# Patient Record
Sex: FEMALE | Race: WHITE | Employment: OTHER | URBAN - METROPOLITAN AREA
[De-identification: names, ages, dates, MRNs, and addresses within clinical notes are randomized per-mention and may not be internally consistent; named-entity substitution may affect disease eponyms.]

---

## 2017-08-10 ENCOUNTER — HOSPITAL ENCOUNTER (OUTPATIENT)
Dept: GENERAL RADIOLOGY | Age: 62
Discharge: HOME | End: 2017-08-10
Attending: INTERNAL MEDICINE | Admitting: INTERNAL MEDICINE

## 2017-08-10 DIAGNOSIS — M81.0 AGE-RELATED OSTEOPOROSIS WITHOUT CURRENT PATHOLOGICAL FRACTURE: ICD-10-CM

## 2017-08-10 DIAGNOSIS — M81.0 OSTEOPOROSIS WITHOUT CURRENT PATHOLOGICAL FRACTURE, UNSPECIFIED OSTEOPOROSIS TYPE: ICD-10-CM

## 2017-08-10 DIAGNOSIS — M81.0 OSTEOPOROSIS, UNSPECIFIED OSTEOPOROSIS TYPE, UNSPECIFIED PATHOLOGICAL FRACTURE PRESENCE: ICD-10-CM

## 2017-09-20 LAB — TSH SERPL DL<=0.05 MIU/L-ACNC: 2.81 UIU/ML (ref 0.27–4.2)

## 2017-10-09 ENCOUNTER — OFFICE VISIT (OUTPATIENT)
Dept: ENT CLINIC | Age: 62
End: 2017-10-09

## 2017-10-09 VITALS
SYSTOLIC BLOOD PRESSURE: 148 MMHG | HEART RATE: 65 BPM | HEIGHT: 63 IN | WEIGHT: 136.8 LBS | BODY MASS INDEX: 24.24 KG/M2 | DIASTOLIC BLOOD PRESSURE: 87 MMHG

## 2017-10-09 DIAGNOSIS — R07.0 THROAT DISCOMFORT: ICD-10-CM

## 2017-10-09 DIAGNOSIS — H93.12 SUBJECTIVE TINNITUS OF LEFT EAR: Primary | ICD-10-CM

## 2017-10-09 PROCEDURE — 3017F COLORECTAL CA SCREEN DOC REV: CPT | Performed by: OTOLARYNGOLOGY

## 2017-10-09 PROCEDURE — 99203 OFFICE O/P NEW LOW 30 MIN: CPT | Performed by: OTOLARYNGOLOGY

## 2017-10-09 PROCEDURE — G8484 FLU IMMUNIZE NO ADMIN: HCPCS | Performed by: OTOLARYNGOLOGY

## 2017-10-09 PROCEDURE — 1036F TOBACCO NON-USER: CPT | Performed by: OTOLARYNGOLOGY

## 2017-10-09 PROCEDURE — G8427 DOCREV CUR MEDS BY ELIG CLIN: HCPCS | Performed by: OTOLARYNGOLOGY

## 2017-10-09 PROCEDURE — G8420 CALC BMI NORM PARAMETERS: HCPCS | Performed by: OTOLARYNGOLOGY

## 2017-10-09 PROCEDURE — 3014F SCREEN MAMMO DOC REV: CPT | Performed by: OTOLARYNGOLOGY

## 2017-10-09 RX ORDER — MULTIVITAMIN
1 TABLET ORAL
COMMUNITY

## 2017-10-09 RX ORDER — AMLODIPINE BESYLATE 5 MG/1
5 TABLET ORAL
Status: ON HOLD | COMMUNITY
End: 2020-11-19 | Stop reason: HOSPADM

## 2017-10-09 RX ORDER — LISINOPRIL AND HYDROCHLOROTHIAZIDE 12.5; 1 MG/1; MG/1
1 TABLET ORAL
Status: ON HOLD | COMMUNITY
End: 2020-11-19 | Stop reason: HOSPADM

## 2017-10-09 NOTE — PROGRESS NOTES
[facility] ENT       NEW PATIENT VISIT    PCP:  Jose Sim MD      CHIEF COMPLAINT:  Chief Complaint   Patient presents with    Other     ringing in the left ear, and a weird sensation like someone is tappiung on the back of my throat. Alexandria Norton HISTORY OF PRESENT ILLNESS:       (Location, Quality, Severity, Duration, Timing, Context, Modifying factors, Associated symptoms)  Kiley Everett is a 60 y.o. female here today for a problem located in the left ear, of ringing tinnitus. This had been going on for about one year. It is of \"mild\" severity. It is constant, sometimes less at night but  uses a nasal CPAP for sleep apnea. Throat for one month off and on, with a weird sensation like someone is tapping on the back of my throat. No h/o neck of throat injury. Sinus infection 1-2 episodes per year fall and spring. REVIEW OF SYSTEMS:    CONSTITUTIONAL:  Denied fever and chills. Denied unexplained weight loss. EARS, NOSE, THROAT:  (+) hearing loss, tested at [address] [place] [address] & [facility] and another ENT about 2006. She has trouble with background noise. No recommendation for HA.  (+) nasal dyspnea, for many years had septoplasty, and sinus surgery. Denied otorrhea, otalgia, hearing loss, epistaxis, rhinorrhea, sore throat and chronic hoarseness. PAST MEDICAL HISTORY:    History reviewed. No pertinent past medical history. History reviewed. No pertinent surgical history. EXAMINATION:    VITALS SIGNS reviewed. Vitals:    10/09/17 1604 10/09/17 1627   BP: (!) 151/89 (!) 148/87   Site: Left Arm    Position: Sitting    Cuff Size: Medium Adult    Pulse: 71 65   Weight: 136 lb 12.8 oz (62.1 kg)    Height: 5' 3\" (1.6 m)      GENERAL. APPEARANCE:  Well developed, well nourished, no apparent distress, no apparent deformities. ABILITY TO COMMUNICATE/QUALITY OF VOICE:  Communicated without difficulty. Normal voice.   INSPECTION, HEAD AND FACE:  Normal overall appearance, with no scars, lesions or masses. SINUSES:  The maxillary and frontal sinuses were nontender, bilaterally. SALIVARY GLANDS:  Parotid, submandibular and sublingual glands were normal.  FACIAL STRENGTH, MOTION:  Normal and equal for all five branches bilaterally. EYES:  Extraocular motion:  intact, normal primary gaze alignment. HEARING ASSESSMENT:  Finger rub:  Able to hear finger rub bilaterally. Tuning fork tests, [address] tuning fork:  Infante midline. Rinne air > bone bilaterally. (+) EARS:  OTOSCOPY: The right TM and EAC appeared to be normal.  There was cerumen accumulation in the left EAC.  (+) OTOMICROSCOPY:  Cerumen accumulation was removed from the left ear,using a wire loop. The left TM and EAC appeared to be normal.  The left TM mobility was normal to pneumatic massage. EXTERNAL EAR/NOSE:  Normal pinnae and mastoids. Normal external nose. (+) NOSE:  The nasal septum was moderately deviated to the right with a right inferior spur. The inferior turbinates were normal.  The nasal mucosa and secretions were normal.  No pus or polyps were seen. Nasal airflow appeared to be normal bilaterally. LIPS, TEETH AND GUMS:  Normal.  (+) OROPHARYNX/ORALCAVITY:  Post tonsillectomy. Oral mucosa, hard and soft palates, tongue, and pharynx were normal.  (+) INDIRECT LARYNGOSCOPY:  Visualization was suboptimal due to a strong gag reflex and guarding. The base of tongue and epiglottis appeared to be normal.  Unable to visualize the vocal cords and hypopharynx, and endolarynx. NECK:  No masses or tenderness. No abnormal appearance, asymmetry or tracheal deviation. Laryngeal cartilages and hyoid bone were normal to palpation. THYROID:  No nodules, enlargement, tenderness or masses. LYMPH NODES, CERVICAL, FACIAL AND SUPRACLAVICULAR:  No lymphadenopathy. Tiffani Auguste / Aaron Magaña / Adrian Quarto:       [facility] was seen today for other.     Diagnoses and all orders for this visit:    Subjective tinnitus of left ear    Throat discomfort  Comments:  tapping sensation         RECOMMENDATIONS/PLAN:    1. Patient advised to call if throat problem persists longer than 6 weeks or worsens. Flexible  fiberoptic nasopharyngolaryngoscopy if this persists. 2. Audiogram.    3. See Patient Instructions.     4. Return for recheck and follow-up after hearing test.

## 2017-10-10 ENCOUNTER — OFFICE VISIT (OUTPATIENT)
Dept: ENT CLINIC | Age: 62
End: 2017-10-10

## 2017-10-10 DIAGNOSIS — H93.12 LEFT-SIDED TINNITUS: Primary | ICD-10-CM

## 2017-10-10 PROCEDURE — 92557 COMPREHENSIVE HEARING TEST: CPT | Performed by: AUDIOLOGIST

## 2017-10-10 PROCEDURE — 92550 TYMPANOMETRY & REFLEX THRESH: CPT | Performed by: AUDIOLOGIST

## 2018-06-06 ENCOUNTER — OFFICE VISIT (OUTPATIENT)
Dept: ENT CLINIC | Age: 63
End: 2018-06-06

## 2018-06-06 VITALS
HEIGHT: 63 IN | SYSTOLIC BLOOD PRESSURE: 132 MMHG | WEIGHT: 135 LBS | BODY MASS INDEX: 23.92 KG/M2 | HEART RATE: 64 BPM | DIASTOLIC BLOOD PRESSURE: 82 MMHG

## 2018-06-06 DIAGNOSIS — H90.3 BILATERAL HIGH FREQUENCY SENSORINEURAL HEARING LOSS: ICD-10-CM

## 2018-06-06 DIAGNOSIS — H93.12 SUBJECTIVE TINNITUS OF LEFT EAR: Primary | ICD-10-CM

## 2018-06-06 PROCEDURE — 1036F TOBACCO NON-USER: CPT | Performed by: OTOLARYNGOLOGY

## 2018-06-06 PROCEDURE — 99214 OFFICE O/P EST MOD 30 MIN: CPT | Performed by: OTOLARYNGOLOGY

## 2018-06-06 PROCEDURE — 3017F COLORECTAL CA SCREEN DOC REV: CPT | Performed by: OTOLARYNGOLOGY

## 2018-06-06 PROCEDURE — G8427 DOCREV CUR MEDS BY ELIG CLIN: HCPCS | Performed by: OTOLARYNGOLOGY

## 2018-06-06 PROCEDURE — G8420 CALC BMI NORM PARAMETERS: HCPCS | Performed by: OTOLARYNGOLOGY

## 2018-06-11 ENCOUNTER — TELEPHONE (OUTPATIENT)
Dept: ENT CLINIC | Age: 63
End: 2018-06-11

## 2018-06-11 DIAGNOSIS — H93.12 TINNITUS OF LEFT EAR: Primary | ICD-10-CM

## 2018-06-15 ENCOUNTER — HOSPITAL ENCOUNTER (OUTPATIENT)
Dept: MRI IMAGING | Age: 63
Discharge: HOME | End: 2018-06-15
Attending: OTOLARYNGOLOGY | Admitting: OTOLARYNGOLOGY

## 2018-06-15 DIAGNOSIS — H93.12 TINNITUS OF LEFT EAR: ICD-10-CM

## 2018-06-15 DIAGNOSIS — H93.12 SUBJECTIVE TINNITUS OF LEFT EAR: ICD-10-CM

## 2018-06-15 LAB
BUN BLDV-MCNC: 14 MG/DL (ref 7–20)
CREAT SERPL-MCNC: 0.7 MG/DL (ref 0.6–1.2)
GFR AFRICAN AMERICAN: >60
GFR NON-AFRICAN AMERICAN: >60

## 2018-06-15 RX ORDER — 0.9 % SODIUM CHLORIDE 0.9 %
10 VIAL (ML) INJECTION
Status: COMPLETED | OUTPATIENT
Start: 2018-06-15 | End: 2018-06-15

## 2018-06-15 RX ADMIN — Medication 10 ML: at 07:28

## 2018-06-16 ENCOUNTER — TELEPHONE (OUTPATIENT)
Dept: ENT CLINIC | Age: 63
End: 2018-06-16

## 2019-08-12 ENCOUNTER — HOSPITAL ENCOUNTER (OUTPATIENT)
Dept: GENERAL RADIOLOGY | Age: 64
Discharge: HOME | End: 2019-08-12
Payer: MEDICARE

## 2019-08-12 DIAGNOSIS — Z78.0 ASYMPTOMATIC AGE-RELATED POSTMENOPAUSAL STATE: ICD-10-CM

## 2019-08-12 PROCEDURE — 77080 DXA BONE DENSITY AXIAL: CPT

## 2020-06-05 ENCOUNTER — OFFICE VISIT (OUTPATIENT)
Dept: PRIMARY CARE CLINIC | Age: 65
End: 2020-06-05

## 2020-06-07 LAB
SARS-COV-2: NOT DETECTED
SOURCE: NORMAL

## 2020-06-09 ENCOUNTER — HOSPITAL ENCOUNTER (OUTPATIENT)
Dept: CT IMAGING | Age: 65
Discharge: HOME | End: 2020-06-09
Payer: MEDICARE

## 2020-06-09 VITALS
OXYGEN SATURATION: 95 % | SYSTOLIC BLOOD PRESSURE: 105 MMHG | HEIGHT: 63 IN | BODY MASS INDEX: 24.98 KG/M2 | WEIGHT: 141 LBS | DIASTOLIC BLOOD PRESSURE: 70 MMHG | TEMPERATURE: 98 F | HEART RATE: 72 BPM | RESPIRATION RATE: 15 BRPM

## 2020-06-09 LAB
ANION GAP SERPL CALCULATED.3IONS-SCNC: 10 MMOL/L (ref 3–16)
APTT: 31.8 SEC (ref 24.2–36.2)
BASOPHILS ABSOLUTE: 0 K/UL (ref 0–0.2)
BASOPHILS RELATIVE PERCENT: 0.8 %
BUN BLDV-MCNC: 12 MG/DL (ref 7–20)
CALCIUM SERPL-MCNC: 9.7 MG/DL (ref 8.3–10.6)
CHLORIDE BLD-SCNC: 99 MMOL/L (ref 99–110)
CO2: 27 MMOL/L (ref 21–32)
CREAT SERPL-MCNC: 0.6 MG/DL (ref 0.6–1.2)
EOSINOPHILS ABSOLUTE: 0.1 K/UL (ref 0–0.6)
EOSINOPHILS RELATIVE PERCENT: 2.6 %
GFR AFRICAN AMERICAN: >60
GFR NON-AFRICAN AMERICAN: >60
GLUCOSE BLD-MCNC: 111 MG/DL (ref 70–99)
HCT VFR BLD CALC: 37.9 % (ref 36–48)
HEMOGLOBIN: 12.7 G/DL (ref 12–16)
INR BLD: 0.91 (ref 0.86–1.14)
LYMPHOCYTES ABSOLUTE: 1.6 K/UL (ref 1–5.1)
LYMPHOCYTES RELATIVE PERCENT: 29.3 %
MCH RBC QN AUTO: 31 PG (ref 26–34)
MCHC RBC AUTO-ENTMCNC: 33.5 G/DL (ref 31–36)
MCV RBC AUTO: 92.5 FL (ref 80–100)
MONOCYTES ABSOLUTE: 0.9 K/UL (ref 0–1.3)
MONOCYTES RELATIVE PERCENT: 16 %
NEUTROPHILS ABSOLUTE: 2.8 K/UL (ref 1.7–7.7)
NEUTROPHILS RELATIVE PERCENT: 51.3 %
PDW BLD-RTO: 13.5 % (ref 12.4–15.4)
PLATELET # BLD: 292 K/UL (ref 135–450)
PMV BLD AUTO: 7.4 FL (ref 5–10.5)
POTASSIUM SERPL-SCNC: 3.8 MMOL/L (ref 3.5–5.1)
PROTHROMBIN TIME: 10.6 SEC (ref 10–13.2)
RBC # BLD: 4.09 M/UL (ref 4–5.2)
SODIUM BLD-SCNC: 136 MMOL/L (ref 136–145)
WBC # BLD: 5.4 K/UL (ref 4–11)

## 2020-06-09 PROCEDURE — 88184 FLOWCYTOMETRY/ TC 1 MARKER: CPT

## 2020-06-09 PROCEDURE — 85025 COMPLETE CBC W/AUTO DIFF WBC: CPT

## 2020-06-09 PROCEDURE — 88374 M/PHMTRC ALYS ISHQUANT/SEMIQ: CPT

## 2020-06-09 PROCEDURE — 88313 SPECIAL STAINS GROUP 2: CPT

## 2020-06-09 PROCEDURE — 88311 DECALCIFY TISSUE: CPT

## 2020-06-09 PROCEDURE — 88342 IMHCHEM/IMCYTCHM 1ST ANTB: CPT

## 2020-06-09 PROCEDURE — 7100000011 HC PHASE II RECOVERY - ADDTL 15 MIN

## 2020-06-09 PROCEDURE — 7100000010 HC PHASE II RECOVERY - FIRST 15 MIN

## 2020-06-09 PROCEDURE — 88305 TISSUE EXAM BY PATHOLOGIST: CPT

## 2020-06-09 PROCEDURE — 88185 FLOWCYTOMETRY/TC ADD-ON: CPT

## 2020-06-09 PROCEDURE — 85610 PROTHROMBIN TIME: CPT

## 2020-06-09 PROCEDURE — 85730 THROMBOPLASTIN TIME PARTIAL: CPT

## 2020-06-09 PROCEDURE — 80048 BASIC METABOLIC PNL TOTAL CA: CPT

## 2020-06-09 PROCEDURE — 20225 BONE BIOPSY TROCAR/NDL DEEP: CPT

## 2020-06-09 PROCEDURE — 6360000002 HC RX W HCPCS: Performed by: RADIOLOGY

## 2020-06-09 PROCEDURE — 36415 COLL VENOUS BLD VENIPUNCTURE: CPT

## 2020-06-09 RX ORDER — ACETAMINOPHEN 325 MG/1
650 TABLET ORAL EVERY 4 HOURS PRN
Status: DISCONTINUED | OUTPATIENT
Start: 2020-06-09 | End: 2020-06-10 | Stop reason: HOSPADM

## 2020-06-09 RX ORDER — FENTANYL CITRATE 50 UG/ML
INJECTION, SOLUTION INTRAMUSCULAR; INTRAVENOUS
Status: COMPLETED | OUTPATIENT
Start: 2020-06-09 | End: 2020-06-09

## 2020-06-09 RX ORDER — MIDAZOLAM HYDROCHLORIDE 1 MG/ML
INJECTION INTRAMUSCULAR; INTRAVENOUS
Status: COMPLETED | OUTPATIENT
Start: 2020-06-09 | End: 2020-06-09

## 2020-06-09 RX ADMIN — MIDAZOLAM 1 MG: 1 INJECTION INTRAMUSCULAR; INTRAVENOUS at 11:29

## 2020-06-09 RX ADMIN — MIDAZOLAM 1 MG: 1 INJECTION INTRAMUSCULAR; INTRAVENOUS at 11:27

## 2020-06-09 RX ADMIN — FENTANYL CITRATE 50 MCG: 50 INJECTION INTRAMUSCULAR; INTRAVENOUS at 11:26

## 2020-06-09 RX ADMIN — FENTANYL CITRATE 50 MCG: 50 INJECTION INTRAMUSCULAR; INTRAVENOUS at 11:29

## 2020-06-09 ASSESSMENT — PAIN - FUNCTIONAL ASSESSMENT: PAIN_FUNCTIONAL_ASSESSMENT: 0-10

## 2020-06-09 NOTE — PRE SEDATION
Sedation Pre-Procedure Note    Patient Name: Myles Benitez   YOB: 1955  Room/Bed: Room/bed info not found  Medical Record Number: [id number]  Date: 6/9/2020   Time: 11:05 AM       Indication: Abnormal plasma protein test    Consent: I have discussed with the patient and/or the patient representative the indication, alternatives, and the possible risks and/or complications of the planned procedure and the anesthesia methods. The patient and/or patient representative appear to understand and agree to proceed. Vital Signs:   Vitals:    06/09/20 1035   BP: (!) 148/85   Pulse: 72   Resp: 18   Temp: 98.4 °F (36.9 °C)   SpO2: 99%       Past Medical History:   has a past medical history of Hyperlipidemia and Hypertension. Past Surgical History:   has a past surgical history that includes Appendectomy; Tonsillectomy; Tubal ligation; and fracture surgery. Medications:   Scheduled Meds:   Continuous Infusions:   PRN Meds:   Home Meds:   Prior to Admission medications    Medication Sig Start Date End Date Taking? Authorizing Provider   amLODIPine (NORVASC) 5 MG tablet Take 5 mg by mouth   Yes Historical Provider, MD   lisinopril-hydrochlorothiazide (PRINZIDE;ZESTORETIC) 10-12.5 MG per tablet Take 1 tablet by mouth   Yes Historical Provider, MD   Multiple Vitamin (MULTIVITAMIN) tablet Take 1 tablet by mouth   Yes Historical Provider, MD   Calcium Carbonate-Vitamin D (CALCIUM-VITAMIN D) 500-200 MG-UNIT per tablet Take 1 tablet by mouth   Yes Historical Provider, MD   Pseudoephedrine-Guaifenesin ([place] [address] D MAX STRENGTH PO) Take by mouth   Yes Historical Provider, MD     Coumadin Use Last 7 Days:  no  Antiplatelet drug therapy use last 7 days: no  Other anticoagulant use last 7 days: no  Additional Medication Information:        Pre-Sedation Documentation and Exam:   I have reviewed the patient's history and review of systems.     Mallampati Airway Assessment:  Mallampati Class II - (soft palate, fauces &

## 2020-06-10 ENCOUNTER — POST-OP TELEPHONE (OUTPATIENT)
Dept: INTERVENTIONAL RADIOLOGY/VASCULAR | Age: 65
End: 2020-06-10

## 2020-06-20 LAB
Lab: NORMAL
REPORT: NORMAL
THIS TEST SENT TO: NORMAL

## 2020-09-02 ENCOUNTER — HOSPITAL ENCOUNTER (OUTPATIENT)
Dept: ULTRASOUND IMAGING | Age: 65
Discharge: HOME | End: 2020-09-02
Payer: MEDICARE

## 2020-09-02 PROCEDURE — 76856 US EXAM PELVIC COMPLETE: CPT

## 2020-09-02 PROCEDURE — 76830 TRANSVAGINAL US NON-OB: CPT

## 2020-09-25 ENCOUNTER — HOSPITAL ENCOUNTER (OUTPATIENT)
Dept: ONCOLOGY | Age: 65
Setting detail: INFUSION SERIES
Discharge: HOME | End: 2020-09-25
Payer: MEDICARE

## 2020-09-25 VITALS
RESPIRATION RATE: 15 BRPM | DIASTOLIC BLOOD PRESSURE: 61 MMHG | TEMPERATURE: 98.3 F | SYSTOLIC BLOOD PRESSURE: 116 MMHG | HEART RATE: 62 BPM | OXYGEN SATURATION: 100 %

## 2020-09-25 LAB
BASOPHILS ABSOLUTE: 0.1 K/UL (ref 0–0.2)
BASOPHILS RELATIVE PERCENT: 2.6 %
EOSINOPHILS ABSOLUTE: 0.1 K/UL (ref 0–0.6)
EOSINOPHILS RELATIVE PERCENT: 1.5 %
HCT VFR BLD CALC: 38.2 % (ref 36–48)
HEMOGLOBIN: 12.6 G/DL (ref 12–16)
LYMPHOCYTES ABSOLUTE: 0.9 K/UL (ref 1–5.1)
LYMPHOCYTES RELATIVE PERCENT: 17.7 %
MCH RBC QN AUTO: 30.4 PG (ref 26–34)
MCHC RBC AUTO-ENTMCNC: 32.9 G/DL (ref 31–36)
MCV RBC AUTO: 92.6 FL (ref 80–100)
MONOCYTES ABSOLUTE: 0.9 K/UL (ref 0–1.3)
MONOCYTES RELATIVE PERCENT: 18.4 %
NEUTROPHILS ABSOLUTE: 2.9 K/UL (ref 1.7–7.7)
NEUTROPHILS RELATIVE PERCENT: 59.8 %
PDW BLD-RTO: 14.5 % (ref 12.4–15.4)
PLATELET # BLD: 247 K/UL (ref 135–450)
PMV BLD AUTO: 7.3 FL (ref 5–10.5)
RBC # BLD: 4.13 M/UL (ref 4–5.2)
WBC # BLD: 4.9 K/UL (ref 4–11)

## 2020-09-25 PROCEDURE — 88185 FLOWCYTOMETRY/TC ADD-ON: CPT

## 2020-09-25 PROCEDURE — 38220 DX BONE MARROW ASPIRATIONS: CPT

## 2020-09-25 PROCEDURE — 88311 DECALCIFY TISSUE: CPT

## 2020-09-25 PROCEDURE — 85025 COMPLETE CBC W/AUTO DIFF WBC: CPT

## 2020-09-25 PROCEDURE — 96376 TX/PRO/DX INJ SAME DRUG ADON: CPT

## 2020-09-25 PROCEDURE — 38221 DX BONE MARROW BIOPSIES: CPT

## 2020-09-25 PROCEDURE — 88237 TISSUE CULTURE BONE MARROW: CPT

## 2020-09-25 PROCEDURE — 88305 TISSUE EXAM BY PATHOLOGIST: CPT

## 2020-09-25 PROCEDURE — 2700000000 HC OXYGEN THERAPY PER DAY

## 2020-09-25 PROCEDURE — 6360000002 HC RX W HCPCS: Performed by: INTERNAL MEDICINE

## 2020-09-25 PROCEDURE — 88341 IMHCHEM/IMCYTCHM EA ADD ANTB: CPT

## 2020-09-25 PROCEDURE — 2580000003 HC RX 258: Performed by: INTERNAL MEDICINE

## 2020-09-25 PROCEDURE — 96375 TX/PRO/DX INJ NEW DRUG ADDON: CPT

## 2020-09-25 PROCEDURE — 94770 HC ETCO2 MONITOR DAILY: CPT

## 2020-09-25 PROCEDURE — 88342 IMHCHEM/IMCYTCHM 1ST ANTB: CPT

## 2020-09-25 PROCEDURE — 88275 CYTOGENETICS 100-300: CPT

## 2020-09-25 PROCEDURE — 96374 THER/PROPH/DIAG INJ IV PUSH: CPT

## 2020-09-25 PROCEDURE — 94761 N-INVAS EAR/PLS OXIMETRY MLT: CPT

## 2020-09-25 PROCEDURE — 88271 CYTOGENETICS DNA PROBE: CPT

## 2020-09-25 PROCEDURE — 88365 INSITU HYBRIDIZATION (FISH): CPT

## 2020-09-25 PROCEDURE — 88184 FLOWCYTOMETRY/ TC 1 MARKER: CPT

## 2020-09-25 PROCEDURE — 88313 SPECIAL STAINS GROUP 2: CPT

## 2020-09-25 PROCEDURE — 88264 CHROMOSOME ANALYSIS 20-25: CPT

## 2020-09-25 RX ORDER — NALOXONE HYDROCHLORIDE 0.4 MG/ML
0.4 INJECTION, SOLUTION INTRAMUSCULAR; INTRAVENOUS; SUBCUTANEOUS PRN
Status: DISCONTINUED | OUTPATIENT
Start: 2020-09-25 | End: 2020-09-26 | Stop reason: HOSPADM

## 2020-09-25 RX ORDER — FENTANYL CITRATE 50 UG/ML
50 INJECTION, SOLUTION INTRAMUSCULAR; INTRAVENOUS ONCE
Status: COMPLETED | OUTPATIENT
Start: 2020-09-25 | End: 2020-09-25

## 2020-09-25 RX ORDER — 0.9 % SODIUM CHLORIDE 0.9 %
500 INTRAVENOUS SOLUTION INTRAVENOUS ONCE
Status: COMPLETED | OUTPATIENT
Start: 2020-09-25 | End: 2020-09-25

## 2020-09-25 RX ORDER — FLUMAZENIL 0.1 MG/ML
0.5 INJECTION, SOLUTION INTRAVENOUS PRN
Status: DISCONTINUED | OUTPATIENT
Start: 2020-09-25 | End: 2020-09-26 | Stop reason: HOSPADM

## 2020-09-25 RX ORDER — MIDAZOLAM HYDROCHLORIDE 1 MG/ML
4 INJECTION INTRAMUSCULAR; INTRAVENOUS ONCE
Status: COMPLETED | OUTPATIENT
Start: 2020-09-25 | End: 2020-09-25

## 2020-09-25 RX ADMIN — FENTANYL CITRATE 50 MCG: 50 INJECTION, SOLUTION INTRAMUSCULAR; INTRAVENOUS at 09:06

## 2020-09-25 RX ADMIN — SODIUM CHLORIDE 500 ML: 9 INJECTION, SOLUTION INTRAVENOUS at 09:29

## 2020-09-25 RX ADMIN — MIDAZOLAM HYDROCHLORIDE 4 MG: 1 INJECTION, SOLUTION INTRAMUSCULAR; INTRAVENOUS at 09:12

## 2020-09-25 NOTE — PROCEDURES
Pt scheduled for bone marrow biopsy with conscious sedation for diagnosis of Multiple Myeloma. Pt's history, allergies, and medication list reviewed by nursing and physician prior to start of procedure. Pt assessed and deemed fit for procedure by physician.

## 2020-09-25 NOTE — PROCEDURES
Procedure:  Nursing present throughout procedure to assess, assist, and monitor. Vital Signs monitored throughout - see sedation documentation. Patient to be discharged from OPO once Juice Score of 8 or better is achieved or once pre-procedural Juice Score is obtained.

## 2020-09-25 NOTE — PROGRESS NOTES
ETCO2  Monitoring done for conscious sedation. Patient is on 2 liters/min via nasal cannula for procedure.     Baseline information:  HR: 68 BP: 119/64  RR: 23 LOC: alert  ETCO2: 37 SpO2: 99    5 minutes after sedation administered:  HR: 72 BP: 104/57  RR: 18 LOC: lethargic  ETCO2: 33 SpO2: 99    10 min after sedation administered:  HR: 67 BP: 102/56  RR: 16 LOC: lethargic  ETCO2: 32 SpO2: 100    Post-Procedure:  HR: 66 BP: 102/56  RR: 17 LOC: alert  ETCO2: 32 SpO2: 100  well    Patient/caregiver was educated on the proper method of use:  Yes      Level of patient/caregiver understanding able to:   [x] Verbalize understanding   [] Demonstrate understanding       [] Teach back        [] Needs reinforcement       []  No available caregiver               []  Other:     Response to education:  Excellent

## 2020-09-25 NOTE — PROGRESS NOTES
Bone Marrow Core & Aspirate with Moderate Sedation    Diagnosis: MultMyeloma   Indication: Restaging  Planned Sedation:  Fentanyl: 50 ug  Versed: 4 mg    Pre-Procedure ASA Score: Class II - mild to moderate systemic disturbance, well-controlled  Airway Assessment Score: II (soft palate, uvula, fauces visible)    Consent:   Consent was obtained from the patient by:   1. Explaining the risks associated with biopsy [bleeding and bruising] and moderate sedation [respiratory depression and sedation], as well as the benefits [an understanding of the current diagnosis to make treatment decisions], and alternatives to biopsy with sedation. 2. The patient voiced an understanding of the risks/benefits and the answers to their questions, then proceeded to sign and date the consent form. Assessment:  Patient was assessed by myself prior to initiation of sedation and procedure and is deemed medically fit to undergo the procedure. Recent H&P, current medications, and allergies are on the chart and reviewed. Time out performed. Procedure:  Patient placed in the prone position. The area was prepped with chloraprep. Sterile drapes were applied. A puncture was made with the provided scalpel, then using a Jamshidi needle a bone marrow aspirate was taken from the left posterior iliac crest.  Then using an 8 G 6\" needle a core biopsy was obtained. A sterile bandage was applied. The patient had no significant bleeding. The sample was sent for histology, flow cytometry and cytogenetics. FISH    Patient tolerated well, no complications. Patient may be discharged from outpt oncology following procedure once Juice Score is at least 8 or meets pre-procedure Juice Score. Nursing to notify physician if pt doesn't meet criteria for discharge.     [address] [facility]  [phone]

## 2020-09-25 NOTE — PROCEDURES
Pre-Procedural Assessment:   Signed, Dated, and timed Informed Consent on the chart  VS's obtained and documented  Pt has a patent IV site (see flowsheets)  Pt has been NPO since 1830 on 9/24/20  Current Medications, Allergies, and recent H&P reviewed and on chart  Emergency medications and equipment available (crash cart, narcan, flumazenil, O2, suction, etc)  Time out performed prior to procedure (see sedation documentation)  Pre-Procedure Juice Score: 10

## 2020-09-29 ENCOUNTER — OFFICE VISIT (OUTPATIENT)
Dept: PRIMARY CARE CLINIC | Age: 65
End: 2020-09-29
Payer: MEDICARE

## 2020-09-29 PROCEDURE — 99211 OFF/OP EST MAY X REQ PHY/QHP: CPT | Performed by: NURSE PRACTITIONER

## 2020-09-29 PROCEDURE — G8428 CUR MEDS NOT DOCUMENT: HCPCS | Performed by: NURSE PRACTITIONER

## 2020-09-29 PROCEDURE — G8420 CALC BMI NORM PARAMETERS: HCPCS | Performed by: NURSE PRACTITIONER

## 2020-09-29 NOTE — PATIENT INSTRUCTIONS

## 2020-09-29 NOTE — PROGRESS NOTES
Nilda Cranker received a viral test for COVID-19. They were educated on isolation and quarantine as appropriate. For any symptoms, they were directed to seek care from their PCP, given contact information to establish with a doctor, directed to an urgent care or the emergency room.

## 2020-09-30 ENCOUNTER — HOSPITAL ENCOUNTER (OUTPATIENT)
Dept: CT IMAGING | Age: 65
Discharge: HOME | End: 2020-09-30
Payer: MEDICARE

## 2020-09-30 PROCEDURE — 70450 CT HEAD/BRAIN W/O DYE: CPT

## 2020-09-30 NOTE — PROGRESS NOTES
Name_______________________________________Printed:____________________  Date and time of surgery________10/5/20 0800________________Arrival Time:_____0600 main___________   1. The instructions given regarding when and if a patient needs to stop oral intake prior to surgery varies. Follow the specific instructions you were given                  _x__Nothing to eat or to drink after Midnight the night before.                             ____Endoscopy patient follow your DRS instructions-generally you will be doing a part of the prep after Midnight                   ____Carbo loading or ERAS instructions will be given to select patients-if you have been given those instructions -please do the following                           The evening before your surgery after dinner before midnight drink 40 ounces of gatorade. If you are diabetic use sugar free. The morning of surgery drink 40 ounces of water. This needs to be finished 3 hours prior to your surgery start time. 2. Take the following pills with a small sip of water on the morning of surgery_______norvasc____________________________________________                  Do not take blood pressure medications ending in pril or sartan the ba prior to surgery or the morning of surgery_   3. Aspirin, Ibuprofen, Advil, Naproxen, Vitamin E and other Anti-inflammatory products and supplements should be stopped for 5 -7days before surgery or as directed by your physician. 4. Check with your Doctor regarding stopping Plavix, Coumadin,Eliquis, Lovenox,Effient,Pradaxa,Xarelto, Fragmin or other blood thinners and follow their instructions. 5. Do not smoke, and do not drink any alcoholic beverages 24 hours prior to surgery. This includes NA Beer. Refrain from the usage of any recreational drugs. 6. You may brush your teeth and gargle the morning of surgery. DO NOT SWALLOW WATER   7.  You MUST make arrangements for a responsible adult to stay on site while you are here and one in the room at any given time. 18.  Please bring picture ID and insurance card. 19.  Visit our web site for additional information:  [email or web address]              20.During flu season no children under the age of 15 are permitted in the hospital for the safety of all patients. 21. If you take a long acting insulin in the evening only  take half of your usual  dose the night  before your procedure              22. If you use a c-pap please bring DOS if staying overnight,             23.For your convenience [facility] has a pharmacy on site to fill your prescriptions. 24. If you use oxygen and have a portable tank please bring it  with you the DOS             25. Bring a complete list of all your medications with name and dose include any supplements. 26. Other__________________________________________   *Please call pre admission testing if you any further questions   [place] [address]. [facility]  [phone]   [address] [place] [address]       All above information reviewed with patient in person or by phone. Patient verbalizes understanding. All questions and concerns addressed. Patient/Rep_________pt___________    There is a one visitor policy at [address] [place] [address] for all surgeries and endoscopies. Whether the visitor can stay or will be asked to wait in the car will depend on the current policy and if social distancing can be maintained. The policy is subject to change at any time. Please make sure the visitor has a cell phone that is on,charged and able to accept calls, as this may be the way that the staff communicates with them. Pain management is NO VISITOR policyThe patients ride is expected to remain in the car with a cell phone for communication. If the ride is leaving the hospital grounds please make sure they are back in time for pickup. Have the patient inform the staff on arrival what their rides plans are while the patient is in the facility. At the MAIN there is one visitor allowed. Please note that the visitor policy is subject to change.                                                                                                                                     PRE OP INSTRUCTIONS

## 2020-10-01 ENCOUNTER — HOSPITAL ENCOUNTER (OUTPATIENT)
Age: 65
Discharge: HOME | End: 2020-10-01
Payer: MEDICARE

## 2020-10-01 ENCOUNTER — HOSPITAL ENCOUNTER (OUTPATIENT)
Dept: GENERAL RADIOLOGY | Age: 65
Discharge: HOME | End: 2020-10-01
Payer: MEDICARE

## 2020-10-01 LAB
EKG ATRIAL RATE: 67 BPM
EKG DIAGNOSIS: NORMAL
EKG P AXIS: 51 DEGREES
EKG P-R INTERVAL: 156 MS
EKG Q-T INTERVAL: 404 MS
EKG QRS DURATION: 86 MS
EKG QTC CALCULATION (BAZETT): 426 MS
EKG R AXIS: 1 DEGREES
EKG T AXIS: 32 DEGREES
EKG VENTRICULAR RATE: 67 BPM
SARS-COV-2, NAA: NOT DETECTED

## 2020-10-01 PROCEDURE — 93005 ELECTROCARDIOGRAM TRACING: CPT | Performed by: OBSTETRICS & GYNECOLOGY

## 2020-10-01 PROCEDURE — 93010 ELECTROCARDIOGRAM REPORT: CPT | Performed by: INTERNAL MEDICINE

## 2020-10-01 PROCEDURE — 71046 X-RAY EXAM CHEST 2 VIEWS: CPT

## 2020-10-04 ENCOUNTER — ANESTHESIA EVENT (OUTPATIENT)
Dept: OPERATING ROOM | Age: 65
End: 2020-10-04
Payer: MEDICARE

## 2020-10-05 ENCOUNTER — HOSPITAL ENCOUNTER (OUTPATIENT)
Age: 65
Setting detail: OUTPATIENT SURGERY
Discharge: HOME | End: 2020-10-05
Attending: OBSTETRICS & GYNECOLOGY | Admitting: OBSTETRICS & GYNECOLOGY
Payer: MEDICARE

## 2020-10-05 ENCOUNTER — ANESTHESIA (OUTPATIENT)
Dept: OPERATING ROOM | Age: 65
End: 2020-10-05
Payer: MEDICARE

## 2020-10-05 VITALS
WEIGHT: 147 LBS | BODY MASS INDEX: 26.05 KG/M2 | RESPIRATION RATE: 15 BRPM | OXYGEN SATURATION: 96 % | DIASTOLIC BLOOD PRESSURE: 80 MMHG | TEMPERATURE: 97.3 F | HEIGHT: 63 IN | HEART RATE: 71 BPM | SYSTOLIC BLOOD PRESSURE: 135 MMHG

## 2020-10-05 VITALS
TEMPERATURE: 96.3 F | SYSTOLIC BLOOD PRESSURE: 127 MMHG | RESPIRATION RATE: 15 BRPM | DIASTOLIC BLOOD PRESSURE: 62 MMHG | OXYGEN SATURATION: 100 %

## 2020-10-05 PROBLEM — N83.201 RIGHT OVARIAN CYST: Status: ACTIVE | Noted: 2020-10-05

## 2020-10-05 LAB
ABO/RH: NORMAL
ANTIBODY SCREEN: NORMAL

## 2020-10-05 PROCEDURE — 7100000010 HC PHASE II RECOVERY - FIRST 15 MIN: Performed by: OBSTETRICS & GYNECOLOGY

## 2020-10-05 PROCEDURE — 3600000019 HC SURGERY ROBOT ADDTL 15MIN: Performed by: OBSTETRICS & GYNECOLOGY

## 2020-10-05 PROCEDURE — 88112 CYTOPATH CELL ENHANCE TECH: CPT

## 2020-10-05 PROCEDURE — 2580000003 HC RX 258: Performed by: OBSTETRICS & GYNECOLOGY

## 2020-10-05 PROCEDURE — 86900 BLOOD TYPING SEROLOGIC ABO: CPT

## 2020-10-05 PROCEDURE — S2900 ROBOTIC SURGICAL SYSTEM: HCPCS | Performed by: OBSTETRICS & GYNECOLOGY

## 2020-10-05 PROCEDURE — 86850 RBC ANTIBODY SCREEN: CPT

## 2020-10-05 PROCEDURE — 88305 TISSUE EXAM BY PATHOLOGIST: CPT

## 2020-10-05 PROCEDURE — 7100000001 HC PACU RECOVERY - ADDTL 15 MIN: Performed by: OBSTETRICS & GYNECOLOGY

## 2020-10-05 PROCEDURE — 6370000000 HC RX 637 (ALT 250 FOR IP)

## 2020-10-05 PROCEDURE — 3700000001 HC ADD 15 MINUTES (ANESTHESIA): Performed by: OBSTETRICS & GYNECOLOGY

## 2020-10-05 PROCEDURE — 7100000011 HC PHASE II RECOVERY - ADDTL 15 MIN: Performed by: OBSTETRICS & GYNECOLOGY

## 2020-10-05 PROCEDURE — 36415 COLL VENOUS BLD VENIPUNCTURE: CPT

## 2020-10-05 PROCEDURE — 2580000003 HC RX 258: Performed by: NURSE ANESTHETIST, CERTIFIED REGISTERED

## 2020-10-05 PROCEDURE — 6360000002 HC RX W HCPCS: Performed by: OBSTETRICS & GYNECOLOGY

## 2020-10-05 PROCEDURE — 86901 BLOOD TYPING SEROLOGIC RH(D): CPT

## 2020-10-05 PROCEDURE — 3700000000 HC ANESTHESIA ATTENDED CARE: Performed by: OBSTETRICS & GYNECOLOGY

## 2020-10-05 PROCEDURE — 6360000002 HC RX W HCPCS: Performed by: NURSE ANESTHETIST, CERTIFIED REGISTERED

## 2020-10-05 PROCEDURE — 3600000009 HC SURGERY ROBOT BASE: Performed by: OBSTETRICS & GYNECOLOGY

## 2020-10-05 PROCEDURE — 6360000002 HC RX W HCPCS: Performed by: ANESTHESIOLOGY

## 2020-10-05 PROCEDURE — 2500000003 HC RX 250 WO HCPCS: Performed by: NURSE ANESTHETIST, CERTIFIED REGISTERED

## 2020-10-05 PROCEDURE — 88331 PATH CONSLTJ SURG 1 BLK 1SPC: CPT

## 2020-10-05 PROCEDURE — 2709999900 HC NON-CHARGEABLE SUPPLY: Performed by: OBSTETRICS & GYNECOLOGY

## 2020-10-05 PROCEDURE — 7100000000 HC PACU RECOVERY - FIRST 15 MIN: Performed by: OBSTETRICS & GYNECOLOGY

## 2020-10-05 PROCEDURE — 2500000003 HC RX 250 WO HCPCS: Performed by: OBSTETRICS & GYNECOLOGY

## 2020-10-05 RX ORDER — SODIUM CHLORIDE, SODIUM LACTATE, POTASSIUM CHLORIDE, CALCIUM CHLORIDE 600; 310; 30; 20 MG/100ML; MG/100ML; MG/100ML; MG/100ML
INJECTION, SOLUTION INTRAVENOUS CONTINUOUS PRN
Status: DISCONTINUED | OUTPATIENT
Start: 2020-10-05 | End: 2020-10-05 | Stop reason: ALTCHOICE

## 2020-10-05 RX ORDER — PROPOFOL 10 MG/ML
INJECTION, EMULSION INTRAVENOUS PRN
Status: DISCONTINUED | OUTPATIENT
Start: 2020-10-05 | End: 2020-10-05 | Stop reason: SDUPTHER

## 2020-10-05 RX ORDER — MEPERIDINE HYDROCHLORIDE 25 MG/ML
12.5 INJECTION INTRAMUSCULAR; INTRAVENOUS; SUBCUTANEOUS EVERY 5 MIN PRN
Status: DISCONTINUED | OUTPATIENT
Start: 2020-10-05 | End: 2020-10-05 | Stop reason: HOSPADM

## 2020-10-05 RX ORDER — SODIUM CHLORIDE 0.9 % (FLUSH) 0.9 %
10 SYRINGE (ML) INJECTION EVERY 12 HOURS SCHEDULED
Status: CANCELLED | OUTPATIENT
Start: 2020-10-05

## 2020-10-05 RX ORDER — LIDOCAINE HYDROCHLORIDE 10 MG/ML
1 INJECTION, SOLUTION EPIDURAL; INFILTRATION; INTRACAUDAL; PERINEURAL
Status: CANCELLED | OUTPATIENT
Start: 2020-10-05 | End: 2020-10-05

## 2020-10-05 RX ORDER — HYDROCODONE BITARTRATE AND ACETAMINOPHEN 5; 325 MG/1; MG/1
1 TABLET ORAL PRN
Status: DISCONTINUED | OUTPATIENT
Start: 2020-10-05 | End: 2020-10-05 | Stop reason: HOSPADM

## 2020-10-05 RX ORDER — HYDROMORPHONE HCL 110MG/55ML
0.5 PATIENT CONTROLLED ANALGESIA SYRINGE INTRAVENOUS EVERY 5 MIN PRN
Status: DISCONTINUED | OUTPATIENT
Start: 2020-10-05 | End: 2020-10-05 | Stop reason: HOSPADM

## 2020-10-05 RX ORDER — FENTANYL CITRATE 50 UG/ML
50 INJECTION, SOLUTION INTRAMUSCULAR; INTRAVENOUS EVERY 5 MIN PRN
Status: DISCONTINUED | OUTPATIENT
Start: 2020-10-05 | End: 2020-10-05 | Stop reason: HOSPADM

## 2020-10-05 RX ORDER — LIDOCAINE HYDROCHLORIDE 20 MG/ML
INJECTION, SOLUTION EPIDURAL; INFILTRATION; INTRACAUDAL; PERINEURAL PRN
Status: DISCONTINUED | OUTPATIENT
Start: 2020-10-05 | End: 2020-10-05 | Stop reason: SDUPTHER

## 2020-10-05 RX ORDER — ACETAMINOPHEN 500 MG
500 TABLET ORAL EVERY 6 HOURS PRN
Qty: 30 TABLET | Refills: 0 | Status: ON HOLD | OUTPATIENT
Start: 2020-10-05 | End: 2020-11-04

## 2020-10-05 RX ORDER — TRAMADOL HYDROCHLORIDE 50 MG/1
25-50 TABLET ORAL EVERY 6 HOURS PRN
Qty: 6 TABLET | Refills: 0
Start: 2020-10-05 | End: 2020-10-08

## 2020-10-05 RX ORDER — FENTANYL CITRATE 50 UG/ML
INJECTION, SOLUTION INTRAMUSCULAR; INTRAVENOUS PRN
Status: DISCONTINUED | OUTPATIENT
Start: 2020-10-05 | End: 2020-10-05 | Stop reason: SDUPTHER

## 2020-10-05 RX ORDER — SUCCINYLCHOLINE/SOD CL,ISO/PF 200MG/10ML
SYRINGE (ML) INTRAVENOUS PRN
Status: DISCONTINUED | OUTPATIENT
Start: 2020-10-05 | End: 2020-10-05 | Stop reason: SDUPTHER

## 2020-10-05 RX ORDER — SODIUM CHLORIDE, SODIUM LACTATE, POTASSIUM CHLORIDE, CALCIUM CHLORIDE 600; 310; 30; 20 MG/100ML; MG/100ML; MG/100ML; MG/100ML
INJECTION, SOLUTION INTRAVENOUS CONTINUOUS PRN
Status: DISCONTINUED | OUTPATIENT
Start: 2020-10-05 | End: 2020-10-05 | Stop reason: SDUPTHER

## 2020-10-05 RX ORDER — MAGNESIUM HYDROXIDE 1200 MG/15ML
LIQUID ORAL
Status: COMPLETED | OUTPATIENT
Start: 2020-10-05 | End: 2020-10-05

## 2020-10-05 RX ORDER — DEXAMETHASONE SODIUM PHOSPHATE 4 MG/ML
INJECTION, SOLUTION INTRA-ARTICULAR; INTRALESIONAL; INTRAMUSCULAR; INTRAVENOUS; SOFT TISSUE PRN
Status: DISCONTINUED | OUTPATIENT
Start: 2020-10-05 | End: 2020-10-05 | Stop reason: SDUPTHER

## 2020-10-05 RX ORDER — IBUPROFEN 600 MG/1
600 TABLET ORAL EVERY 6 HOURS
Qty: 30 TABLET | Refills: 0 | Status: ON HOLD | OUTPATIENT
Start: 2020-10-05 | End: 2020-11-04

## 2020-10-05 RX ORDER — BUPIVACAINE HYDROCHLORIDE AND EPINEPHRINE 5; 5 MG/ML; UG/ML
INJECTION, SOLUTION EPIDURAL; INTRACAUDAL; PERINEURAL
Status: COMPLETED | OUTPATIENT
Start: 2020-10-05 | End: 2020-10-05

## 2020-10-05 RX ORDER — ONDANSETRON 2 MG/ML
INJECTION INTRAMUSCULAR; INTRAVENOUS PRN
Status: DISCONTINUED | OUTPATIENT
Start: 2020-10-05 | End: 2020-10-05 | Stop reason: SDUPTHER

## 2020-10-05 RX ORDER — HYDROMORPHONE HCL 110MG/55ML
0.25 PATIENT CONTROLLED ANALGESIA SYRINGE INTRAVENOUS EVERY 5 MIN PRN
Status: DISCONTINUED | OUTPATIENT
Start: 2020-10-05 | End: 2020-10-05 | Stop reason: HOSPADM

## 2020-10-05 RX ORDER — SODIUM CHLORIDE, SODIUM LACTATE, POTASSIUM CHLORIDE, CALCIUM CHLORIDE 600; 310; 30; 20 MG/100ML; MG/100ML; MG/100ML; MG/100ML
INJECTION, SOLUTION INTRAVENOUS CONTINUOUS
Status: DISCONTINUED | OUTPATIENT
Start: 2020-10-05 | End: 2020-10-05 | Stop reason: HOSPADM

## 2020-10-05 RX ORDER — PHENYLEPHRINE HCL IN 0.9% NACL 1 MG/10 ML
SYRINGE (ML) INTRAVENOUS PRN
Status: DISCONTINUED | OUTPATIENT
Start: 2020-10-05 | End: 2020-10-05 | Stop reason: SDUPTHER

## 2020-10-05 RX ORDER — SODIUM CHLORIDE 0.9 % (FLUSH) 0.9 %
10 SYRINGE (ML) INJECTION PRN
Status: CANCELLED | OUTPATIENT
Start: 2020-10-05

## 2020-10-05 RX ORDER — HYDROCODONE BITARTRATE AND ACETAMINOPHEN 5; 325 MG/1; MG/1
2 TABLET ORAL PRN
Status: DISCONTINUED | OUTPATIENT
Start: 2020-10-05 | End: 2020-10-05 | Stop reason: HOSPADM

## 2020-10-05 RX ORDER — LIDOCAINE HYDROCHLORIDE 10 MG/ML
0.5 INJECTION, SOLUTION EPIDURAL; INFILTRATION; INTRACAUDAL; PERINEURAL ONCE
Status: DISCONTINUED | OUTPATIENT
Start: 2020-10-05 | End: 2020-10-05 | Stop reason: HOSPADM

## 2020-10-05 RX ORDER — MAGNESIUM HYDROXIDE 1200 MG/15ML
LIQUID ORAL CONTINUOUS PRN
Status: COMPLETED | OUTPATIENT
Start: 2020-10-05 | End: 2020-10-05

## 2020-10-05 RX ORDER — FENTANYL CITRATE 50 UG/ML
25 INJECTION, SOLUTION INTRAMUSCULAR; INTRAVENOUS EVERY 5 MIN PRN
Status: DISCONTINUED | OUTPATIENT
Start: 2020-10-05 | End: 2020-10-05 | Stop reason: HOSPADM

## 2020-10-05 RX ORDER — ROCURONIUM BROMIDE 10 MG/ML
INJECTION, SOLUTION INTRAVENOUS PRN
Status: DISCONTINUED | OUTPATIENT
Start: 2020-10-05 | End: 2020-10-05 | Stop reason: SDUPTHER

## 2020-10-05 RX ORDER — GLYCOPYRROLATE 1 MG/5 ML
SYRINGE (ML) INTRAVENOUS PRN
Status: DISCONTINUED | OUTPATIENT
Start: 2020-10-05 | End: 2020-10-05 | Stop reason: SDUPTHER

## 2020-10-05 RX ORDER — PROMETHAZINE HYDROCHLORIDE 25 MG/ML
6.25 INJECTION, SOLUTION INTRAMUSCULAR; INTRAVENOUS EVERY 30 MIN PRN
Status: DISCONTINUED | OUTPATIENT
Start: 2020-10-05 | End: 2020-10-05 | Stop reason: HOSPADM

## 2020-10-05 RX ORDER — DIPHENHYDRAMINE HYDROCHLORIDE 50 MG/ML
12.5 INJECTION INTRAMUSCULAR; INTRAVENOUS
Status: DISCONTINUED | OUTPATIENT
Start: 2020-10-05 | End: 2020-10-05 | Stop reason: HOSPADM

## 2020-10-05 RX ORDER — SODIUM CHLORIDE 9 MG/ML
INJECTION, SOLUTION INTRAVENOUS CONTINUOUS
Status: CANCELLED | OUTPATIENT
Start: 2020-10-05

## 2020-10-05 RX ADMIN — Medication 100 MCG: at 08:15

## 2020-10-05 RX ADMIN — PROPOFOL 120 MG: 10 INJECTION, EMULSION INTRAVENOUS at 07:51

## 2020-10-05 RX ADMIN — ROCURONIUM BROMIDE 30 MG: 10 INJECTION, SOLUTION INTRAVENOUS at 08:15

## 2020-10-05 RX ADMIN — SUGAMMADEX 200 MG: 100 INJECTION, SOLUTION INTRAVENOUS at 09:03

## 2020-10-05 RX ADMIN — Medication 0.2 MG: at 08:00

## 2020-10-05 RX ADMIN — SODIUM CHLORIDE, POTASSIUM CHLORIDE, SODIUM LACTATE AND CALCIUM CHLORIDE: 600; 310; 30; 20 INJECTION, SOLUTION INTRAVENOUS at 06:40

## 2020-10-05 RX ADMIN — ROCURONIUM BROMIDE 10 MG: 10 INJECTION, SOLUTION INTRAVENOUS at 07:51

## 2020-10-05 RX ADMIN — Medication 40 MG: at 07:54

## 2020-10-05 RX ADMIN — HYDROMORPHONE HYDROCHLORIDE 0.5 MG: 2 INJECTION, SOLUTION INTRAMUSCULAR; INTRAVENOUS; SUBCUTANEOUS at 09:35

## 2020-10-05 RX ADMIN — LIDOCAINE HYDROCHLORIDE 100 MG: 20 INJECTION, SOLUTION EPIDURAL; INFILTRATION; INTRACAUDAL; PERINEURAL at 07:51

## 2020-10-05 RX ADMIN — ROCURONIUM BROMIDE 10 MG: 10 INJECTION, SOLUTION INTRAVENOUS at 08:36

## 2020-10-05 RX ADMIN — PROPOFOL 50 MG: 10 INJECTION, EMULSION INTRAVENOUS at 07:55

## 2020-10-05 RX ADMIN — Medication 120 MG: at 07:51

## 2020-10-05 RX ADMIN — FENTANYL CITRATE 50 MCG: 50 INJECTION, SOLUTION INTRAMUSCULAR; INTRAVENOUS at 07:51

## 2020-10-05 RX ADMIN — SODIUM CHLORIDE, POTASSIUM CHLORIDE, SODIUM LACTATE AND CALCIUM CHLORIDE: 600; 310; 30; 20 INJECTION, SOLUTION INTRAVENOUS at 07:46

## 2020-10-05 RX ADMIN — FENTANYL CITRATE 50 MCG: 50 INJECTION, SOLUTION INTRAMUSCULAR; INTRAVENOUS at 09:00

## 2020-10-05 RX ADMIN — CEFAZOLIN 2 G: 10 INJECTION, POWDER, FOR SOLUTION INTRAVENOUS at 07:45

## 2020-10-05 RX ADMIN — PROPOFOL 50 MG: 10 INJECTION, EMULSION INTRAVENOUS at 07:58

## 2020-10-05 RX ADMIN — ONDANSETRON 4 MG: 2 INJECTION INTRAMUSCULAR; INTRAVENOUS at 08:10

## 2020-10-05 RX ADMIN — Medication 40 MG: at 07:57

## 2020-10-05 RX ADMIN — DEXAMETHASONE SODIUM PHOSPHATE 10 MG: 4 INJECTION, SOLUTION INTRAMUSCULAR; INTRAVENOUS at 08:10

## 2020-10-05 ASSESSMENT — PULMONARY FUNCTION TESTS
PIF_VALUE: 24
PIF_VALUE: 17
PIF_VALUE: 30
PIF_VALUE: 24
PIF_VALUE: 26
PIF_VALUE: 29
PIF_VALUE: 18
PIF_VALUE: 16
PIF_VALUE: 29
PIF_VALUE: 1
PIF_VALUE: 19
PIF_VALUE: 29
PIF_VALUE: 23
PIF_VALUE: 2
PIF_VALUE: 19
PIF_VALUE: 5
PIF_VALUE: 30
PIF_VALUE: 25
PIF_VALUE: 29
PIF_VALUE: 30
PIF_VALUE: 1
PIF_VALUE: 30
PIF_VALUE: 26
PIF_VALUE: 16
PIF_VALUE: 29
PIF_VALUE: 16
PIF_VALUE: 27
PIF_VALUE: 20
PIF_VALUE: 2
PIF_VALUE: 20
PIF_VALUE: 30
PIF_VALUE: 30
PIF_VALUE: 16
PIF_VALUE: 0
PIF_VALUE: 1
PIF_VALUE: 30
PIF_VALUE: 19
PIF_VALUE: 19
PIF_VALUE: 7
PIF_VALUE: 28
PIF_VALUE: 31
PIF_VALUE: 29
PIF_VALUE: 21
PIF_VALUE: 16
PIF_VALUE: 15
PIF_VALUE: 27
PIF_VALUE: 19
PIF_VALUE: 26
PIF_VALUE: 27
PIF_VALUE: 30
PIF_VALUE: 27
PIF_VALUE: 30
PIF_VALUE: 23
PIF_VALUE: 28
PIF_VALUE: 30
PIF_VALUE: 30
PIF_VALUE: 4
PIF_VALUE: 14
PIF_VALUE: 27
PIF_VALUE: 25
PIF_VALUE: 1
PIF_VALUE: 29
PIF_VALUE: 30
PIF_VALUE: 29
PIF_VALUE: 29
PIF_VALUE: 17
PIF_VALUE: 30
PIF_VALUE: 1
PIF_VALUE: 23
PIF_VALUE: 30
PIF_VALUE: 30
PIF_VALUE: 16
PIF_VALUE: 22
PIF_VALUE: 30
PIF_VALUE: 28
PIF_VALUE: 25
PIF_VALUE: 30
PIF_VALUE: 21
PIF_VALUE: 7
PIF_VALUE: 17
PIF_VALUE: 31
PIF_VALUE: 23
PIF_VALUE: 30
PIF_VALUE: 24
PIF_VALUE: 28
PIF_VALUE: 30

## 2020-10-05 ASSESSMENT — PAIN DESCRIPTION - PAIN TYPE: TYPE: SURGICAL PAIN

## 2020-10-05 ASSESSMENT — PAIN SCALES - GENERAL
PAINLEVEL_OUTOF10: 7
PAINLEVEL_OUTOF10: 0

## 2020-10-05 ASSESSMENT — PAIN - FUNCTIONAL ASSESSMENT: PAIN_FUNCTIONAL_ASSESSMENT: 0-10

## 2020-10-05 ASSESSMENT — PAIN DESCRIPTION - LOCATION: LOCATION: ABDOMEN

## 2020-10-05 NOTE — PROGRESS NOTES
CLINICAL PHARMACY NOTE: MEDS TO [address] Select Patient?: No  Total # of Prescriptions Filled: 3   The following medications were delivered to the patient:  · Tramadol 50mg  · Tylenol ES 500mg  · Ibuprofen 600mg  Total # of Interventions Completed: 0  Time Spent (min): 30    Additional Documentation:    Delivered to Patient brother    Chente Serna Kermit

## 2020-10-05 NOTE — PROGRESS NOTES
Pt arrived from OR to PACU, awakens to voice, states pain is tolerable at this time. VSS, O2 sats 100% on 7 L simple mask. Incisions on abdomen dry and intact, abdomen soft. Will monitor.

## 2020-10-05 NOTE — ANESTHESIA POSTPROCEDURE EVALUATION
Department of Anesthesiology  Postprocedure Note    Patient: Ashish Morris  MRN: [id number]  YOB: 1955  Date of evaluation: 10/5/2020  Time:  9:53 AM     Procedure Summary     Date:  10/05/20 Room / Location:  [address]    Anesthesia Start:  2933 Anesthesia Stop:  9067    Procedure:  ROBOTIC LAPAROSCOPIC BILATERAL SALPINGO OOPHORECTOMY (Bilateral Abdomen) Diagnosis:       RLQ abdominal mass      (R19.03 ADNEXAL MASS)    Surgeon:  Mariaa Quezada MD Responsible Provider:  Meredith Kaur MD    Anesthesia Type:  general ASA Status:  2          Anesthesia Type: general    Juice Phase I: Juice Score: 8    Juice Phase II:      Last vitals: Reviewed and per EMR flowsheets.        Anesthesia Post Evaluation    Patient location during evaluation: PACU  Patient participation: complete - patient participated  Level of consciousness: awake and alert  Pain score: 2  Airway patency: patent  Nausea & Vomiting: no vomiting  Complications: no  Cardiovascular status: blood pressure returned to baseline  Respiratory status: acceptable  Hydration status: euvolemic

## 2020-10-05 NOTE — ANESTHESIA PRE PROCEDURE
Department of Anesthesiology  Preprocedure Note       Name:  Dannis Alpers   Age:  76 y.o.  :  1955                                          MRN:  [id number]         Date:  10/5/2020      Surgeon: Eve Gonzalez):  Manisha Giles MD    Procedure: Procedure(s):  ROBOTIC LAPAROSCOPIC BILATERAL SALPINGO OOPHORECTOMY; POSSIBLE HYSTERECTOMY;OMENTECTOMY; LYMPH NODE DISSECTION; APPENDECTOMY; ANY OTHER INDICATED PROCEDURES    Medications prior to admission:   Prior to Admission medications    Medication Sig Start Date End Date Taking? Authorizing Provider   amLODIPine (NORVASC) 5 MG tablet Take 5 mg by mouth   Yes Historical Provider, MD   lisinopril-hydrochlorothiazide (PRINZIDE;ZESTORETIC) 10-12.5 MG per tablet Take 1 tablet by mouth   Yes Historical Provider, MD   Multiple Vitamin (MULTIVITAMIN) tablet Take 1 tablet by mouth   Yes Historical Provider, MD   Calcium Carbonate-Vitamin D (CALCIUM-VITAMIN D) 500-200 MG-UNIT per tablet Take 1 tablet by mouth   Yes Historical Provider, MD   Pseudoephedrine-Guaifenesin (MUCIN[place] D MAX STRENGTH PO) Take by mouth    Historical Provider, MD       Current medications:    Current Facility-Administered Medications   Medication Dose Route Frequency Provider Last Rate Last Dose    ceFAZolin (ANCEF) 2 g in dextrose 5 % 100 mL IVPB  2 g Intravenous On Call to [address] [place] [address], MD        lidocaine PF 1 % injection 0.5 mL  0.5 mL Subcutaneous Once Manisha Giles MD        lactated ringers infusion   Intravenous Continuous Manisha Giles MD 50 mL/hr at 10/05/20 0640         Allergies:     Allergies   Allergen Reactions    Trazodone Itching       Problem List:    Patient Active Problem List   Diagnosis Code    Subjective tinnitus of left ear H93.12    Throat discomfort R07.0       Past Medical History:        Diagnosis Date    Cancer ([facility] [address]     Hyperlipidemia     Hypertension     PONV (postoperative nausea and vomiting)        Past Surgical History:        Procedure Laterality Date    hours.    Coags:   Lab Results   Component Value Date    PROTIME 10.6 06/09/2020    INR 0.91 06/09/2020    APTT 31.8 06/09/2020       HCG (If Applicable): No results found for: PREGTESTUR, PREGSERUM, HCG, HCGQUANT     ABGs: No results found for: PHART, PO2ART, WHZ1PKT, HGT2KQG, BEART, Y7GRMLUF     Type & Screen (If Applicable):  No results found for: LABABO, LABRH    Drug/Infectious Status (If Applicable):  No results found for: HIV, HEPCAB    COVID-19 Screening (If Applicable):   Lab Results   Component Value Date    COVID19 NOT DETECTED 09/29/2020         Anesthesia Evaluation  Patient summary reviewed and Nursing notes reviewed   history of anesthetic complications: PONV. Airway: Mallampati: II  TM distance: >3 FB   Neck ROM: full  Mouth opening: > = 3 FB Dental:          Pulmonary:                              Cardiovascular:  Exercise tolerance: good (>4 METS),   (+) hypertension: moderate,                   Neuro/Psych:               GI/Hepatic/Renal:             Endo/Other:                      ROS comment: Multiple myeloma. No bone mets Abdominal:           Vascular:                                        Anesthesia Plan      general     ASA 2       Induction: intravenous and rapid sequence. MIPS: Postoperative opioids intended, Prophylactic antiemetics administered and Postoperative trial extubation. Anesthetic plan and risks discussed with patient. Plan discussed with CRNA.                   Cori Murphy MD   10/5/2020

## 2020-10-05 NOTE — H&P
Date of Surgery Update:  Lazaro Bueno was seen, history and physical examination reviewed, and patient examined by me today. There have been no significant clinical changes since the completion of the previous history and physical.    The risk, benefits, and alternatives of the proposed procedure have been explained to the patient (or appropriate guardian) and understanding verbalized. All questions answered. Patient wishes to proceed.     Electronically signed by: Dora García MD,10/5/2020,7:42 AM

## 2020-10-05 NOTE — OP NOTE
Operative Note      Patient: Gustavo Kilgore  YOB: 1955  MRN: [id number]    Date of Procedure: 10/5/2020    Pre-Op Diagnosis: R19.03 ADNEXAL MASS    Post-Op Diagnosis: Same       Procedure(s):  ROBOTIC LAPAROSCOPIC BILATERAL SALPINGO OOPHORECTOMY    Surgeon(s):  Carla Colon MD    Assistant:   Surgical Assistant: Michelle Watkins RN    Anesthesia: General    Estimated Blood Loss (mL): less than 50     Complications: None    Specimens:   ID Type Source Tests Collected by Time Destination   1 : 1) PELVIC WASHING FOR CYTOLOGY Body Fluid Fluid CYTOLOGY, NON-GYN Carla Colon MD 10/5/2020 7800    A : A) RIGHT FALLOPIAN TUBE AND RIGHT OVARY- FROZEN Tissue Tissue SURGICAL PATHOLOGY Carla Colon MD 10/5/2020 0848    B : B) LEFT FALLOPIAN TUBE AND LEFT OVARY Tissue Tissue SURGICAL PATHOLOGY Carla Colon MD 10/5/2020 5575        Implants:  * No implants in log *      Drains:   [REMOVED] Urethral Catheter Non-latex;Straight-tip 16 fr (Removed)       Findings: 8 cm right cystic ovary, benign on frozen section. Normal appearing left adnexa, uterine fibroid, normal upper abdomen    Detailed Description of Procedure: The patient was taken to the operating room. She was placed in a dorsal supine position with sequential compression devices on prior to the administration of anesthesia. Preoperative antibiotics were infused, and general endotracheal anesthesia was induced without difficulty. Legs were then elevated into King stirrups. Arms were tucked, and we tested the patient. She was able to tolerate steep Trendelenburg. We prepped and draped the patient in the usual sterile fashion and placed a catheter. Attention was turned to the abdomen. A supraumbilical skin incision and a Veress needle was inserted. The abdomen was insufflated to a patient pressure of 15 mmHg. A trocar was inserted followed by insertion of the robotic scope noting good placement and a normal upper abdomen.  Two

## 2020-10-05 NOTE — PROGRESS NOTES
Discharge instructions reviewed and understanding verbalized per pt/family with copy given. All home medications/new prescriptions have been reviewed, questions answered and patient/family state understanding. Medication information sheet provided for new prescriptions received when applicable      Lap sites clean dry and intact. Pt states she has minimal pain. pts brother in law, Danay Moore, called to come  pt. Outpt pharmacy to deliver prescription meds to car when brother in law arrives. Bedside handoff report given to Rohini Epps RN.

## 2020-10-12 ENCOUNTER — OFFICE VISIT (OUTPATIENT)
Dept: PRIMARY CARE CLINIC | Age: 65
End: 2020-10-12
Payer: MEDICARE

## 2020-10-12 PROCEDURE — G8417 CALC BMI ABV UP PARAM F/U: HCPCS | Performed by: NURSE PRACTITIONER

## 2020-10-12 PROCEDURE — G8428 CUR MEDS NOT DOCUMENT: HCPCS | Performed by: NURSE PRACTITIONER

## 2020-10-12 PROCEDURE — 99211 OFF/OP EST MAY X REQ PHY/QHP: CPT | Performed by: NURSE PRACTITIONER

## 2020-10-12 NOTE — PATIENT INSTRUCTIONS

## 2020-10-12 NOTE — PROGRESS NOTES
Franklin Jurist received a viral test for COVID-19. They were educated on isolation and quarantine as appropriate. For any symptoms, they were directed to seek care from their PCP, given contact information to establish with a doctor, directed to an urgent care or the emergency room.

## 2020-10-13 LAB — SARS-COV-2, NAA: NOT DETECTED

## 2020-10-13 NOTE — RESULT ENCOUNTER NOTE

## 2020-10-16 ENCOUNTER — HOSPITAL ENCOUNTER (OUTPATIENT)
Dept: ONCOLOGY | Age: 65
Setting detail: INFUSION SERIES
Discharge: HOME | End: 2020-10-16
Payer: MEDICARE

## 2020-10-16 ENCOUNTER — HOSPITAL ENCOUNTER (OUTPATIENT)
Dept: GENERAL RADIOLOGY | Age: 65
Discharge: HOME | End: 2020-10-16
Payer: MEDICARE

## 2020-10-16 ENCOUNTER — HOSPITAL ENCOUNTER (OUTPATIENT)
Dept: PULMONOLOGY | Age: 65
Discharge: HOME | End: 2020-10-16
Payer: MEDICARE

## 2020-10-16 ENCOUNTER — HOSPITAL ENCOUNTER (OUTPATIENT)
Dept: NON INVASIVE DIAGNOSTICS | Age: 65
Discharge: HOME | End: 2020-10-16
Payer: MEDICARE

## 2020-10-16 VITALS
HEIGHT: 63 IN | BODY MASS INDEX: 26.48 KG/M2 | DIASTOLIC BLOOD PRESSURE: 79 MMHG | WEIGHT: 149.47 LBS | RESPIRATION RATE: 14 BRPM | SYSTOLIC BLOOD PRESSURE: 144 MMHG | TEMPERATURE: 97.2 F | HEART RATE: 75 BPM

## 2020-10-16 LAB
A/G RATIO: 2 (ref 1.1–2.2)
ABO/RH: NORMAL
ALBUMIN SERPL-MCNC: 5.1 G/DL (ref 3.4–5)
ALP BLD-CCNC: 78 U/L (ref 40–129)
ALT SERPL-CCNC: 11 U/L (ref 10–40)
ANION GAP SERPL CALCULATED.3IONS-SCNC: 13 MMOL/L (ref 3–16)
ANTIBODY SCREEN: NORMAL
APTT: 33.1 SEC (ref 24.2–36.2)
AST SERPL-CCNC: 19 U/L (ref 15–37)
BASOPHILS ABSOLUTE: 0.1 K/UL (ref 0–0.2)
BASOPHILS RELATIVE PERCENT: 1 %
BILIRUB SERPL-MCNC: 0.6 MG/DL (ref 0–1)
BILIRUBIN DIRECT: <0.2 MG/DL (ref 0–0.3)
BILIRUBIN URINE: NEGATIVE
BILIRUBIN, INDIRECT: NORMAL MG/DL (ref 0–1)
BLOOD, URINE: NEGATIVE
BUN BLDV-MCNC: 9 MG/DL (ref 7–20)
CALCIUM SERPL-MCNC: 9.4 MG/DL (ref 8.3–10.6)
CHLORIDE BLD-SCNC: 97 MMOL/L (ref 99–110)
CLARITY: CLEAR
CO2: 25 MMOL/L (ref 21–32)
COLOR: YELLOW
CREAT SERPL-MCNC: <0.5 MG/DL (ref 0.6–1.2)
EKG ATRIAL RATE: 63 BPM
EKG DIAGNOSIS: NORMAL
EKG P AXIS: 70 DEGREES
EKG P-R INTERVAL: 154 MS
EKG Q-T INTERVAL: 454 MS
EKG QRS DURATION: 88 MS
EKG QTC CALCULATION (BAZETT): 464 MS
EKG R AXIS: 28 DEGREES
EKG T AXIS: 51 DEGREES
EKG VENTRICULAR RATE: 63 BPM
EOSINOPHILS ABSOLUTE: 0.3 K/UL (ref 0–0.6)
EOSINOPHILS RELATIVE PERCENT: 5.1 %
GFR AFRICAN AMERICAN: >60
GFR NON-AFRICAN AMERICAN: >60
GLOBULIN: 2.5 G/DL
GLUCOSE BLD-MCNC: 104 MG/DL (ref 70–99)
GLUCOSE URINE: NEGATIVE MG/DL
HAV IGM SER IA-ACNC: NORMAL
HBV SURFACE AB TITR SER: <3.5 MIU/ML
HCT VFR BLD CALC: 41 % (ref 36–48)
HEMOGLOBIN: 13.8 G/DL (ref 12–16)
INR BLD: 0.86 (ref 0.86–1.14)
KETONES, URINE: NEGATIVE MG/DL
LACTATE DEHYDROGENASE: 205 U/L (ref 100–190)
LEUKOCYTE ESTERASE, URINE: NEGATIVE
LV EF: 63 %
LVEF MODALITY: NORMAL
LYMPHOCYTES ABSOLUTE: 1.5 K/UL (ref 1–5.1)
LYMPHOCYTES RELATIVE PERCENT: 22.7 %
MCH RBC QN AUTO: 30.6 PG (ref 26–34)
MCHC RBC AUTO-ENTMCNC: 33.6 G/DL (ref 31–36)
MCV RBC AUTO: 91.3 FL (ref 80–100)
MICROSCOPIC EXAMINATION: NORMAL
MONOCYTES ABSOLUTE: 0.9 K/UL (ref 0–1.3)
MONOCYTES RELATIVE PERCENT: 14.3 %
NEUTROPHILS ABSOLUTE: 3.7 K/UL (ref 1.7–7.7)
NEUTROPHILS RELATIVE PERCENT: 56.9 %
NITRITE, URINE: NEGATIVE
PDW BLD-RTO: 13.9 % (ref 12.4–15.4)
PH UA: 6.5 (ref 5–8)
PHOSPHORUS: 3.3 MG/DL (ref 2.5–4.9)
PLATELET # BLD: 316 K/UL (ref 135–450)
PMV BLD AUTO: 7.4 FL (ref 5–10.5)
POTASSIUM SERPL-SCNC: 3.7 MMOL/L (ref 3.5–5.1)
PROTEIN UA: NEGATIVE MG/DL
PROTHROMBIN TIME: 10 SEC (ref 10–13.2)
RBC # BLD: 4.5 M/UL (ref 4–5.2)
SODIUM BLD-SCNC: 135 MMOL/L (ref 136–145)
SPECIFIC GRAVITY UA: 1.02 (ref 1–1.03)
TOTAL PROTEIN: 7.6 G/DL (ref 6.4–8.2)
URIC ACID, SERUM: 3.9 MG/DL (ref 2.6–6)
URINE TYPE: NORMAL
UROBILINOGEN, URINE: 0.2 E.U./DL
WBC # BLD: 6.5 K/UL (ref 4–11)

## 2020-10-16 PROCEDURE — 77075 RADEX OSSEOUS SURVEY COMPL: CPT

## 2020-10-16 PROCEDURE — 94010 BREATHING CAPACITY TEST: CPT

## 2020-10-16 PROCEDURE — 93010 ELECTROCARDIOGRAM REPORT: CPT | Performed by: INTERNAL MEDICINE

## 2020-10-16 PROCEDURE — 80053 COMPREHEN METABOLIC PANEL: CPT

## 2020-10-16 PROCEDURE — 86787 VARICELLA-ZOSTER ANTIBODY: CPT

## 2020-10-16 PROCEDURE — 86707 HEPATITIS BE ANTIBODY: CPT

## 2020-10-16 PROCEDURE — 94664 DEMO&/EVAL PT USE INHALER: CPT

## 2020-10-16 PROCEDURE — 86645 CMV ANTIBODY IGM: CPT

## 2020-10-16 PROCEDURE — 85730 THROMBOPLASTIN TIME PARTIAL: CPT

## 2020-10-16 PROCEDURE — 82248 BILIRUBIN DIRECT: CPT

## 2020-10-16 PROCEDURE — 84550 ASSAY OF BLOOD/URIC ACID: CPT

## 2020-10-16 PROCEDURE — 85610 PROTHROMBIN TIME: CPT

## 2020-10-16 PROCEDURE — 86900 BLOOD TYPING SEROLOGIC ABO: CPT

## 2020-10-16 PROCEDURE — 86709 HEPATITIS A IGM ANTIBODY: CPT

## 2020-10-16 PROCEDURE — 94760 N-INVAS EAR/PLS OXIMETRY 1: CPT

## 2020-10-16 PROCEDURE — 94726 PLETHYSMOGRAPHY LUNG VOLUMES: CPT

## 2020-10-16 PROCEDURE — 81003 URINALYSIS AUTO W/O SCOPE: CPT

## 2020-10-16 PROCEDURE — 84100 ASSAY OF PHOSPHORUS: CPT

## 2020-10-16 PROCEDURE — 93356 MYOCRD STRAIN IMG SPCKL TRCK: CPT

## 2020-10-16 PROCEDURE — 83615 LACTATE (LD) (LDH) ENZYME: CPT

## 2020-10-16 PROCEDURE — 86706 HEP B SURFACE ANTIBODY: CPT

## 2020-10-16 PROCEDURE — 86850 RBC ANTIBODY SCREEN: CPT

## 2020-10-16 PROCEDURE — 93306 TTE W/DOPPLER COMPLETE: CPT

## 2020-10-16 PROCEDURE — 93005 ELECTROCARDIOGRAM TRACING: CPT | Performed by: INTERNAL MEDICINE

## 2020-10-16 PROCEDURE — 86694 HERPES SIMPLEX NES ANTBDY: CPT

## 2020-10-16 PROCEDURE — 85025 COMPLETE CBC W/AUTO DIFF WBC: CPT

## 2020-10-16 PROCEDURE — 86901 BLOOD TYPING SEROLOGIC RH(D): CPT

## 2020-10-16 PROCEDURE — 94729 DIFFUSING CAPACITY: CPT

## 2020-10-16 NOTE — CARE COORDINATION
Mrs. Princess Esteban presented today for pre-auto SCT testing/evaluation. She is here alone. Pt was diagnosed with MM June 2020. She has completed induction therapy & is a candidate for high dose Melphalan chemo followed by auto SCT. We discussed the process of stem cell mobilization, collection, hi-dose chemo and auto HSCT. Explained use of GCSF - how administered, common SE's & how to manage potential symptoms. Pt informed of need for CVC, we discussed required line care post insertion; pt understands that the dressing must be kept dry when showering & that it will remain in place approx 8wks. Pt educated on use of melphalan chemo for further treatment of dz. We discussed toxicities r/t high dose chemo and how these are managed. Pt informed of expected symptoms - fatigue, decreased energy; gradual loss of appetite, N/V. Diarrhea and pancytopenia. She understands shemay require transfusions of PRBC's and/or plts until recovery of antibiotics; Pt informed that she will be quarantined at home following discharge for approx 8 -10 wks. She has arranged for a girlfriend to stay w/her at home the 1st wk following D/C, her brother will come stay with her the 2nd wk after D/C. Spent approx 90 min w/pt. Multiple questions were asked - attempted to address most of these.

## 2020-10-16 NOTE — PROGRESS NOTES
Pt in Outpatient Infusion for labs and education/teach per Josue Rose, transplant coordinator. Labs drawn peripherally per . Pt d/c'd ambulatory to next appointment.

## 2020-10-17 LAB
CYTOMEGALOVIRUS IGM ANTIBODY: <8 AU/ML
HEPATITIS BE ANTIBODY: NEGATIVE
VARICELLA-ZOSTER VIRUS AB, IGG: NORMAL

## 2020-10-18 LAB
HERPES TYPE 1/2 IGM COMBINED: 0.58 IV
HERPES TYPE I/II IGG COMBINED: >22.4 IV

## 2020-10-19 LAB — VARICELLA ZOSTER AB IGM: 0.13 ISR

## 2020-10-22 NOTE — PROCEDURES
[facility] [address], [address]                               PULMONARY FUNCTION    PATIENT NAME: Melvin Andrea                     :        1955  MED REC NO:   [id number]                          ROOM:  ACCOUNT NO:   [de-identified]                           ADMIT DATE: 10/16/2020  PROVIDER:     Arelis Abebe MD    DATE OF PROCEDURE:  10/16/2020    INDICATION:  Multiple myeloma in remission, short of breath. BMI: 24.8    TOBACCO HISTORY:  Never smoked. Spirometry data is acceptable and reproducible. Lung volumes performed via plethysmography. Diffusion capacity adjusted via [address] for hemoglobin of 13.8. Room air oxygen saturation is 97%. SPIROMETRY:  FEV1 to FVC ratio is 86%. FEV1 is 2.45, which is 100% of  predicted. FVC is 2.86, which is 88% of predicted. Lung volumes show total lung capacity of 4.37, which is 84% of  predicted. Residual volume is 1.17, which is 54% of predicted. Diffusion capacity is 20.86, which is 98% of predicted. IMPRESSION:  1. Normal spirometry. 2.  There is a reduction in residual volume. Cannot rule out early  restrictive defect. 3.  Normal diffusion capacity. If clinically indicated, CT chest could be performed for further  evaluation.         Esther Richards MD    D: 10/22/2020 8:35:00       T: 10/22/2020 9:29:10     [email or web address]  Job#: [id number]     Doc#: [id number]    CC:

## 2020-10-23 PROBLEM — C90.00 MULTIPLE MYELOMA NOT HAVING ACHIEVED REMISSION (HCC): Status: ACTIVE | Noted: 2020-10-23

## 2020-10-23 PROBLEM — Z52.011 AUTOLOGOUS DONOR, STEM CELLS: Status: ACTIVE | Noted: 2020-10-23

## 2020-10-23 RX ORDER — PROCHLORPERAZINE MALEATE 10 MG
10 TABLET ORAL EVERY 6 HOURS PRN
Status: CANCELLED | OUTPATIENT
Start: 2020-10-29

## 2020-10-23 RX ORDER — LOPERAMIDE HYDROCHLORIDE 2 MG/1
2 CAPSULE ORAL PRN
Status: CANCELLED | OUTPATIENT
Start: 2020-10-29
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
## 2020-10-27 ENCOUNTER — HOSPITAL ENCOUNTER (OUTPATIENT)
Dept: INTERVENTIONAL RADIOLOGY/VASCULAR | Age: 65
Discharge: HOME | End: 2020-10-27
Payer: MEDICARE

## 2020-10-27 VITALS — TEMPERATURE: 98.5 F | WEIGHT: 144 LBS | HEIGHT: 64 IN | BODY MASS INDEX: 24.59 KG/M2

## 2020-10-27 LAB
HCT VFR BLD CALC: 38.4 % (ref 36–48)
HEMOGLOBIN: 12.7 G/DL (ref 12–16)
INR BLD: 0.9 (ref 0.86–1.14)
MCH RBC QN AUTO: 30.3 PG (ref 26–34)
MCHC RBC AUTO-ENTMCNC: 33.1 G/DL (ref 31–36)
MCV RBC AUTO: 91.6 FL (ref 80–100)
PDW BLD-RTO: 14.2 % (ref 12.4–15.4)
PLATELET # BLD: 239 K/UL (ref 135–450)
PMV BLD AUTO: 7.4 FL (ref 5–10.5)
PROTHROMBIN TIME: 10.4 SEC (ref 10–13.2)
RBC # BLD: 4.2 M/UL (ref 4–5.2)
WBC # BLD: 34.7 K/UL (ref 4–11)

## 2020-10-27 PROCEDURE — C1894 INTRO/SHEATH, NON-LASER: HCPCS

## 2020-10-27 PROCEDURE — 99152 MOD SED SAME PHYS/QHP 5/>YRS: CPT | Performed by: RADIOLOGY

## 2020-10-27 PROCEDURE — 85610 PROTHROMBIN TIME: CPT

## 2020-10-27 PROCEDURE — 2500000003 HC RX 250 WO HCPCS

## 2020-10-27 PROCEDURE — 6360000002 HC RX W HCPCS

## 2020-10-27 PROCEDURE — 77001 FLUOROGUIDE FOR VEIN DEVICE: CPT | Performed by: RADIOLOGY

## 2020-10-27 PROCEDURE — 85027 COMPLETE CBC AUTOMATED: CPT

## 2020-10-27 PROCEDURE — 36558 INSERT TUNNELED CV CATH: CPT | Performed by: RADIOLOGY

## 2020-10-27 PROCEDURE — C1751 CATH, INF, PER/CENT/MIDLINE: HCPCS

## 2020-10-27 NOTE — PROCEDURES
IR Brief Postoperative Note    Dora Goff  YOB: 1955  [id number]    Pre-operative Diagnosis: multiple myeloma    Post-operative Diagnosis: Same    Procedure: right IJ trifusion cath, ok for use    Anesthesia: moderate    Surgeons/Assistants: maylin    Estimated Blood Loss: Minimal    Complications: none    Specimens: were not obtained    See full procedure dictation to follow      Caroline Grimes MD  10/27/2020

## 2020-10-28 ENCOUNTER — HOSPITAL ENCOUNTER (OUTPATIENT)
Dept: ONCOLOGY | Age: 65
Setting detail: INFUSION SERIES
Discharge: HOME | End: 2020-10-28
Payer: MEDICARE

## 2020-10-29 ENCOUNTER — HOSPITAL ENCOUNTER (OUTPATIENT)
Dept: ONCOLOGY | Age: 65
Setting detail: INFUSION SERIES
Discharge: HOME | End: 2020-10-29
Payer: MEDICARE

## 2020-10-29 VITALS
HEART RATE: 85 BPM | SYSTOLIC BLOOD PRESSURE: 125 MMHG | TEMPERATURE: 98.3 F | RESPIRATION RATE: 16 BRPM | DIASTOLIC BLOOD PRESSURE: 78 MMHG | OXYGEN SATURATION: 97 %

## 2020-10-29 DIAGNOSIS — Z52.011 AUTOLOGOUS DONOR, STEM CELLS: ICD-10-CM

## 2020-10-29 DIAGNOSIS — C90.00 MULTIPLE MYELOMA NOT HAVING ACHIEVED REMISSION (HCC): Primary | ICD-10-CM

## 2020-10-29 LAB
A/G RATIO: 1.8 (ref 1.1–2.2)
ALBUMIN SERPL-MCNC: 4.6 G/DL (ref 3.4–5)
ALP BLD-CCNC: 229 U/L (ref 40–129)
ALT SERPL-CCNC: 14 U/L (ref 10–40)
ANION GAP SERPL CALCULATED.3IONS-SCNC: 12 MMOL/L (ref 3–16)
AST SERPL-CCNC: 29 U/L (ref 15–37)
BANDED NEUTROPHILS RELATIVE PERCENT: 19 % (ref 0–7)
BANDED NEUTROPHILS RELATIVE PERCENT: 6 % (ref 0–7)
BASOPHILS ABSOLUTE: 0 K/UL (ref 0–0.2)
BASOPHILS ABSOLUTE: 0 K/UL (ref 0–0.2)
BASOPHILS RELATIVE PERCENT: 0 %
BASOPHILS RELATIVE PERCENT: 0 %
BILIRUB SERPL-MCNC: 0.4 MG/DL (ref 0–1)
BUN BLDV-MCNC: 5 MG/DL (ref 7–20)
CALCIUM SERPL-MCNC: 9.2 MG/DL (ref 8.3–10.6)
CHLORIDE BLD-SCNC: 98 MMOL/L (ref 99–110)
CO2: 26 MMOL/L (ref 21–32)
CREAT SERPL-MCNC: <0.5 MG/DL (ref 0.6–1.2)
DOHLE BODIES: PRESENT
EOSINOPHILS ABSOLUTE: 0.4 K/UL (ref 0–0.6)
EOSINOPHILS ABSOLUTE: 0.9 K/UL (ref 0–0.6)
EOSINOPHILS RELATIVE PERCENT: 1 %
EOSINOPHILS RELATIVE PERCENT: 2 %
GFR AFRICAN AMERICAN: >60
GFR NON-AFRICAN AMERICAN: >60
GLOBULIN: 2.5 G/DL
GLUCOSE BLD-MCNC: 98 MG/DL (ref 70–99)
HCT VFR BLD CALC: 35.8 % (ref 36–48)
HCT VFR BLD CALC: 37.2 % (ref 36–48)
HEMOGLOBIN: 12.1 G/DL (ref 12–16)
HEMOGLOBIN: 12.3 G/DL (ref 12–16)
LYMPHOCYTES ABSOLUTE: 1.1 K/UL (ref 1–5.1)
LYMPHOCYTES ABSOLUTE: 4.4 K/UL (ref 1–5.1)
LYMPHOCYTES RELATIVE PERCENT: 10 %
LYMPHOCYTES RELATIVE PERCENT: 3 %
MAGNESIUM: 2.1 MG/DL (ref 1.8–2.4)
MCH RBC QN AUTO: 29.9 PG (ref 26–34)
MCH RBC QN AUTO: 30.2 PG (ref 26–34)
MCHC RBC AUTO-ENTMCNC: 33 G/DL (ref 31–36)
MCHC RBC AUTO-ENTMCNC: 33.7 G/DL (ref 31–36)
MCV RBC AUTO: 89.8 FL (ref 80–100)
MCV RBC AUTO: 90.7 FL (ref 80–100)
METAMYELOCYTES RELATIVE PERCENT: 1 %
METAMYELOCYTES RELATIVE PERCENT: 4 %
MONOCYTES ABSOLUTE: 2.2 K/UL (ref 0–1.3)
MONOCYTES ABSOLUTE: 4 K/UL (ref 0–1.3)
MONOCYTES RELATIVE PERCENT: 6 %
MONOCYTES RELATIVE PERCENT: 9 %
MYELOCYTE PERCENT: 2 %
MYELOCYTE PERCENT: 3 %
NEUTROPHILS ABSOLUTE: 33.4 K/UL (ref 1.7–7.7)
NEUTROPHILS ABSOLUTE: 34.8 K/UL (ref 1.7–7.7)
NEUTROPHILS RELATIVE PERCENT: 65 %
NEUTROPHILS RELATIVE PERCENT: 69 %
OVALOCYTES: ABNORMAL
PDW BLD-RTO: 14 % (ref 12.4–15.4)
PDW BLD-RTO: 14.2 % (ref 12.4–15.4)
PLATELET # BLD: 119 K/UL (ref 135–450)
PLATELET # BLD: 225 K/UL (ref 135–450)
PMV BLD AUTO: 6.6 FL (ref 5–10.5)
PMV BLD AUTO: 7.5 FL (ref 5–10.5)
POTASSIUM SERPL-SCNC: 3.7 MMOL/L (ref 3.5–5.1)
RBC # BLD: 3.99 M/UL (ref 4–5.2)
RBC # BLD: 4.11 M/UL (ref 4–5.2)
SODIUM BLD-SCNC: 136 MMOL/L (ref 136–145)
TOTAL PROTEIN: 7.1 G/DL (ref 6.4–8.2)
TOXIC GRANULATION: PRESENT
WBC # BLD: 37.1 K/UL (ref 4–11)
WBC # BLD: 44 K/UL (ref 4–11)

## 2020-10-29 PROCEDURE — 38207 CRYOPRESERVE STEM CELLS: CPT

## 2020-10-29 PROCEDURE — 6360000002 HC RX W HCPCS: Performed by: INTERNAL MEDICINE

## 2020-10-29 PROCEDURE — 83735 ASSAY OF MAGNESIUM: CPT

## 2020-10-29 PROCEDURE — 96372 THER/PROPH/DIAG INJ SC/IM: CPT

## 2020-10-29 PROCEDURE — 85025 COMPLETE CBC W/AUTO DIFF WBC: CPT

## 2020-10-29 PROCEDURE — 36592 COLLECT BLOOD FROM PICC: CPT

## 2020-10-29 PROCEDURE — 80053 COMPREHEN METABOLIC PANEL: CPT

## 2020-10-29 PROCEDURE — 96365 THER/PROPH/DIAG IV INF INIT: CPT

## 2020-10-29 PROCEDURE — 2580000003 HC RX 258: Performed by: INTERNAL MEDICINE

## 2020-10-29 PROCEDURE — 96366 THER/PROPH/DIAG IV INF ADDON: CPT

## 2020-10-29 PROCEDURE — 38206 HARVEST AUTO STEM CELLS: CPT

## 2020-10-29 RX ORDER — DOXEPIN HYDROCHLORIDE 10 MG/1
10 CAPSULE ORAL NIGHTLY
COMMUNITY

## 2020-10-29 RX ORDER — LOPERAMIDE HYDROCHLORIDE 2 MG/1
2 CAPSULE ORAL PRN
Status: CANCELLED | OUTPATIENT
Start: 2020-10-30

## 2020-10-29 RX ORDER — ACYCLOVIR 400 MG/1
400 TABLET ORAL 2 TIMES DAILY
Status: ON HOLD | COMMUNITY
End: 2020-11-19 | Stop reason: SDUPTHER

## 2020-10-29 RX ORDER — ESCITALOPRAM OXALATE 10 MG/1
10 TABLET ORAL DAILY
Status: ON HOLD | COMMUNITY
End: 2020-11-19 | Stop reason: HOSPADM

## 2020-10-29 RX ORDER — PROCHLORPERAZINE MALEATE 10 MG
10 TABLET ORAL EVERY 6 HOURS PRN
Status: CANCELLED | OUTPATIENT
Start: 2020-10-30

## 2020-10-29 RX ORDER — LOPERAMIDE HYDROCHLORIDE 2 MG/1
2 CAPSULE ORAL PRN
Status: DISCONTINUED | OUTPATIENT
Start: 2020-10-29 | End: 2020-10-30 | Stop reason: HOSPADM

## 2020-10-29 RX ORDER — PROCHLORPERAZINE MALEATE 10 MG
10 TABLET ORAL EVERY 6 HOURS PRN
Status: DISCONTINUED | OUTPATIENT
Start: 2020-10-29 | End: 2020-10-30 | Stop reason: HOSPADM

## 2020-10-29 RX ORDER — ATORVASTATIN CALCIUM 10 MG/1
10 TABLET, FILM COATED ORAL DAILY
COMMUNITY

## 2020-10-29 RX ADMIN — TBO-FILGRASTIM 300 MCG: 300 INJECTION, SOLUTION SUBCUTANEOUS at 07:03

## 2020-10-29 RX ADMIN — CALCIUM GLUCONATE 4 G: 98 INJECTION, SOLUTION INTRAVENOUS at 08:08

## 2020-10-29 RX ADMIN — TBO-FILGRASTIM 480 MCG: 480 INJECTION, SOLUTION SUBCUTANEOUS at 07:03

## 2020-10-29 NOTE — PROGRESS NOTES
Oncology / Hematology Care - Progress Note      Patient name:   Danie Obrien  Date:     10/29/2020          Danie Obrien  presents today for a routine, scheduled visit. Chief Complaint   Patient presents with    Follow-up     Day #1, collecting autologous stem cells            Indication for Autologous Stem Cell Collection:    · Multiple myeloma          Interval History Since Last Office Visit:    Patient currently collecting, no acute issue  CD34 count was 12    Mobilizing with G-CSF            Review of Systems:    History obtained from patient. Otherwise, comprehensive 10 point review of systems was unremarkable. Physical Exam:    There were no vitals taken for this visit. General appearance: alert and cooperative; no acute distress  Head: NCAT, PERRLA, EOMI; oral and nasopharynx without lesions, dentition adequate repair  Neck: no JVD or thyromegaly  Lungs: clear to auscultation bilaterally; no audible rhonchi, rales, wheezes or crackles  Heart: regular rate and rhythm, S1, S2 normal; no murmurs, rubs or gallops  Abdomen: soft, non-tender and non-distended; normoactive, tympanic bowel sounds; no hepatosplenomegaly  Extremities: no cyanosis, clubbing, edema or asymmetry  Catheter:  Right chest wall Tri fusion        Procedure Note:    Patient underwent a 20L apheresis procedure at a flow rate of 20 ml/min, without side effects or complications. Access was via Right upper chest wall trifusion catheter; intact at the insertion site, no signs of infection. Tolerated well, no issues with flow or catheter related problems. Empirically received calcium gluconate.               Assessment / Plan:      Autologous peripheral blood hematopoietic stem cell donor    Goal:  Greater than 5 million CD34+ stem cells / kg    Await yield from today's collection to determine necessity of future collections      For future use:  [Patient collected  million CD34+ stem cells / kg to give a toal of million CD34+ stem cells.]          Rivera Valle DO, MS  Oncology/Hematology Care    Please contact via:  1. Perfect Serve  2.   Cell Phone:  [phone]    10/29/2020   11:24 AM

## 2020-10-29 NOTE — PLAN OF CARE
Problem: KNOWLEDGE DEFICIT  Goal: Patient/S.O. demonstrates understanding of disease process, treatment plan, medications, and discharge instructions. Outcome: Ongoing  Note: Pt arrived to OP Infusion today for scheduled stem cell pheresis procedure. Apheresis, line care, education, & lab draws all completed by Sagar MARSHALL, Vernell. Review Sagar documentation for details. Patient seen by Dr. Desaen Palacios. Verbalized understanding of d/c instructions. D/C'd ambulatory.

## 2020-10-30 ENCOUNTER — APPOINTMENT (OUTPATIENT)
Dept: ONCOLOGY | Age: 65
End: 2020-10-30
Payer: MEDICARE

## 2020-10-30 ENCOUNTER — HOSPITAL ENCOUNTER (OUTPATIENT)
Dept: ONCOLOGY | Age: 65
Setting detail: INFUSION SERIES
Discharge: HOME | End: 2020-10-30
Payer: MEDICARE

## 2020-10-30 VITALS
RESPIRATION RATE: 18 BRPM | HEART RATE: 82 BPM | SYSTOLIC BLOOD PRESSURE: 125 MMHG | DIASTOLIC BLOOD PRESSURE: 82 MMHG | TEMPERATURE: 98.3 F

## 2020-10-30 PROCEDURE — 6360000002 HC RX W HCPCS: Performed by: INTERNAL MEDICINE

## 2020-10-30 PROCEDURE — 96523 IRRIG DRUG DELIVERY DEVICE: CPT

## 2020-10-30 RX ORDER — HEPARIN SODIUM (PORCINE) LOCK FLUSH IV SOLN 100 UNIT/ML 100 UNIT/ML
300 SOLUTION INTRAVENOUS PRN
Status: DISCONTINUED | OUTPATIENT
Start: 2020-10-30 | End: 2020-10-31 | Stop reason: HOSPADM

## 2020-10-30 RX ADMIN — Medication 300 UNITS: at 11:44

## 2020-10-30 RX ADMIN — Medication 300 UNITS: at 11:46

## 2020-10-30 RX ADMIN — Medication 300 UNITS: at 11:45

## 2020-10-30 NOTE — PROGRESS NOTES
Patient seen in OP Infusion for removal of high dose heparin. Caps to all 3 lumens changed and re-heparinized with 100 units/ml heparin per protocol.

## 2020-11-02 ENCOUNTER — CLINICAL DOCUMENTATION (OUTPATIENT)
Dept: ONCOLOGY | Age: 65
End: 2020-11-02

## 2020-11-04 ENCOUNTER — HOSPITAL ENCOUNTER (INPATIENT)
Age: 65
LOS: 15 days | Discharge: HOME | DRG: 016 | End: 2020-11-19
Attending: INTERNAL MEDICINE | Admitting: INTERNAL MEDICINE
Payer: MEDICARE

## 2020-11-04 ENCOUNTER — APPOINTMENT (OUTPATIENT)
Dept: GENERAL RADIOLOGY | Age: 65
DRG: 016 | End: 2020-11-04
Attending: INTERNAL MEDICINE
Payer: MEDICARE

## 2020-11-04 PROBLEM — C90.01 MULTIPLE MYELOMA IN REMISSION (HCC): Status: ACTIVE | Noted: 2020-11-04

## 2020-11-04 LAB
ABO/RH: NORMAL
ALBUMIN SERPL-MCNC: 4.7 G/DL (ref 3.4–5)
ALBUMIN SERPL-MCNC: 4.7 G/DL (ref 3.4–5)
ALP BLD-CCNC: 96 U/L (ref 40–129)
ALT SERPL-CCNC: 19 U/L (ref 10–40)
ANION GAP SERPL CALCULATED.3IONS-SCNC: 11 MMOL/L (ref 3–16)
ANION GAP SERPL CALCULATED.3IONS-SCNC: 7 MMOL/L (ref 3–16)
ANTIBODY SCREEN: NORMAL
APTT: 34.4 SEC (ref 24.2–36.2)
AST SERPL-CCNC: 22 U/L (ref 15–37)
BASOPHILS ABSOLUTE: 0 K/UL (ref 0–0.2)
BASOPHILS RELATIVE PERCENT: 0.6 %
BILIRUB SERPL-MCNC: 0.8 MG/DL (ref 0–1)
BILIRUBIN DIRECT: <0.2 MG/DL (ref 0–0.3)
BILIRUBIN URINE: NEGATIVE
BILIRUBIN, INDIRECT: NORMAL MG/DL (ref 0–1)
BLOOD, URINE: NEGATIVE
BUN BLDV-MCNC: 11 MG/DL (ref 7–20)
BUN BLDV-MCNC: 9 MG/DL (ref 7–20)
CALCIUM SERPL-MCNC: 8.5 MG/DL (ref 8.3–10.6)
CALCIUM SERPL-MCNC: 9.2 MG/DL (ref 8.3–10.6)
CHLORIDE BLD-SCNC: 100 MMOL/L (ref 99–110)
CHLORIDE BLD-SCNC: 107 MMOL/L (ref 99–110)
CLARITY: CLEAR
CO2: 22 MMOL/L (ref 21–32)
CO2: 25 MMOL/L (ref 21–32)
COLOR: YELLOW
CREAT SERPL-MCNC: <0.5 MG/DL (ref 0.6–1.2)
CREAT SERPL-MCNC: <0.5 MG/DL (ref 0.6–1.2)
EKG ATRIAL RATE: 83 BPM
EKG DIAGNOSIS: NORMAL
EKG P AXIS: 53 DEGREES
EKG P-R INTERVAL: 136 MS
EKG Q-T INTERVAL: 396 MS
EKG QRS DURATION: 84 MS
EKG QTC CALCULATION (BAZETT): 465 MS
EKG R AXIS: 21 DEGREES
EKG T AXIS: 55 DEGREES
EKG VENTRICULAR RATE: 83 BPM
EOSINOPHILS ABSOLUTE: 0.1 K/UL (ref 0–0.6)
EOSINOPHILS RELATIVE PERCENT: 1.6 %
GFR AFRICAN AMERICAN: >60
GFR AFRICAN AMERICAN: >60
GFR NON-AFRICAN AMERICAN: >60
GFR NON-AFRICAN AMERICAN: >60
GLUCOSE BLD-MCNC: 109 MG/DL (ref 70–99)
GLUCOSE BLD-MCNC: 175 MG/DL (ref 70–99)
GLUCOSE URINE: NEGATIVE MG/DL
HCT VFR BLD CALC: 36.8 % (ref 36–48)
HEMOGLOBIN: 12.7 G/DL (ref 12–16)
INR BLD: 0.96 (ref 0.86–1.14)
KETONES, URINE: ABNORMAL MG/DL
LACTATE DEHYDROGENASE: 252 U/L (ref 100–190)
LEUKOCYTE ESTERASE, URINE: NEGATIVE
LYMPHOCYTES ABSOLUTE: 1 K/UL (ref 1–5.1)
LYMPHOCYTES RELATIVE PERCENT: 19.9 %
MAGNESIUM: 2.2 MG/DL (ref 1.8–2.4)
MCH RBC QN AUTO: 30.6 PG (ref 26–34)
MCHC RBC AUTO-ENTMCNC: 34.4 G/DL (ref 31–36)
MCV RBC AUTO: 89.1 FL (ref 80–100)
MICROSCOPIC EXAMINATION: ABNORMAL
MONOCYTES ABSOLUTE: 0.9 K/UL (ref 0–1.3)
MONOCYTES RELATIVE PERCENT: 17.5 %
NEUTROPHILS ABSOLUTE: 3.1 K/UL (ref 1.7–7.7)
NEUTROPHILS RELATIVE PERCENT: 60.4 %
NITRITE, URINE: NEGATIVE
PDW BLD-RTO: 13.7 % (ref 12.4–15.4)
PH UA: 6 (ref 5–8)
PHOSPHORUS: 2.6 MG/DL (ref 2.5–4.9)
PHOSPHORUS: 3.5 MG/DL (ref 2.5–4.9)
PLATELET # BLD: 193 K/UL (ref 135–450)
PMV BLD AUTO: 8.4 FL (ref 5–10.5)
POTASSIUM SERPL-SCNC: 3.3 MMOL/L (ref 3.5–5.1)
POTASSIUM SERPL-SCNC: 5.8 MMOL/L (ref 3.5–5.1)
PROTEIN UA: NEGATIVE MG/DL
PROTHROMBIN TIME: 11.1 SEC (ref 10–13.2)
RBC # BLD: 4.13 M/UL (ref 4–5.2)
SODIUM BLD-SCNC: 136 MMOL/L (ref 136–145)
SODIUM BLD-SCNC: 136 MMOL/L (ref 136–145)
SPECIFIC GRAVITY UA: 1.01 (ref 1–1.03)
TOTAL PROTEIN: 7.3 G/DL (ref 6.4–8.2)
URIC ACID, SERUM: 4 MG/DL (ref 2.6–6)
URINE TYPE: ABNORMAL
UROBILINOGEN, URINE: 0.2 E.U./DL
WBC # BLD: 5.1 K/UL (ref 4–11)

## 2020-11-04 PROCEDURE — 85730 THROMBOPLASTIN TIME PARTIAL: CPT

## 2020-11-04 PROCEDURE — 83615 LACTATE (LD) (LDH) ENZYME: CPT

## 2020-11-04 PROCEDURE — 86850 RBC ANTIBODY SCREEN: CPT

## 2020-11-04 PROCEDURE — 2580000003 HC RX 258: Performed by: INTERNAL MEDICINE

## 2020-11-04 PROCEDURE — 6370000000 HC RX 637 (ALT 250 FOR IP): Performed by: NURSE PRACTITIONER

## 2020-11-04 PROCEDURE — 86901 BLOOD TYPING SEROLOGIC RH(D): CPT

## 2020-11-04 PROCEDURE — 81003 URINALYSIS AUTO W/O SCOPE: CPT

## 2020-11-04 PROCEDURE — 6370000000 HC RX 637 (ALT 250 FOR IP): Performed by: INTERNAL MEDICINE

## 2020-11-04 PROCEDURE — 80069 RENAL FUNCTION PANEL: CPT

## 2020-11-04 PROCEDURE — 2580000003 HC RX 258: Performed by: NURSE PRACTITIONER

## 2020-11-04 PROCEDURE — 80076 HEPATIC FUNCTION PANEL: CPT

## 2020-11-04 PROCEDURE — 85025 COMPLETE CBC W/AUTO DIFF WBC: CPT

## 2020-11-04 PROCEDURE — 86900 BLOOD TYPING SEROLOGIC ABO: CPT

## 2020-11-04 PROCEDURE — 2060000000 HC ICU INTERMEDIATE R&B

## 2020-11-04 PROCEDURE — 93010 ELECTROCARDIOGRAM REPORT: CPT | Performed by: INTERNAL MEDICINE

## 2020-11-04 PROCEDURE — 94150 VITAL CAPACITY TEST: CPT

## 2020-11-04 PROCEDURE — 84100 ASSAY OF PHOSPHORUS: CPT

## 2020-11-04 PROCEDURE — 83735 ASSAY OF MAGNESIUM: CPT

## 2020-11-04 PROCEDURE — 6360000002 HC RX W HCPCS: Performed by: INTERNAL MEDICINE

## 2020-11-04 PROCEDURE — 71046 X-RAY EXAM CHEST 2 VIEWS: CPT

## 2020-11-04 PROCEDURE — 6360000002 HC RX W HCPCS: Performed by: NURSE PRACTITIONER

## 2020-11-04 PROCEDURE — 94761 N-INVAS EAR/PLS OXIMETRY MLT: CPT

## 2020-11-04 PROCEDURE — 85610 PROTHROMBIN TIME: CPT

## 2020-11-04 PROCEDURE — 84550 ASSAY OF BLOOD/URIC ACID: CPT

## 2020-11-04 PROCEDURE — 93005 ELECTROCARDIOGRAM TRACING: CPT | Performed by: NURSE PRACTITIONER

## 2020-11-04 RX ORDER — FLUCONAZOLE 200 MG/1
400 TABLET ORAL DAILY
Status: DISCONTINUED | OUTPATIENT
Start: 2020-11-06 | End: 2020-11-17

## 2020-11-04 RX ORDER — MAGNESIUM SULFATE IN WATER 40 MG/ML
4 INJECTION, SOLUTION INTRAVENOUS PRN
Status: DISCONTINUED | OUTPATIENT
Start: 2020-11-04 | End: 2020-11-19 | Stop reason: HOSPADM

## 2020-11-04 RX ORDER — POTASSIUM CHLORIDE 29.8 MG/ML
20 INJECTION INTRAVENOUS PRN
Status: DISCONTINUED | OUTPATIENT
Start: 2020-11-04 | End: 2020-11-19 | Stop reason: HOSPADM

## 2020-11-04 RX ORDER — DEXTROSE AND SODIUM CHLORIDE 5; .45 G/100ML; G/100ML
INJECTION, SOLUTION INTRAVENOUS CONTINUOUS
Status: DISCONTINUED | OUTPATIENT
Start: 2020-11-04 | End: 2020-11-08

## 2020-11-04 RX ORDER — DEXTROSE AND SODIUM CHLORIDE 5; .45 G/100ML; G/100ML
1000 INJECTION, SOLUTION INTRAVENOUS CONTINUOUS
Status: DISPENSED | OUTPATIENT
Start: 2020-11-04 | End: 2020-11-04

## 2020-11-04 RX ORDER — ESCITALOPRAM OXALATE 10 MG/1
10 TABLET ORAL DAILY
Status: DISCONTINUED | OUTPATIENT
Start: 2020-11-04 | End: 2020-11-04

## 2020-11-04 RX ORDER — SODIUM CHLORIDE 0.9 % (FLUSH) 0.9 %
10 SYRINGE (ML) INJECTION EVERY 12 HOURS SCHEDULED
Status: DISCONTINUED | OUTPATIENT
Start: 2020-11-04 | End: 2020-11-19 | Stop reason: HOSPADM

## 2020-11-04 RX ORDER — ONDANSETRON HYDROCHLORIDE 8 MG/1
24 TABLET, FILM COATED ORAL ONCE
Status: COMPLETED | OUTPATIENT
Start: 2020-11-04 | End: 2020-11-04

## 2020-11-04 RX ORDER — PROCHLORPERAZINE EDISYLATE 5 MG/ML
10 INJECTION INTRAMUSCULAR; INTRAVENOUS EVERY 4 HOURS PRN
Status: DISCONTINUED | OUTPATIENT
Start: 2020-11-04 | End: 2020-11-08

## 2020-11-04 RX ORDER — PROCHLORPERAZINE MALEATE 10 MG
10 TABLET ORAL EVERY 4 HOURS PRN
Status: DISCONTINUED | OUTPATIENT
Start: 2020-11-04 | End: 2020-11-08

## 2020-11-04 RX ORDER — DOXEPIN HYDROCHLORIDE 10 MG/1
10 CAPSULE ORAL NIGHTLY
Status: DISCONTINUED | OUTPATIENT
Start: 2020-11-04 | End: 2020-11-19 | Stop reason: HOSPADM

## 2020-11-04 RX ORDER — POTASSIUM CHLORIDE 20 MEQ/1
40 TABLET, EXTENDED RELEASE ORAL ONCE
Status: COMPLETED | OUTPATIENT
Start: 2020-11-04 | End: 2020-11-04

## 2020-11-04 RX ORDER — SODIUM CHLORIDE 9 MG/ML
INJECTION, SOLUTION INTRAVENOUS CONTINUOUS PRN
Status: DISCONTINUED | OUTPATIENT
Start: 2020-11-04 | End: 2020-11-06

## 2020-11-04 RX ORDER — FUROSEMIDE 10 MG/ML
40 INJECTION INTRAMUSCULAR; INTRAVENOUS EVERY 12 HOURS
Status: DISCONTINUED | OUTPATIENT
Start: 2020-11-05 | End: 2020-11-09

## 2020-11-04 RX ORDER — LISINOPRIL AND HYDROCHLOROTHIAZIDE 12.5; 1 MG/1; MG/1
1 TABLET ORAL DAILY
Status: DISCONTINUED | OUTPATIENT
Start: 2020-11-05 | End: 2020-11-09

## 2020-11-04 RX ORDER — ONDANSETRON HYDROCHLORIDE 8 MG/1
24 TABLET, FILM COATED ORAL ONCE
Status: COMPLETED | OUTPATIENT
Start: 2020-11-05 | End: 2020-11-05

## 2020-11-04 RX ORDER — LORAZEPAM 0.5 MG/1
0.5 TABLET ORAL EVERY 4 HOURS PRN
Status: DISCONTINUED | OUTPATIENT
Start: 2020-11-04 | End: 2020-11-19 | Stop reason: HOSPADM

## 2020-11-04 RX ORDER — DEXAMETHASONE 4 MG/1
12 TABLET ORAL ONCE
Status: COMPLETED | OUTPATIENT
Start: 2020-11-04 | End: 2020-11-04

## 2020-11-04 RX ORDER — LORAZEPAM 2 MG/ML
0.5 INJECTION INTRAMUSCULAR EVERY 4 HOURS PRN
Status: DISCONTINUED | OUTPATIENT
Start: 2020-11-04 | End: 2020-11-19 | Stop reason: HOSPADM

## 2020-11-04 RX ORDER — VALACYCLOVIR HYDROCHLORIDE 500 MG/1
500 TABLET, FILM COATED ORAL 2 TIMES DAILY
Status: DISCONTINUED | OUTPATIENT
Start: 2020-11-04 | End: 2020-11-19 | Stop reason: HOSPADM

## 2020-11-04 RX ORDER — DEXAMETHASONE 4 MG/1
12 TABLET ORAL ONCE
Status: COMPLETED | OUTPATIENT
Start: 2020-11-05 | End: 2020-11-05

## 2020-11-04 RX ORDER — MULTIVITAMIN WITH IRON
1 TABLET ORAL DAILY
Status: DISCONTINUED | OUTPATIENT
Start: 2020-11-04 | End: 2020-11-13

## 2020-11-04 RX ORDER — AMLODIPINE BESYLATE 5 MG/1
5 TABLET ORAL DAILY
Status: DISCONTINUED | OUTPATIENT
Start: 2020-11-05 | End: 2020-11-09

## 2020-11-04 RX ADMIN — Medication 10 ML: at 10:00

## 2020-11-04 RX ADMIN — ENOXAPARIN SODIUM 40 MG: 40 INJECTION SUBCUTANEOUS at 18:16

## 2020-11-04 RX ADMIN — FOSAPREPITANT 150 MG: 150 INJECTION, POWDER, LYOPHILIZED, FOR SOLUTION INTRAVENOUS at 15:12

## 2020-11-04 RX ADMIN — VALACYCLOVIR HYDROCHLORIDE 500 MG: 500 TABLET, FILM COATED ORAL at 21:26

## 2020-11-04 RX ADMIN — DEXTROSE AND SODIUM CHLORIDE 1000 ML: 5; 450 INJECTION, SOLUTION INTRAVENOUS at 11:15

## 2020-11-04 RX ADMIN — SODIUM CHLORIDE 15 ML: 900 IRRIGANT IRRIGATION at 17:26

## 2020-11-04 RX ADMIN — ONDANSETRON HYDROCHLORIDE 24 MG: 8 TABLET, FILM COATED ORAL at 15:11

## 2020-11-04 RX ADMIN — POTASSIUM CHLORIDE 40 MEQ: 20 TABLET, EXTENDED RELEASE ORAL at 15:59

## 2020-11-04 RX ADMIN — DEXTROSE AND SODIUM CHLORIDE: 5; 450 INJECTION, SOLUTION INTRAVENOUS at 14:39

## 2020-11-04 RX ADMIN — DOXEPIN HYDROCHLORIDE 10 MG: 10 CAPSULE ORAL at 20:26

## 2020-11-04 RX ADMIN — DEXAMETHASONE 12 MG: 4 TABLET ORAL at 15:11

## 2020-11-04 RX ADMIN — MELPHALAN HYDROCHLORIDE 200 MG: KIT INTRAVENOUS at 15:59

## 2020-11-04 ASSESSMENT — PAIN SCALES - GENERAL
PAINLEVEL_OUTOF10: 0

## 2020-11-04 NOTE — PLAN OF CARE
Problem: Falls - Risk of:  Goal: Will remain free from falls  Description: Will remain free from falls  11/4/2020 1742 by Pema Giles RN  Note: Orthostatic vital signs obtained at start of shift - see flowsheet for details. Pt does not meet criteria for orthostasis. Pt is a Med fall risk. See Terald Median Fall Score and ABCDS Injury Risk assessments. - Screening for Orthostasis AND not a [place] Risk per COPPOLA/ABCDS: Pt bed is in low position, side rails up, call light and belongings are in reach. Fall risk light is on outside pts room. Pt encouraged to call for assistance as needed. Will continue with hourly rounds for PO intake, pain needs, toileting and repositioning as needed. Problem: Bleeding:  Goal: Will show no signs and symptoms of excessive bleeding  Description: Will show no signs and symptoms of excessive bleeding  11/4/2020 1742 by Pema Giles RN  Note: Patient's hemoglobin this AM:   Recent Labs     11/04/20  1000   HGB 12.7     Patient's platelet count this AM:   Recent Labs     11/04/20  1000       Thrombocytopenia Precautions in place. Patient showing no signs or symptoms of active bleeding. Transfusion not indicated at this time. Patient verbalizes understanding of all instructions. Will continue to assess and implement POC. Call light within reach and hourly rounding in place. Problem: PROTECTIVE PRECAUTIONS  Goal: Patient will remain free of nosocomial Infections  11/4/2020 1742 by Pema Giles RN  Note: Pt remains in protective precautions. Pt educated on wearing mask when in hallways. Pt, staff, and visitors adhering to handwashing guidelines. Pt educated to shower or bathe daily with chlorhexidine and linens changed daily per protocol. Pt verbalizes understanding of low microbial diet. Will continue to monitor.         Problem: Infection - Central Venous Catheter-Associated Bloodstream Infection:  Goal: Will show no infection signs and symptoms  Description: Will show no infection signs and symptoms  Note: Pt afebrile throughout shift. VSS. No sign of redness, tenderness, or drainage at line site. Will continue to monitor.

## 2020-11-04 NOTE — H&P
[facility] History and Physical        Attending Physician: Sivakumar Mancilla DO    Primary Care: Bindu Tiwari MD       Referring MD: No referring provider defined for this encounter. Name: Jorge Russell :    MRN:  [id number]    Admission: 2020      Date: 2020    Reason for Admission: High Dose Melphalan preparative regimen followed by Autologous Stem Cell Transplant for Smoldering IgG Lambda Multiple Myeloma    History of Present Illness:   Av Perez is a 75 yo woman with diagnosis of IgG Lambda Multiple Myeloma admitted today for consolidative melphalan & autologous stem cell transplant. Other PMH is significant for JULIETH, OA, HTN, HLD, insomnia, osteopenia, & Vit D deficiency. She was first diagnosed with myeloma 2020 after presenting for regular checkup and her bloodwork was found to be abnormal for elevated total protein. W/u for myeloma was then pursued and is as below. With these findings she was diagnosed with IgG Lambda Multiple Myeloma & started on systemic tx with RVD. At this point she has completed 4 cycles of tx () and has achieved a ME. Plan will be to administer 200mg/m2 of melphalan followed by infusion of  *** x10^6 [id number]/kg autologous PBSCs on 20. On admission she is feeling well and denies any major complaints or concerns. She denies n/v/d/constipation. Denies SOB/FRASER/Chest pain. Denies fevers/chills/night sweats. She is in agreement with plan as outlined above and is ready to proceed.       Pre Transplant Workup:      Pre Transplant labs:   10/16/2020 11:14   CMV IgM <8.0   VARICELLA ZOSTER AB IGM 0.13   Herpes Type 1/2 IgM Combined 0.58   Hep A IgM Non-reactive   Hep B E Ab Negative   Hep B S Ab <3.50   Herpes Type I/II IgG Combined >22.40   Varicella-Zoster Virus Ab, Igg Immune       Past Surgical History:   Procedure Laterality Date    APPENDECTOMY      FRACTURE SURGERY      foot    IR TUNNELED CATHETER PLACEMENT GREATER THAN 5 YEARS  10/27/2020    IR TUNNELED CATHETER PLACEMENT GREATER THAN 5 YEARS 10/27/2020 [facility]    SALPINGO-OOPHORECTOMY Bilateral 10/5/2020    ROBOTIC LAPAROSCOPIC BILATERAL SALPINGO OOPHORECTOMY performed by Woodard Riedel, MD at [address]         Past Medical History:   Diagnosis Date    Cancer ([facility] [address]     Hyperlipidemia     Hypertension     PONV (postoperative nausea and vomiting)        Prior to Admission medications    Medication Sig Start Date End Date Taking? Authorizing Provider   atorvastatin (LIPITOR) 10 MG tablet Take 10 mg by mouth daily   Yes Historical Provider, MD   acyclovir (ZOVIRAX) 400 MG tablet Take 400 mg by mouth 2 times daily   Yes Historical Provider, MD   doxepin (SINEQUAN) 10 MG capsule Take 10 mg by mouth nightly   Yes Historical Provider, MD   amLODIPine (NORVASC) 5 MG tablet Take 5 mg by mouth   Yes Historical Provider, MD   lisinopril-hydrochlorothiazide (PRINZIDE;ZESTORETIC) 10-12.5 MG per tablet Take 1 tablet by mouth   Yes Historical Provider, MD   Multiple Vitamin (MULTIVITAMIN) tablet Take 1 tablet by mouth   Yes Historical Provider, MD   Calcium Carbonate-Vitamin D (CALCIUM-VITAMIN D) 500-200 MG-UNIT per tablet Take 1 tablet by mouth   Yes Historical Provider, MD   escitalopram (LEXAPRO) 10 MG tablet Take 10 mg by mouth daily    Historical Provider, MD   Zoledronic Acid (ZOMETA IV) Infuse intravenously every 30 days    Historical Provider, MD   ibuprofen (ADVIL;MOTRIN) 600 MG tablet Take 1 tablet by mouth every 6 hours 10/5/20   Woodard Riedel, MD   acetaminophen (TYLENOL) 500 MG tablet Take 1 tablet by mouth every 6 hours as needed for Pain 10/5/20   Woodard Riedel, MD   Pseudoephedrine-Guaifenesin ([address] MAX STRENGTH PO) Take by mouth    Historical Provider, MD       Allergies   Allergen Reactions    Trazodone Itching       No family history on file.      Social History     Socioeconomic History    Marital status:      Spouse name: Not on file    Number of children: Not on file    Years of education: Not on file    Highest education level: Not on file   Occupational History    Not on file   Social Needs    Financial resource strain: Not on file    Food insecurity     Worry: Not on file     Inability: Not on file    Transportation needs     Medical: Not on file     Non-medical: Not on file   Tobacco Use    Smoking status: Never Smoker    Smokeless tobacco: Never Used   Substance and Sexual Activity    Alcohol use: Yes     Comment: social    Drug use: Never    Sexual activity: Not on file   Lifestyle    Physical activity     Days per week: Not on file     Minutes per session: Not on file    Stress: Not on file   Relationships    Social connections     Talks on phone: Not on file     Gets together: Not on file     Attends Alevism service: Not on file     Active member of club or organization: Not on file     Attends meetings of clubs or organizations: Not on file     Relationship status: Not on file    Intimate partner violence     Fear of current or ex partner: Not on file     Emotionally abused: Not on file     Physically abused: Not on file     Forced sexual activity: Not on file   Other Topics Concern    Not on file   Social History Narrative    Not on file        ROS:  As noted above, otherwise remainder of 10-point ROS negative      Physical Exam:     Vital Signs:  BP (!) 141/87   Pulse 98   Temp 97.7 °F (36.5 °C) (Oral)   Resp 16   Wt 144 lb (65.3 kg)   BMI 24.72 kg/m²     Weight:    Wt Readings from Last 3 Encounters:   11/04/20 144 lb (65.3 kg)   10/27/20 144 lb (65.3 kg)   10/16/20 149 lb 7.6 oz (67.8 kg)       KPS: 90% Able to carry on normal activity; minor signs or symptoms of disease    ECOG PS:  (1) Restricted in physically strenuous activity, ambulatory and able to do work of light nature    General: Awake, alert and oriented.   HEENT: normocephalic, alopecia, PERRL, no scleral erythema or icterus, Oral mucosa moist and intact, throat clear  NECK: supple without palpable adenopathy  BACK: Straight negative CVAT  SKIN: warm dry and intact without lesions rashes or masses  CHEST: CTA bilaterally without use of accessory muscles  CV: Normal S1 S2, RRR, no MRG  ABD: NT ND normoactive BS, no palpable masses or hepatosplenomegaly  EXTREMITIES: without edema, denies calf tenderness  NEURO: CN II - XII grossly intact  CATHETER: RIJ Trifusion (IR, 10/27/20) - CDI    Laboratory Data:  CBC:   No results for input(s): WBC, HGB, HCT, MCV, PLT in the last 72 hours. BMP/Mag:  No results for input(s): NA, K, CL, CO2, PHOS, BUN, CREATININE, MG in the last 72 hours. Invalid input(s): CA  LIVP:   No results for input(s): AST, ALT, LIPASE, BILIDIR, BILITOT, ALKPHOS in the last 72 hours. Invalid input(s): AMYLASE,  ALB  Coags: No results for input(s): PROTIME, INR, APTT in the last 72 hours. Uric Acid No results for input(s): LABURIC in the last 72 hours. PROBLEM LIST:            1. IgG Lambda smoldering multiple myeloma  2. JULIETH  3. OA  4. HTN  5. HLD  6. Insomnia  7. Osteopenia  8. Vit D deficiency. TREATMENT:            1. RVD x4 cycles (June-Sept 2020)  2. HD Melphalan 200mg/m2 f/b autologous PBSC infusion 11/6/20    ASSESSMENT AND PLAN:            1. IgG Lambda Smoldering Myeloma: DE  - Admitted for autologous SCT 11/6/20    - Post-Txp Maintenance: USOR trial - estephania/kika vs kika maintenance    Dr. Roderick Petit has discussed the risks associated with transplant which include the risk of infection, bleeding and inability to obtain a long term remission. He understands these risks and is willing to proceed. The consent is signed and on the chart. The patient was counseled at length about the risks of boaz Covid-19 during their peritransplant period and any recovery window from transplant.   The patient was made aware that boaz Covid-19  may worsen their prognosis for recovering from their transplant and lend to a higher morbidity and/or mortality risk. All material risks, benefits, and reasonable alternatives including postponing the transplant were discussed. The patient does wish to proceed with the transplant at this time. Day - 2    2. ID:  Afebrile, no evidence of infection.    - Begin Valtrex, cont acyclovir at discharge for VZV positive titer.    - Start Diflucan ppx on 11/6/20  - Start Levaquin ppx when [address] < 1.5    3. Heme: ***  - Transfuse for Hgb < 7 and Platelets < 70Z  - No transfusion today    4. Metabolic: Electrolytes and renal fxn stable. - Start IVFs: ***  - Replace potassium and magnesium per PRN orders    5. Cardiac:   - H/o HTN & HLD  - Cont amlodipine 5mg, lisinopril-HCTZ 10-12.5mg  - Hold statin marj-transplant     6. GI / Nutrition:  Appetite and oral intake is good. - Start low microbial diet   - Follow closely with dietary    7. Insomnia:  - Cont melatonin PRN      - DVT Prophylaxis: Platelets >59,682 cells/dL, - daily lovenox prophylaxis ordered  Contraindications to pharmacologic prophylaxis: None  Contraindications to mechanical prophylaxis: None    - Disposition:  Once ANC >1 and recovered from toxicities of transplant    The patient was seen and examined by Dr. Joe Lane. This admission history and physical has been discussed and agreed upon by Dr. Joe Lane.

## 2020-11-04 NOTE — CARE COORDINATION
Type of Admission  Multiple Myeloma IgG  Melphalan Auto ( 3 [address], T:0, 11/6)  Day -2        Central venous catheter  Right IJ Tri fusion (10/27/20, IR)        Plan    Proceed with Melphalan Auto SCT for treatment of Multiple Myeloma      Update  11/4/20:  Planned admission for Melphalan Auto SCT. Understands need for cryotherapy with Melphalan. Education  11/4/20: Introduced myself in RN D/C Planner role.  Discussed length of stay & expected recovery, Reviewed infusion of stem cells, pre-medications, telemetry & frequent vital signs during infusion      Discharge  When ANC is >1.0 & without toxicities        Pending

## 2020-11-04 NOTE — PROGRESS NOTES
Patient admitted to [address] [place] [address] via direct admit from home for diagnosis of Multiplpe Myeloma. Here to receive chemotherapy regimen Melphalan. Original chemotherapy orders reviewed and acknowledged. Appropriateness of chemotherapy treatment regimen Melphalan for diagnosis of multiple myeloma was verified. Patient educated on chemotherapy regimen. Consent for chemotherapy obtained. Estimated body surface area is 1.71 meters squared as calculated from the following:    Height as of this encounter: 5' 3\" (1.6 m). Weight as of this encounter: 144 lb 1.6 oz (65.4 kg). verified. Appropriate dosing calculations of chemotherapy based on above height, weight, and BSA verified. Patient  oriented to patient room including call light and bed controls. Admission assessment completed - see admission flowsheet documentation. Patient is a medium fall risk. Safety measures instituted per policy. Patient oriented to unit policies and procedures including: pain management practices, unit safety precautions, family rapid response, q4h vital signs and assessments, daily 4am lab draws, weekly chest x-rays, weekly VRE rectal swabs for surveillance, daily chlorhexidine bathing, standing transfusion orders, and routine central line care. Also discussed use of call light and how to get in touch with nursing staff. Stressed the importance of calling out immediately for any changes in condition including but not limited to: pain, chills, fever, nausea, vomiting, diarrhea, chest pain, sob/harkins, assistance with toileting, bleeding, or any other symptoms that are out of the ordinary for the patient. Patient verbalizes understanding of all instructions and will call for assistance as needed.

## 2020-11-04 NOTE — H&P
[address] History and Physical        Attending Physician: Aj Steven DO    Primary Care: Sidney Montelongo MD       Referring MD: No referring provider defined for this encounter. Name: Freya Burnett :  [id number]  MRN:  [id number]    Admission: 2020      Date: 2020    Reason for Admission: High Dose Melphalan preparative regimen followed by Autologous Stem Cell Transplant for Smoldering IgG Lambda Multiple Myeloma    History of Present Illness:   Joe Gonzalez is a 77 yo woman with diagnosis of IgG Lambda Multiple Myeloma admitted today for consolidative melphalan & autologous stem cell transplant. Other PMH is significant for JULIETH, OA, HTN, HLD, insomnia, osteopenia, & Vit D deficiency. She was first diagnosed with myeloma 2020 after presenting for regular checkup and her bloodwork was found to be abnormal for elevated total protein. W/u for myeloma was then pursued and is as below. With these findings she was diagnosed with IgG Lambda Multiple Myeloma & started on systemic tx with RVD. At this point she has completed 4 cycles of tx (-2020) and has achieved a MN. Plan will be to administer 200mg/m2 of melphalan followed by infusion of  3.85  x10^6 [id number]/kg autologous PBSCs on 20. On admission she is feeling well and denies any major complaints or concerns. She denies n/v/d/constipation. Denies SOB/FRASER/Chest pain. Denies fevers/chills/night sweats. She is in agreement with plan as outlined above and is ready to proceed.       Pre Transplant Workup:      Pre Transplant labs:   10/16/2020 11:14   CMV IgM <8.0   VARICELLA ZOSTER AB IGM 0.13   Herpes Type 1/2 IgM Combined 0.58   Hep A IgM Non-reactive   Hep B E Ab Negative   Hep B S Ab <3.50   Herpes Type I/II IgG Combined >22.40   Varicella-Zoster Virus Ab, Igg Immune       Past Surgical History:   Procedure Laterality Date    APPENDECTOMY      FRACTURE SURGERY      foot    IR TUNNELED CATHETER PLACEMENT GREATER THAN 5 YEARS  10/27/2020    IR TUNNELED CATHETER PLACEMENT GREATER THAN 5 YEARS 10/27/2020 [facility] PROCED[facility]    SALPINGO-OOPHORECTOMY Bilateral 10/5/2020    ROBOTIC LAPAROSCOPIC BILATERAL SALPINGO OOPHORECTOMY performed by Carol Mcnally MD at [address]         Past Medical History:   Diagnosis Date    Cancer ([facility] [address]     Hyperlipidemia     Hypertension     PONV (postoperative nausea and vomiting)        Prior to Admission medications    Medication Sig Start Date End Date Taking? Authorizing Provider   atorvastatin (LIPITOR) 10 MG tablet Take 10 mg by mouth daily   Yes Historical Provider, MD   acyclovir (ZOVIRAX) 400 MG tablet Take 400 mg by mouth 2 times daily   Yes Historical Provider, MD   doxepin (SINEQUAN) 10 MG capsule Take 10 mg by mouth nightly   Yes Historical Provider, MD   amLODIPine (NORVASC) 5 MG tablet Take 5 mg by mouth   Yes Historical Provider, MD   lisinopril-hydrochlorothiazide (PRINZIDE;ZESTORETIC) 10-12.5 MG per tablet Take 1 tablet by mouth   Yes Historical Provider, MD   Multiple Vitamin (MULTIVITAMIN) tablet Take 1 tablet by mouth   Yes Historical Provider, MD   Calcium Carbonate-Vitamin D (CALCIUM-VITAMIN D) 500-200 MG-UNIT per tablet Take 1 tablet by mouth   Yes Historical Provider, MD   escitalopram (LEXAPRO) 10 MG tablet Take 10 mg by mouth daily    Historical Provider, MD   Zoledronic Acid (ZOMETA IV) Infuse intravenously every 30 days    Historical Provider, MD   ibuprofen (ADVIL;MOTRIN) 600 MG tablet Take 1 tablet by mouth every 6 hours 10/5/20   Carol Mcnally MD   acetaminophen (TYLENOL) 500 MG tablet Take 1 tablet by mouth every 6 hours as needed for Pain 10/5/20   Carol Mcnally MD   Pseudoephedrine-Guaifenesin ([place] [address] MAX STRENGTH PO) Take by mouth    Historical Provider, MD       Allergies   Allergen Reactions    Trazodone Itching       No family history on file.      Social History     Socioeconomic History    Marital status:       Spouse name: Not on file    Number of children: Not on file    Years of education: Not on file    Highest education level: Not on file   Occupational History    Not on file   Social Needs    Financial resource strain: Not on file    Food insecurity     Worry: Not on file     Inability: Not on file    Transportation needs     Medical: Not on file     Non-medical: Not on file   Tobacco Use    Smoking status: Never Smoker    Smokeless tobacco: Never Used   Substance and Sexual Activity    Alcohol use: Yes     Comment: social    Drug use: Never    Sexual activity: Not on file   Lifestyle    Physical activity     Days per week: Not on file     Minutes per session: Not on file    Stress: Not on file   Relationships    Social connections     Talks on phone: Not on file     Gets together: Not on file     Attends Gnosticist service: Not on file     Active member of club or organization: Not on file     Attends meetings of clubs or organizations: Not on file     Relationship status: Not on file    Intimate partner violence     Fear of current or ex partner: Not on file     Emotionally abused: Not on file     Physically abused: Not on file     Forced sexual activity: Not on file   Other Topics Concern    Not on file   Social History Narrative    Not on file        ROS:  As noted above, otherwise remainder of 10-point ROS negative      Physical Exam:     Vital Signs:  BP (!) 141/87   Pulse 98   Temp 97.7 °F (36.5 °C) (Oral)   Resp 16   Ht 5' 3\" (1.6 m)   Wt 144 lb 1.6 oz (65.4 kg)   BMI 25.53 kg/m²     Weight:    Wt Readings from Last 3 Encounters:   11/04/20 144 lb 1.6 oz (65.4 kg)   10/27/20 144 lb (65.3 kg)   10/16/20 149 lb 7.6 oz (67.8 kg)       KPS: 90% Able to carry on normal activity; minor signs or symptoms of disease    ECOG PS:  (1) Restricted in physically strenuous activity, ambulatory and able to do work of light nature    General: Awake, alert and oriented. HEENT: normocephalic, alopecia, PERRL, no scleral erythema or icterus, Oral mucosa moist and intact, throat clear  NECK: supple without palpable adenopathy  BACK: Straight negative CVAT  SKIN: warm dry and intact without lesions rashes or masses  CHEST: CTA bilaterally without use of accessory muscles  CV: Normal S1 S2, RRR, no MRG  ABD: NT ND normoactive BS, no palpable masses or hepatosplenomegaly  EXTREMITIES: without edema, denies calf tenderness  NEURO: CN II - XII grossly intact  CATHETER: RIJ Trifusion (IR, 10/27/20) - CDI    Laboratory Data:  CBC:   No results for input(s): WBC, HGB, HCT, MCV, PLT in the last 72 hours. BMP/Mag:  No results for input(s): NA, K, CL, CO2, PHOS, BUN, CREATININE, MG in the last 72 hours. Invalid input(s): CA  LIVP:   No results for input(s): AST, ALT, LIPASE, BILIDIR, BILITOT, ALKPHOS in the last 72 hours. Invalid input(s): AMYLASE,  ALB  Coags: No results for input(s): PROTIME, INR, APTT in the last 72 hours. Uric Acid No results for input(s): LABURIC in the last 72 hours. PROBLEM LIST:            1. IgG Lambda smoldering multiple myeloma  2. JULIETH  3. OA  4. HTN  5. HLD  6. Insomnia  7. Osteopenia  8. Vit D deficiency. TREATMENT:            1. RVD x4 cycles (June-Sept 2020)  2. HD Melphalan 200mg/m2 f/b autologous PBSC infusion 11/6/20    ASSESSMENT AND PLAN:            1. IgG Lambda Smoldering Myeloma: OK  - Admitted for autologous SCT 11/6/20    - Post-Txp Maintenance: USOR trial - estephania/kika vs kika maintenance    Dr. Xiao Amin has discussed the risks associated with transplant which include the risk of infection, bleeding and inability to obtain a long term remission. He understands these risks and is willing to proceed. The consent is signed and on the chart. The patient was counseled at length about the risks of boaz Covid-19 during their peritransplant period and any recovery window from transplant.   The patient was made aware that boaz Covid-19  may worsen their prognosis for recovering from their transplant and lend to a higher morbidity and/or mortality risk. All material risks, benefits, and reasonable alternatives including postponing the transplant were discussed. The patient does wish to proceed with the transplant at this time. Day - 2    2. ID:  Afebrile, no evidence of infection.    - Begin Valtrex, cont acyclovir at discharge for VZV positive titer.    - Start Diflucan ppx on 11/6/20  - Start Levaquin ppx when [address] < 1.5    3. Heme: No active issues  - Transfuse for Hgb < 7 and Platelets < 87I  - No transfusion today    4. Metabolic: Electrolytes and renal fxn stable. - Start IVFs: 50 ml /hr   - Replace potassium and magnesium per PRN orders    5. Cardiac:   - H/o HTN & HLD  - Cont amlodipine 5mg, lisinopril-HCTZ 10-12.5mg  - Hold statin marj-transplant     6. GI / Nutrition:  Appetite and oral intake is good. - Start low microbial diet   - Follow closely with dietary    7. Insomnia:  - Cont melatonin PRN      - DVT Prophylaxis: Platelets >88,336 cells/dL, - daily lovenox prophylaxis ordered  Contraindications to pharmacologic prophylaxis: None  Contraindications to mechanical prophylaxis: None    - Disposition:  Once ANC >1 and recovered from toxicities of transplant    The patient was seen and examined by Dr. Joe Lane. This admission history and physical has been discussed and agreed upon by Dr. Joe Lane. Edward A. Sharyle Solders, DO, MS  Oncology/Hematology Care    Please contact via:  1. Perfect Serve  2.   Cell Phone:  [phone]    11/4/2020   10:56 AM

## 2020-11-04 NOTE — PROGRESS NOTES
4 Eyes Admission Assessment     I agree as the admission nurse that 2 RN's have performed a thorough Head to Toe Skin Assessment on the patient. ALL assessment sites listed below have been assessed on admission. Areas assessed by both nurses: Tosha GILLESPIE  [x]   Head, Face, and Ears   [x]   Shoulders, Back, and Chest  [x]   Arms, Elbows, and Hands   [x]   Coccyx, Sacrum, and Ischium  [x]   Legs, Feet, and Heels        Does the Patient have Skin Breakdown?   No         Tristian Prevention initiated:  Yes   Wound Care Orders initiated:  No      [facility] nurse consulted for Pressure Injury (Stage 3,4, Unstageable, DTI, NWPT, and Complex wounds) or Tristian score 18 or lower:  No      Nurse 1 eSignature: Electronically signed by Josselin Thomas RN on 11/4/20 at 11:34 AM EST    **SHARE this note so that the co-signing nurse is able to place an eSignature**    Nurse 2 eSignature: Electronically signed by Hoda Warren RN on 11/4/20 at 11:39 AM EST

## 2020-11-04 NOTE — FLOWSHEET NOTE
11/04/20 1248   Encounter Summary   Services provided to: Patient   Referral/Consult From: Nursing Supervisor/Manager   Continue Visiting   (es 11/4)   Complexity of Encounter Moderate   Length of Encounter 30 minutes   Routine   Type Initial   Assessment Approachable   Intervention Active listening;Explored feelings, thoughts, concerns;Explored coping resources;Nurtured hope;Discussed meaning/purpose;Discussed illness/injury and it's impact   Outcome Engaged in conversation;Receptive

## 2020-11-04 NOTE — PROGRESS NOTES
Original chemotherapy orders reviewed and acknowledged. Appropriateness of chemotherapy treatment regimen Melphalan for diagnosis of multiple myeloma was verified. Patient educated on chemotherapy regimen. Acknowledgement of informed consent for chemotherapy obtained. Estimated body surface area is 1.71 meters squared as calculated from the following:    Height as of this encounter: 5' 3\" (1.6 m). Weight as of this encounter: 144 lb 1.6 oz (65.4 kg). verified. Appropriate dosing calculations of chemotherapy based on above height, weight, and BSA verified. Administration: Chemotherapy drug melphalan independently verified with Nohemi Denver RN prior to administration. Acknowledgement of informed consent for chemotherapy administration verified. Original order, appropriateness of regimen, drug supplied, height, weight, BSA, dose calculations, expiration dates/times, drug appearance, and two patient identifiers were verified by both RNs. Drug checked for vesicant/irritant status and for risk of hypersensitivity. Most recent laboratory values and allergies, were reviewed. Positive, brisk blood return via CVC was confirmed prior to administration. Chest x-ray for correct line placement reviewed. Angelique Katz and Nohemi Denver RN verified correct rate of chemotherapy and maintenance IV fluids. Patient was educated on chemotherapy regimen prior to administration including indication for treatment related to disease & side effects of chemotherapy drug. Patient verbalizes understanding of all instructions. Completion of Chemotherapy: Monitoring during infusion done per policy, see Flowsheets. Blood return verified before, during, and after infusion per policy; no signs of extravasation. Pt tolerated chemotherapy well and without incident. Chemotherapy infusion end time on the [facility] Will continue to monitor.

## 2020-11-05 LAB
ANION GAP SERPL CALCULATED.3IONS-SCNC: 7 MMOL/L (ref 3–16)
APTT: 35 SEC (ref 24.2–36.2)
BASOPHILS ABSOLUTE: 0 K/UL (ref 0–0.2)
BASOPHILS RELATIVE PERCENT: 0.2 %
BUN BLDV-MCNC: 9 MG/DL (ref 7–20)
CALCIUM SERPL-MCNC: 8.3 MG/DL (ref 8.3–10.6)
CHLORIDE BLD-SCNC: 105 MMOL/L (ref 99–110)
CO2: 23 MMOL/L (ref 21–32)
CREAT SERPL-MCNC: <0.5 MG/DL (ref 0.6–1.2)
EOSINOPHILS ABSOLUTE: 0 K/UL (ref 0–0.6)
EOSINOPHILS RELATIVE PERCENT: 0.1 %
GFR AFRICAN AMERICAN: >60
GFR NON-AFRICAN AMERICAN: >60
GLUCOSE BLD-MCNC: 162 MG/DL (ref 70–99)
HCT VFR BLD CALC: 33.6 % (ref 36–48)
HEMOGLOBIN: 11.4 G/DL (ref 12–16)
INR BLD: 0.94 (ref 0.86–1.14)
LYMPHOCYTES ABSOLUTE: 0.3 K/UL (ref 1–5.1)
LYMPHOCYTES RELATIVE PERCENT: 7.2 %
MAGNESIUM: 2.4 MG/DL (ref 1.8–2.4)
MCH RBC QN AUTO: 30.5 PG (ref 26–34)
MCHC RBC AUTO-ENTMCNC: 33.9 G/DL (ref 31–36)
MCV RBC AUTO: 90 FL (ref 80–100)
MONOCYTES ABSOLUTE: 0 K/UL (ref 0–1.3)
MONOCYTES RELATIVE PERCENT: 1 %
NEUTROPHILS ABSOLUTE: 3.9 K/UL (ref 1.7–7.7)
NEUTROPHILS RELATIVE PERCENT: 91.5 %
PDW BLD-RTO: 13.6 % (ref 12.4–15.4)
PLATELET # BLD: 205 K/UL (ref 135–450)
PMV BLD AUTO: 7.7 FL (ref 5–10.5)
POTASSIUM SERPL-SCNC: 4.4 MMOL/L (ref 3.5–5.1)
PROTHROMBIN TIME: 10.9 SEC (ref 10–13.2)
RBC # BLD: 3.73 M/UL (ref 4–5.2)
SODIUM BLD-SCNC: 135 MMOL/L (ref 136–145)
URIC ACID, SERUM: 3.7 MG/DL (ref 2.6–6)
WBC # BLD: 4.3 K/UL (ref 4–11)

## 2020-11-05 PROCEDURE — 85025 COMPLETE CBC W/AUTO DIFF WBC: CPT

## 2020-11-05 PROCEDURE — 2060000000 HC ICU INTERMEDIATE R&B

## 2020-11-05 PROCEDURE — 3E04305 INTRODUCTION OF OTHER ANTINEOPLASTIC INTO CENTRAL VEIN, PERCUTANEOUS APPROACH: ICD-10-PCS | Performed by: INTERNAL MEDICINE

## 2020-11-05 PROCEDURE — 84550 ASSAY OF BLOOD/URIC ACID: CPT

## 2020-11-05 PROCEDURE — 85610 PROTHROMBIN TIME: CPT

## 2020-11-05 PROCEDURE — 83735 ASSAY OF MAGNESIUM: CPT

## 2020-11-05 PROCEDURE — 80048 BASIC METABOLIC PNL TOTAL CA: CPT

## 2020-11-05 PROCEDURE — 36592 COLLECT BLOOD FROM PICC: CPT

## 2020-11-05 PROCEDURE — 6360000002 HC RX W HCPCS: Performed by: INTERNAL MEDICINE

## 2020-11-05 PROCEDURE — 96417 CHEMO IV INFUS EACH ADDL SEQ: CPT

## 2020-11-05 PROCEDURE — 6370000000 HC RX 637 (ALT 250 FOR IP): Performed by: NURSE PRACTITIONER

## 2020-11-05 PROCEDURE — 6370000000 HC RX 637 (ALT 250 FOR IP): Performed by: INTERNAL MEDICINE

## 2020-11-05 PROCEDURE — 2580000003 HC RX 258: Performed by: NURSE PRACTITIONER

## 2020-11-05 PROCEDURE — 85730 THROMBOPLASTIN TIME PARTIAL: CPT

## 2020-11-05 PROCEDURE — 2580000003 HC RX 258: Performed by: INTERNAL MEDICINE

## 2020-11-05 PROCEDURE — 6360000002 HC RX W HCPCS: Performed by: NURSE PRACTITIONER

## 2020-11-05 RX ORDER — LANOLIN ALCOHOL/MO/W.PET/CERES
3 CREAM (GRAM) TOPICAL NIGHTLY PRN
Status: DISCONTINUED | OUTPATIENT
Start: 2020-11-05 | End: 2020-11-19 | Stop reason: HOSPADM

## 2020-11-05 RX ADMIN — SODIUM CHLORIDE 15 ML: 900 IRRIGANT IRRIGATION at 09:27

## 2020-11-05 RX ADMIN — AMLODIPINE BESYLATE 5 MG: 5 TABLET ORAL at 09:26

## 2020-11-05 RX ADMIN — DOXEPIN HYDROCHLORIDE 10 MG: 10 CAPSULE ORAL at 20:27

## 2020-11-05 RX ADMIN — LISINOPRIL AND HYDROCHLOROTHIAZIDE 1 TABLET: 12.5; 1 TABLET ORAL at 09:25

## 2020-11-05 RX ADMIN — THERA TABS 1 TABLET: TAB at 09:26

## 2020-11-05 RX ADMIN — ENOXAPARIN SODIUM 40 MG: 40 INJECTION SUBCUTANEOUS at 18:12

## 2020-11-05 RX ADMIN — DEXAMETHASONE 12 MG: 4 TABLET ORAL at 09:25

## 2020-11-05 RX ADMIN — SODIUM CHLORIDE 15 ML: 900 IRRIGANT IRRIGATION at 10:07

## 2020-11-05 RX ADMIN — Medication 10 ML: at 09:26

## 2020-11-05 RX ADMIN — SODIUM CHLORIDE 15 ML: 900 IRRIGANT IRRIGATION at 20:28

## 2020-11-05 RX ADMIN — Medication 10 ML: at 20:27

## 2020-11-05 RX ADMIN — VALACYCLOVIR HYDROCHLORIDE 500 MG: 500 TABLET, FILM COATED ORAL at 20:27

## 2020-11-05 RX ADMIN — MELPHALAN HYDROCHLORIDE 150 MG: KIT INTRAVENOUS at 10:46

## 2020-11-05 RX ADMIN — ONDANSETRON HYDROCHLORIDE 24 MG: 8 TABLET, FILM COATED ORAL at 09:25

## 2020-11-05 RX ADMIN — VALACYCLOVIR HYDROCHLORIDE 500 MG: 500 TABLET, FILM COATED ORAL at 09:26

## 2020-11-05 RX ADMIN — DEXTROSE AND SODIUM CHLORIDE: 5; 450 INJECTION, SOLUTION INTRAVENOUS at 09:25

## 2020-11-05 ASSESSMENT — PAIN SCALES - GENERAL
PAINLEVEL_OUTOF10: 0

## 2020-11-05 NOTE — PROGRESS NOTES
[address] [facility]  Autologous Progress Note    2020    Freya Burnett    :  1955    MRN:  [id number]    Referring MD: Olamide Knowles MD  [address]  [facility], [address] [place] [address]      Subjective:  Doing well - no major complaints     ECOG PS:  (1) Restricted in physically strenuous activity, ambulatory and able to do work of light nature    KPS: 90% Able to carry on normal activity; minor signs or symptoms of disease    Isolation:  None     Medications    Scheduled Meds:   enoxaparin  40 mg Subcutaneous Daily    [START ON 2020] fluconazole  400 mg Oral Daily    Saline Mouthwash  15 mL Swish & Spit 4x Daily AC & HS    sodium chloride flush  10 mL Intravenous 2 times per day    valACYclovir  500 mg Oral BID    furosemide  40 mg Intravenous Q12H    ondansetron  24 mg Oral Once    dexamethasone  12 mg Oral Once    melphalan  150 mg Intravenous Once    amLODIPine  5 mg Oral Daily    doxepin  10 mg Oral Nightly    lisinopril-hydroCHLOROthiazide  1 tablet Oral Daily    multivitamin  1 tablet Oral Daily     Continuous Infusions:   sodium chloride      dextrose 5 % and 0.45 % NaCl 50 mL/hr at 20 1439     PRN Meds:.sodium chloride, alteplase, magnesium hydroxide, magnesium sulfate, potassium chloride, Saline Mouthwash, prochlorperazine **OR** prochlorperazine, LORazepam **OR** LORazepam    ROS:  As noted above, otherwise remainder of 10-point ROS negative    Physical Exam:     I&O:      Intake/Output Summary (Last 24 hours) at 2020 0819  Last data filed at 2020 0341  Gross per 24 hour   Intake 3916 ml   Output 4400 ml   Net -484 ml       Vital Signs:  /62 Comment: sleeping  Pulse 83   Temp 97.6 °F (36.4 °C) (Oral)   Resp 16   Ht 5' 3\" (1.6 m)   Wt 144 lb 1.6 oz (65.4 kg)   SpO2 98%   BMI 25.53 kg/m²     Weight:    Wt Readings from Last 3 Encounters:   20 144 lb 1.6 oz (65.4 kg)   10/27/20 144 lb (65.3 kg)   10/16/20 149 lb 7.6 oz (67.8 kg) today     4. Metabolic: Electrolytes and renal fxn stable except hypoNa and steroid induced hyperglycemia   - Cont IVF: D5.45NS @ 50 ml /hr   - Replace potassium and magnesium per PRN orders     5. Cardiac:   - H/o HTN & HLD  HTN:  - Cont amlodipine 5 mg & lisinopril-HCTZ 10-12.5mg  HLD:    - Hold statin marj-transplant      6. GI / Nutrition:    Nutrition:  Appetite and oral intake is good. - Cont MVI  - Cont low microbial diet   - Follow closely with dietary     7. Psych: Insomnia:  - Cont melatonin PRN       - DVT Prophylaxis: Platelets >20,328 cells/dL, - daily lovenox prophylaxis ordered  Contraindications to pharmacologic prophylaxis: None  Contraindications to mechanical prophylaxis: None    - Disposition:  Once ANC >1 and recovered from toxicities of transplant    The patient was seen and examined by Dr. Conor Truong, [address] [place] [address]. Sal Johnson DO, MS  Oncology/Hematology Care    Please contact via:  1. Perfect Serve  2.   Cell Phone:  [phone]    11/5/2020   10:39 AM

## 2020-11-05 NOTE — ONCOLOGY
Administration: Chemotherapy drug melphalan independently verified with Reva Denver RN prior to administration. Acknowledgement of informed consent for chemotherapy administration verified. Original order, appropriateness of regimen, drug supplied, height, weight, BSA, dose calculations, expiration dates/times, drug appearance, and two patient identifiers were verified by both RNs. Drug checked for vesicant/irritant status and for risk of hypersensitivity. Most recent laboratory values and allergies, were reviewed. Positive, brisk blood return via CVC was confirmed prior to administration. Chest x-ray for correct line placement reviewed. Fei Corona and ELVIA Oneill/MARIYA Hernandez RN verified correct rate of chemotherapy and maintenance IV fluids. Patient was educated on chemotherapy regimen prior to administration including indication for treatment related to disease & side effects of chemotherapy drug. Patient verbalizes understanding of all instructions. Completion of Chemotherapy: Monitoring during infusion done per policy, see Flowsheets. Blood return verified before, during, and after infusion per policy; no signs of extravasation. Pt tolerated chemotherapy well and without incident. Chemotherapy infusion end time on the [facility] Will continue to monitor. First bag of melphalan was hung per policy and orders. This RN noticed while bag was hanging that the medication was crystallizing in the tube. This RN stopped medication infusion. Chemo Pharmacist notified. Chemo pharmacist came to the bedside, it was determined this medication should be taken down. Pharmacist made another dose of melphalan for infusion today. This infusion is hanging. This RN is at the bedside monitoring per policy. RN will continue to monitor. Patient received melphalan today per chemotherapy preparative regimen as ordered.   Prior to initiating melphalan infusion, patient was educated on the use of cryotherapy to prevent/decrease severe oral mucositis. Patient verbalized understanding of procedure and performed cryotherapy as instructed for 15 minutes prior to infusion, throughout duration of infusion, and for 2 hours post infusion using ice. Pt tolerated well with no significant side effects.

## 2020-11-05 NOTE — PLAN OF CARE
Problem: Falls - Risk of:  Goal: Will remain free from falls  Description: Will remain free from falls  Outcome: Ongoing   Orthostatic vital signs obtained at start of shift - see flowsheet for details. Pt does not meet criteria for orthostasis. Pt is a Med fall risk. See [place] Nice Fall Score and ABCDS Injury Risk assessments. - Screening for Orthostasis AND not a Red Level Risk per COPPOLA/ABCDS: Pt bed is in low position, side rails up, call light and belongings are in reach. Fall risk light is on outside pts room. Pt encouraged to call for assistance as needed. Will continue with hourly rounds for PO intake, pain needs, toileting and repositioning as needed. Problem: Bleeding:  Goal: Will show no signs and symptoms of excessive bleeding  Description: Will show no signs and symptoms of excessive bleeding  Outcome: Ongoing    Patient's hemoglobin this AM:   Recent Labs     11/05/20  0340   HGB 11.4*     Patient's platelet count this AM:   Recent Labs     11/05/20  0340       Thrombocytopenia Precautions in place. Patient showing no signs or symptoms of active bleeding. Transfusion not indicated at this time. Patient verbalizes understanding of all instructions. Will continue to assess and implement POC. Call light within reach and hourly rounding in place. Problem: PROTECTIVE PRECAUTIONS  Goal: Patient will remain free of nosocomial Infections  Outcome: Ongoing    Pt afebrile. RN will monitor. Problem: Activity:  Goal: Ability to tolerate increased activity will improve  Description: Ability to tolerate increased activity will improve  Outcome: Ongoing    Stable/No Isolation Precautions:  Pt with activity orders for up ad salome. Encouraged pt to be up OOB as much as possible throughout the day and for all meals. Encouraged frequent short naps as necessary to preserve energy but instructed that while awake, pt should be OOB. Encouraged pt to ambulate in halls.   Pt seen up ad salome in halls twice this shift. Pt is visualized to be OOB % of the time this shift. Will continue to encourage frequent activity. Problem: Infection - Central Venous Catheter-Associated Bloodstream Infection:  Goal: Will show no infection signs and symptoms  Description: Will show no infection signs and symptoms  Outcome: Ongoing    CVC site remains free of signs/symptoms of infection. No drainage, edema, erythema, pain, or warmth noted at site. Dressing changes continue per protocol and on an as needed basis - see flowsheet. Compliant with [facility] Bath Protocol:  Performed CHG bath today per [facility] protocol utilizing CHG solution in the shower. CVC site cleansed with CHG wipe over dressing, skin surrounding dressing, and first 6\" of IV tubing. Pt tolerated well. Continued to encourage daily CHG bathing per [facility] protocol.

## 2020-11-06 LAB
ALBUMIN SERPL-MCNC: 4.1 G/DL (ref 3.4–5)
ALP BLD-CCNC: 70 U/L (ref 40–129)
ALT SERPL-CCNC: 15 U/L (ref 10–40)
ANION GAP SERPL CALCULATED.3IONS-SCNC: 8 MMOL/L (ref 3–16)
AST SERPL-CCNC: 13 U/L (ref 15–37)
BASOPHILS ABSOLUTE: 0 K/UL (ref 0–0.2)
BASOPHILS RELATIVE PERCENT: 0.1 %
BILIRUB SERPL-MCNC: 0.6 MG/DL (ref 0–1)
BILIRUBIN DIRECT: <0.2 MG/DL (ref 0–0.3)
BILIRUBIN, INDIRECT: ABNORMAL MG/DL (ref 0–1)
BUN BLDV-MCNC: 14 MG/DL (ref 7–20)
CALCIUM SERPL-MCNC: 8.5 MG/DL (ref 8.3–10.6)
CHLORIDE BLD-SCNC: 103 MMOL/L (ref 99–110)
CO2: 24 MMOL/L (ref 21–32)
CREAT SERPL-MCNC: <0.5 MG/DL (ref 0.6–1.2)
EOSINOPHILS ABSOLUTE: 0 K/UL (ref 0–0.6)
EOSINOPHILS RELATIVE PERCENT: 0.1 %
GFR AFRICAN AMERICAN: >60
GFR NON-AFRICAN AMERICAN: >60
GLUCOSE BLD-MCNC: 137 MG/DL (ref 70–99)
HCT VFR BLD CALC: 31 % (ref 36–48)
HEMOGLOBIN: 10.6 G/DL (ref 12–16)
LACTATE DEHYDROGENASE: 178 U/L (ref 100–190)
LYMPHOCYTES ABSOLUTE: 0.2 K/UL (ref 1–5.1)
LYMPHOCYTES RELATIVE PERCENT: 4.1 %
MCH RBC QN AUTO: 30.5 PG (ref 26–34)
MCHC RBC AUTO-ENTMCNC: 34.3 G/DL (ref 31–36)
MCV RBC AUTO: 88.8 FL (ref 80–100)
MONOCYTES ABSOLUTE: 0.6 K/UL (ref 0–1.3)
MONOCYTES RELATIVE PERCENT: 11.3 %
NEUTROPHILS ABSOLUTE: 4.5 K/UL (ref 1.7–7.7)
NEUTROPHILS RELATIVE PERCENT: 84.4 %
PDW BLD-RTO: 14.1 % (ref 12.4–15.4)
PHOSPHORUS: 3.5 MG/DL (ref 2.5–4.9)
PLATELET # BLD: 237 K/UL (ref 135–450)
PMV BLD AUTO: 7.5 FL (ref 5–10.5)
POTASSIUM SERPL-SCNC: 4.1 MMOL/L (ref 3.5–5.1)
RBC # BLD: 3.49 M/UL (ref 4–5.2)
SODIUM BLD-SCNC: 135 MMOL/L (ref 136–145)
TOTAL PROTEIN: 6.1 G/DL (ref 6.4–8.2)
URIC ACID, SERUM: 4.1 MG/DL (ref 2.6–6)
WBC # BLD: 5.4 K/UL (ref 4–11)

## 2020-11-06 PROCEDURE — 38208 THAW PRESERVED STEM CELLS: CPT

## 2020-11-06 PROCEDURE — 85025 COMPLETE CBC W/AUTO DIFF WBC: CPT

## 2020-11-06 PROCEDURE — 6370000000 HC RX 637 (ALT 250 FOR IP): Performed by: NURSE PRACTITIONER

## 2020-11-06 PROCEDURE — 2580000003 HC RX 258: Performed by: NURSE PRACTITIONER

## 2020-11-06 PROCEDURE — 6360000002 HC RX W HCPCS: Performed by: NURSE PRACTITIONER

## 2020-11-06 PROCEDURE — 38241 TRANSPLT AUTOL HCT/DONOR: CPT

## 2020-11-06 PROCEDURE — 36592 COLLECT BLOOD FROM PICC: CPT

## 2020-11-06 PROCEDURE — 30243Y0 TRANSFUSION OF AUTOLOGOUS HEMATOPOIETIC STEM CELLS INTO CENTRAL VEIN, PERCUTANEOUS APPROACH: ICD-10-PCS | Performed by: INTERNAL MEDICINE

## 2020-11-06 PROCEDURE — 80048 BASIC METABOLIC PNL TOTAL CA: CPT

## 2020-11-06 PROCEDURE — 80076 HEPATIC FUNCTION PANEL: CPT

## 2020-11-06 PROCEDURE — 83615 LACTATE (LD) (LDH) ENZYME: CPT

## 2020-11-06 PROCEDURE — 84100 ASSAY OF PHOSPHORUS: CPT

## 2020-11-06 PROCEDURE — 84550 ASSAY OF BLOOD/URIC ACID: CPT

## 2020-11-06 PROCEDURE — 2060000000 HC ICU INTERMEDIATE R&B

## 2020-11-06 RX ORDER — MORPHINE SULFATE 2 MG/ML
2 INJECTION, SOLUTION INTRAMUSCULAR; INTRAVENOUS PRN
Status: DISCONTINUED | OUTPATIENT
Start: 2020-11-06 | End: 2020-11-19 | Stop reason: HOSPADM

## 2020-11-06 RX ORDER — DIPHENHYDRAMINE HCL 25 MG
25 TABLET ORAL ONCE
Status: COMPLETED | OUTPATIENT
Start: 2020-11-06 | End: 2020-11-06

## 2020-11-06 RX ORDER — ACETAMINOPHEN 325 MG/1
650 TABLET ORAL ONCE
Status: COMPLETED | OUTPATIENT
Start: 2020-11-06 | End: 2020-11-06

## 2020-11-06 RX ORDER — DIPHENHYDRAMINE HYDROCHLORIDE 50 MG/ML
25 INJECTION INTRAMUSCULAR; INTRAVENOUS PRN
Status: DISPENSED | OUTPATIENT
Start: 2020-11-06 | End: 2020-11-07

## 2020-11-06 RX ORDER — EPINEPHRINE 1 MG/ML
0.3 INJECTION, SOLUTION, CONCENTRATE INTRAVENOUS
Status: ACTIVE | OUTPATIENT
Start: 2020-11-06 | End: 2020-11-06

## 2020-11-06 RX ORDER — SODIUM CHLORIDE 9 MG/ML
INJECTION, SOLUTION INTRAVENOUS CONTINUOUS PRN
Status: DISCONTINUED | OUTPATIENT
Start: 2020-11-06 | End: 2020-11-19 | Stop reason: HOSPADM

## 2020-11-06 RX ADMIN — ACETAMINOPHEN 650 MG: 325 TABLET ORAL at 10:24

## 2020-11-06 RX ADMIN — Medication 10 ML: at 08:09

## 2020-11-06 RX ADMIN — LISINOPRIL AND HYDROCHLOROTHIAZIDE 1 TABLET: 12.5; 1 TABLET ORAL at 08:09

## 2020-11-06 RX ADMIN — DEXTROSE AND SODIUM CHLORIDE: 5; 450 INJECTION, SOLUTION INTRAVENOUS at 10:11

## 2020-11-06 RX ADMIN — SODIUM CHLORIDE 15 ML: 900 IRRIGANT IRRIGATION at 15:49

## 2020-11-06 RX ADMIN — DEXTROSE AND SODIUM CHLORIDE: 5; 450 INJECTION, SOLUTION INTRAVENOUS at 05:03

## 2020-11-06 RX ADMIN — ENOXAPARIN SODIUM 40 MG: 40 INJECTION SUBCUTANEOUS at 18:05

## 2020-11-06 RX ADMIN — VALACYCLOVIR HYDROCHLORIDE 500 MG: 500 TABLET, FILM COATED ORAL at 08:09

## 2020-11-06 RX ADMIN — VALACYCLOVIR HYDROCHLORIDE 500 MG: 500 TABLET, FILM COATED ORAL at 20:07

## 2020-11-06 RX ADMIN — SODIUM CHLORIDE 15 ML: 900 IRRIGANT IRRIGATION at 10:26

## 2020-11-06 RX ADMIN — AMLODIPINE BESYLATE 5 MG: 5 TABLET ORAL at 08:09

## 2020-11-06 RX ADMIN — FLUCONAZOLE 400 MG: 200 TABLET ORAL at 08:09

## 2020-11-06 RX ADMIN — DEXTROSE AND SODIUM CHLORIDE: 5; 450 INJECTION, SOLUTION INTRAVENOUS at 15:49

## 2020-11-06 RX ADMIN — HYDROCORTISONE SODIUM SUCCINATE 100 MG: 100 INJECTION, POWDER, FOR SOLUTION INTRAMUSCULAR; INTRAVENOUS at 10:24

## 2020-11-06 RX ADMIN — MAGNESIUM HYDROXIDE 10 ML: 400 SUSPENSION ORAL at 20:11

## 2020-11-06 RX ADMIN — SODIUM CHLORIDE 15 ML: 900 IRRIGANT IRRIGATION at 05:55

## 2020-11-06 RX ADMIN — DIPHENHYDRAMINE HYDROCHLORIDE 25 MG: 25 TABLET ORAL at 10:24

## 2020-11-06 RX ADMIN — DOXEPIN HYDROCHLORIDE 10 MG: 10 CAPSULE ORAL at 20:07

## 2020-11-06 RX ADMIN — THERA TABS 1 TABLET: TAB at 08:09

## 2020-11-06 ASSESSMENT — PAIN SCALES - GENERAL
PAINLEVEL_OUTOF10: 0

## 2020-11-06 NOTE — CARE COORDINATION
Case Management Assessment           Initial Evaluation                Date / Time of Evaluation: 11/6/2020 11:43 AM                 Assessment Completed by: Aliyah Lui    Patient Name: Gustavo Kilgore     YOB: 1955  Diagnosis: Multiple myeloma ([facility] [address] [C90.00]  Multiple myeloma in remission [facility]) [C90.01]     Date / Time: 11/4/2020  9:15 AM    Patient Admission Status: Inpatient    If patient is discharged prior to next notation, then this note serves as note for discharge by case management. Current PCP: Xiao Durbin MD  Clinic Patient: No    Chart Reviewed: Yes  Patient/ Family Interviewed: Yes    Initial assessment completed at bedside with: patient    Hospitalization in the last 30 days: Yes    Emergency Contacts:  Extended Emergency Contact Information  Primary Emergency Contact: irina lunsford  Home Phone: [phone]  Relation: Brother/Sister    Advance Directives:   Code Status: [address]: Yes    Agent: reports that it is her sister sandhya, but number is not on file      Financial  Payor: MEDICARE / Plan: MEDICARE PART A AND B / Product Type: *No Product type* /     Pre-cert required for SNF: Yes    [address]  Phone: [phone] Fax: [address] [phone] [facility] [phone]  [address] [place] [address]  Phone: [phone] Fax: [phone]      Potential assistance Purchasing Medications: Potential Assistance Purchasing Medications: No  Does Patient want to participate in local refill/ meds to beds program?: Not Assessed    Meds To Beds General Rules:  1. Can ONLY be done Monday- Friday between 8:30am-5pm  2. Prescription(s) must be in pharmacy by 3pm to be filled same day  3. Copy of patient's insurance/ prescription drug card and patient face sheet must be sent along with the prescription(s)  4. Cost of Rx cannot be added to hospital bill. If financial assistance is needed, please contact unit  or ;  or  CANNOT provide pharmacy voucher for patients co-pays  5. Patients can then  the prescription on their way out of the hospital at discharge, or pharmacy can deliver to the bedside if staff is available. (payment due at time of pick-up or delivery - cash, check, or card accepted)     Able to afford home medications/ co-pay costs: Yes    ADLS  Support Systems: Family Members, Friends/Neighbors    PT AM-PAC:   /24  OT AM-PAC:   /24    [place]: condo  Steps: yes    Plans to RETURN to current housing: Yes  Barriers to RETURNING to current housing: medical clearance    Home Care Information  Currently ACTIVE with [address]: No  Home Care Agency: Not Applicable    Currently ACTIVE with [facility]: No  Passport/ Waiver: No  Passport/ Waiver Services: Not Applicable      Durable Medical Equipment  DME Provider: none  Equipment: none    Home Oxygen and [address] prior to admission: No  [address]: Not Applicable  Other Respiratory Equipment: none    Informed of need to bring portable home O2 tank on day of DISCHARGE for nursing to connect prior to leaving: No  Verbalized agreement/Understanding: No  Person to bring portable tank at discharge: none    Dialysis  Active with HD/PD prior to admission: No  Nephrologist: none    HD Center:  Not Applicable    DISCHARGE PLAN:  Disposition: Home- No Services Needed    Transportation PLAN for discharge: family     Factors facilitating achievement of predicted outcomes: Family support, Motivated and Cooperative    Barriers to discharge: Medical complications    Additional Case Management Notes: Patient is day 0 from auto transplant.  She will discharge home with no needs in the next couple of weeks. Her friend is coming to town to act as her 24-7 caregiver. The Plan for Transition of Care is related to the following treatment goals of Multiple myeloma ([facility] [address] [C90.00]  Multiple myeloma in remission ([facility] [address] [C90.01]    The Patient and/or patient representative Gwen Maciel and her family were provided with a choice of provider and agrees with the discharge plan Yes    Freedom of choice list was provided with basic dialogue that supports the patient's individualized plan of care/goals and shares the quality data associated with the providers.  Not Indicated    Care Transition patient: No    Jenniferlena Barnett, [address]   Case Management Department  Ph: [phone]   Fax: [phone]

## 2020-11-06 NOTE — PROGRESS NOTES
icterus, Oral mucosa moist and intact, throat clear  NECK: supple without palpable adenopathy  BACK: Straight negative CVAT  SKIN: warm dry and intact without lesions rashes or masses  CHEST: CTA bilaterally without use of accessory muscles  CV: Normal S1 S2, RRR, no MRG  ABD: NT ND normoactive BS, no palpable masses or hepatosplenomegaly  EXTREMITIES: without edema, denies calf tenderness  NEURO: CN II - XII grossly intact  CATHETER: Right IJ Trifusion (IR, 10/27/20) - CDI    Data:   CBC:   Recent Labs     11/04/20  1000 11/05/20  0340 11/06/20  0325   WBC 5.1 4.3 5.4   HGB 12.7 11.4* 10.6*   HCT 36.8 33.6* 31.0*   MCV 89.1 90.0 88.8    205 237     BMP/Mag:  Recent Labs     11/04/20  1000 11/04/20  1315 11/05/20  0340 11/06/20  0325    136 135* 135*   K 5.8* 3.3* 4.4 4.1    100 105 103   CO2 22 25 23 24   PHOS 3.5 2.6  --  3.5   BUN 11 9 9 14   CREATININE <0.5* <0.5* <0.5* <0.5*   MG 2.20  --  2.40  --      LIVP:   Recent Labs     11/04/20  1000 11/06/20  0325   AST 22 13*   ALT 19 15   BILIDIR <0.2 <0.2   BILITOT 0.8 0.6   ALKPHOS 96 70     Uric Acid:    Recent Labs     11/06/20  0325   LABURIC 4.1     Coags:   Recent Labs     11/04/20  1000 11/05/20  0340   PROTIME 11.1 10.9   INR 0.96 0.94   APTT 34.4 35.0       PROBLEM LIST:            1. IgG Lambda smoldering multiple myeloma  2. JULIETH  3. OA  4. HTN  5. HLD  6. Insomnia  7. Osteopenia  8. Vit D deficiency.      TREATMENT:            1. RVD x 4 cycles (6/2020 - 9/2020)  2. HD Melphalan 200mg/m2 & ASCT (11/6/20) - 2.94 x10^6 [id number]/kg     ASSESSMENT AND PLAN:            1. IgG Lambda Smoldering Myeloma: VT  - S/p [id number] & ASCT (11/6/20)   - Post-Txp Maintenance: USOR trial - estephania/kika vs kika maintenance     Day 0     2. ID:  Afebrile, no evidence of infection.    - Cont Diflucan & Valtrex, cont acyclovir at discharge for VZV positive titer.    - Start Levaquin ppx when ANC < 1.5     3.  Heme: anemia from chemotherapy   - Transfuse for Hgb < 7

## 2020-11-06 NOTE — PLAN OF CARE
Problem: Falls - Risk of:  Goal: Will remain free from falls  Description: Will remain free from falls  11/6/2020 0034 by Luis Antonio Okeefe RN  Outcome: Ongoing  Note: Orthostatic vital signs obtained at start of shift - see flowsheet for details. Pt does not meet criteria for orthostasis. Pt is a Med fall risk. See Saldivar Mort Fall Score and ABCDS Injury Risk assessments. Screening for Orthostasis AND not a [place] Risk per COPPOLA/ABCDS: Pt bed is in low position, side rails up, call light and belongings are in reach. Fall risk light is on outside pts room. Pt encouraged to call for assistance as needed. Will continue with hourly rounds for PO intake, pain needs, toileting and repositioning as needed. Problem: Bleeding:  Goal: Will show no signs and symptoms of excessive bleeding  Description: Will show no signs and symptoms of excessive bleeding  11/6/2020 0034 by Luis Antonio Okeefe RN  Outcome: Ongoing  Note: Patient's hemoglobin this AM:   Recent Labs     11/05/20  0340   HGB 11.4*     Patient's platelet count this AM:   Recent Labs     11/05/20  0340       Thrombocytopenia not present at this time. Patient showing no signs or symptoms of active bleeding. Transfusion not indicated at this time. Patient verbalizes understanding of all instructions. Will continue to assess and implement POC. Call light within reach and hourly rounding in place. Problem: PROTECTIVE PRECAUTIONS  Goal: Patient will remain free of nosocomial Infections  11/6/2020 0034 by Luis Antonio Okeefe RN  Outcome: Ongoing  Note: Patient compliant with wearing mask while ambulating in hallway. Verbalizes the importance of masking and hand hygiene.      Problem: Infection - Central Venous Catheter-Associated Bloodstream Infection:  Goal: Will show no infection signs and symptoms  Description: Will show no infection signs and symptoms  11/6/2020 0034 by Luis Antonio Okeefe RN  Outcome: Ongoing  Note: CVC site remains free of

## 2020-11-06 NOTE — PROGRESS NOTES
NUTRITION ASSESSMENT  Admission Date: 11/4/2020     Type and Reason for Visit: Initial, Consult    NUTRITION RECOMMENDATIONS:   1. PO Diet: Continue current diet low microbial  2. Nutrition Education: Diet s/p BMT provided w/handouts. NUTRITION ASSESSMENT:  Nutrition eval for BMT; pt admitted for Prerna the AUTO 11/6. Pt eating well on low microbial diet thus far. Weight has been stable. RD reviewed diet rec'd for diet s/p BMT and food safety guidelines. RD will monitor for continued adequate po intake post BMT. MALNUTRITION ASSESSMENT  Context of Malnutrition: Acute Illness(on chronic)   Malnutrition Status: No malnutrition  Findings of the 6 clinical characteristics of malnutrition (Minimum of 2 out of 6 clinical characteristics is required to make the diagnosis of moderate or severe Protein Calorie Malnutrition based on AND/ASPEN Guidelines):  Energy Intake: No significant decrease in energy intake    Energy Intake Time: No significant    Weight Loss %: No significant loss   Weight loss Time: No significant    Due to current CDC guidelines recommending 6-ft distancing for social isolation for COVID19 prevention, physical aspects of the malnutrition assessment were withheld at this time. NUTRITION DIAGNOSIS   Problem: Problem #1: Increased nutrient needs  Etiology: Increased demand for nutrient  Signs & Symptoms: chemo; BMT     NUTRITION INTERVENTION  Food and/or Nutrient Delivery:Continue Current diet   Nutrition education/counseling/coordination of care: Continue Inpatient Monitoring  or Education Initiated     NUTRITION MONITORING & EVALUATION:  Evaluation:Goals set   Goals:Goals: pt will maintain adequate po intake by consuming greater than 75% of meals to promote meeting increased nutrient needs.    Monitoring: Diet Tolerance , Meal Intake  or Pertinent Labs      OBJECTIVE DATA:  · Nutrition-Focused Physical Findings: lbm pta;   · Wounds None      Past Medical History:   Diagnosis Date    Cancer ([facility]     Hyperlipidemia     Hypertension     PONV (postoperative nausea and vomiting)         ANTHROPOMETRICS  Current Height: 5' 3\" (160 cm)  Current Weight: 148 lb 12.8 oz (67.5 kg)    Admission weight: 144 lb (65.3 kg)  Ideal Bodyweight 115 lb    Usual Bodyweight 134 per pt  Weight Changes gains pta per pt from PO intake; slight wt gain since admit from fluid. BMI BMI (Calculated): 26.4    Wt Readings from Last 50 Encounters:   11/06/20 148 lb 12.8 oz (67.5 kg)   10/27/20 144 lb (65.3 kg)   10/16/20 149 lb 7.6 oz (67.8 kg)   10/05/20 147 lb (66.7 kg)   06/09/20 141 lb (64 kg)   06/04/20 144 lb (65.3 kg)   06/06/18 135 lb (61.2 kg)   10/09/17 136 lb 12.8 oz (62.1 kg)       COMPARATIVE STANDARDS  Estimated Total Kcals/Day : 20-25 Admission Bodyweight  (65.3 kg) 6312-6149 kcal    Estimated Total Protein (g/day) : 1.3-1.5 Ideal Bodyweight  (52.3 kg) 68-78g/day  Estimated Daily Total Fluid (ml/day): 6391-6623 mL per day     Food / Nutrition-Related History  Pre-Admission / Home Diet:  Pre-Admission/Home Diet: General(low microbial)   Home Supplements / Herbals:    none noted  Food Restrictions / Cultural Requests:    none noted    Current Nutrition Therapies   DIET GENERAL;     PO Intake: %  PO Supplement: None   PO Supplement Intake: None   IVF: during chemo then PRN. NUTRITION RISK LEVEL: Risk Level:  Moderate     Ana Sung RD, LD  Isaac:  [phone]  Office:  [phone]

## 2020-11-06 NOTE — PROCEDURES
Transplant (T0) Progress Note      Mi Perez                           Blood Type: O pos    11/6/2020                           Start time:11:19 Completion time                                                                                                                         11:47    Transplant Type: Autologous    Product Type: PBSC (peripheral blood stem cell)    Product Unit Number: [id number] AO,PARAG, and CO    Cell Count: 2.9  x 10^6  Volume: 210 mL      Product Manipulation:      Volume Reduction  No  Plasma Depletion  No  RBC Depletion  No    Catheter lumen used for infusion: red             Positive Blood Return: Yes    Donor Name: autologous                                     Blood Type: autologous     Relationship: autologous                          Donor Sex: autologous    Premeds Given: acetaminophen, benadryl, and solu-cortef. Adverse Reaction(s) and treatment: Baseline vital signs obtained prior to infusion and monitoring completed throughout per [facility] protocol - see flowsheets. All IVFs turned down to [facility] during stem cell infusion. Stem cell product(s) verified with 2nd RN Lashaun Lay prior to infusion using 2 patient identifiers. Infused via gravity with rate controlled by Markos Rea RN per [facility] protocols. Emergency medications epinephrine, benadryl, & hydrocortisone available as needed. Emergency medical equipment available as needed including telemetry monitoring throughout infusion, supplemental O2, suction equipment, and crash cart. RN at bedside throughout infusion.    Markos Rea

## 2020-11-06 NOTE — PLAN OF CARE
Problem: Falls - Risk of:  Goal: Will remain free from falls  Description: Will remain free from falls  Outcome: Ongoing    Pt with activity orders for up ad salome. Encouraged pt to be up OOB as much as possible throughout the day and for all meals. Encouraged frequent short naps as necessary to preserve energy but instructed that while awake, pt should be OOB. Encouraged pt to ambulate in halls. Pt seen up ad salome in halls several times this shift. Pt is visualized to be OOB % of the time this shift. Will continue to encourage frequent activity. Problem: Bleeding:  Goal: Will show no signs and symptoms of excessive bleeding  Description: Will show no signs and symptoms of excessive bleeding  Outcome: Ongoing   Patient's hemoglobin this AM:   Recent Labs     11/06/20  0325   HGB 10.6*     Patient's platelet count this AM:   Recent Labs     11/06/20  0325       Thrombocytopenia Precautions in place. Patient showing no signs or symptoms of active bleeding. Transfusion not indicated at this time. Patient verbalizes understanding of all instructions. Will continue to assess and implement POC. Call light within reach and hourly rounding in place. Problem: PROTECTIVE PRECAUTIONS  Goal: Patient will remain free of nosocomial Infections  Outcome: Ongoing   Pt verbalize understanding of precautions. Will monitor. Problem: Nutrition Deficit:  Goal: Ability to achieve adequate nutritional intake will improve  Description: Ability to achieve adequate nutritional intake will improve  Outcome: Ongoing   Encourage small frequent meals. Will monitor. Problem: Discharge Planning:  Goal: Discharged to appropriate level of care  Description: Discharged to appropriate level of care  Outcome: Ongoing   Pt verbalize understanding of treatment plan. Will monitor.      Problem: Infection - Central Venous Catheter-Associated Bloodstream Infection:  Goal: Will show no infection signs and symptoms  Description: Will show no infection signs and symptoms  Outcome: Ongoing   CVC site remains free of signs/symptoms of infection. No drainage, edema, erythema, pain, or warmth noted at site. Dressing changes continue per protocol and on an as needed basis - see flowsheet. Performed CHG bath today per [facility] protocol utilizing CHG solution in the shower. CVC site cleansed with CHG wipe over dressing, skin surrounding dressing, and first 6\" of IV tubing. Pt tolerated well. Continued to encourage daily CHG bathing per [facility] protocol.

## 2020-11-07 LAB
ANION GAP SERPL CALCULATED.3IONS-SCNC: 7 MMOL/L (ref 3–16)
BASOPHILS ABSOLUTE: 0 K/UL (ref 0–0.2)
BASOPHILS RELATIVE PERCENT: 0.2 %
BUN BLDV-MCNC: 9 MG/DL (ref 7–20)
CALCIUM SERPL-MCNC: 8 MG/DL (ref 8.3–10.6)
CHLORIDE BLD-SCNC: 106 MMOL/L (ref 99–110)
CO2: 27 MMOL/L (ref 21–32)
CREAT SERPL-MCNC: <0.5 MG/DL (ref 0.6–1.2)
EOSINOPHILS ABSOLUTE: 0 K/UL (ref 0–0.6)
EOSINOPHILS RELATIVE PERCENT: 0.1 %
GFR AFRICAN AMERICAN: >60
GFR NON-AFRICAN AMERICAN: >60
GLUCOSE BLD-MCNC: 89 MG/DL (ref 70–99)
HCT VFR BLD CALC: 29.5 % (ref 36–48)
HEMOGLOBIN: 10.3 G/DL (ref 12–16)
LYMPHOCYTES ABSOLUTE: 0.4 K/UL (ref 1–5.1)
LYMPHOCYTES RELATIVE PERCENT: 17 %
MCH RBC QN AUTO: 30.9 PG (ref 26–34)
MCHC RBC AUTO-ENTMCNC: 34.8 G/DL (ref 31–36)
MCV RBC AUTO: 88.7 FL (ref 80–100)
MONOCYTES ABSOLUTE: 0.1 K/UL (ref 0–1.3)
MONOCYTES RELATIVE PERCENT: 5.4 %
NEUTROPHILS ABSOLUTE: 1.9 K/UL (ref 1.7–7.7)
NEUTROPHILS RELATIVE PERCENT: 77.3 %
PDW BLD-RTO: 13.7 % (ref 12.4–15.4)
PLATELET # BLD: 231 K/UL (ref 135–450)
PMV BLD AUTO: 7.2 FL (ref 5–10.5)
POTASSIUM SERPL-SCNC: 3.7 MMOL/L (ref 3.5–5.1)
RBC # BLD: 3.32 M/UL (ref 4–5.2)
SODIUM BLD-SCNC: 140 MMOL/L (ref 136–145)
URIC ACID, SERUM: 4.1 MG/DL (ref 2.6–6)
WBC # BLD: 2.4 K/UL (ref 4–11)

## 2020-11-07 PROCEDURE — 6360000002 HC RX W HCPCS: Performed by: NURSE PRACTITIONER

## 2020-11-07 PROCEDURE — 80048 BASIC METABOLIC PNL TOTAL CA: CPT

## 2020-11-07 PROCEDURE — 2060000000 HC ICU INTERMEDIATE R&B

## 2020-11-07 PROCEDURE — 6370000000 HC RX 637 (ALT 250 FOR IP): Performed by: INTERNAL MEDICINE

## 2020-11-07 PROCEDURE — 36592 COLLECT BLOOD FROM PICC: CPT

## 2020-11-07 PROCEDURE — 2580000003 HC RX 258: Performed by: NURSE PRACTITIONER

## 2020-11-07 PROCEDURE — 84550 ASSAY OF BLOOD/URIC ACID: CPT

## 2020-11-07 PROCEDURE — 6370000000 HC RX 637 (ALT 250 FOR IP): Performed by: NURSE PRACTITIONER

## 2020-11-07 PROCEDURE — 85025 COMPLETE CBC W/AUTO DIFF WBC: CPT

## 2020-11-07 RX ORDER — CALCIUM CARBONATE 200(500)MG
1000 TABLET,CHEWABLE ORAL 3 TIMES DAILY PRN
Status: DISCONTINUED | OUTPATIENT
Start: 2020-11-07 | End: 2020-11-19 | Stop reason: HOSPADM

## 2020-11-07 RX ADMIN — ANTACID TABLETS 1000 MG: 500 TABLET, CHEWABLE ORAL at 18:03

## 2020-11-07 RX ADMIN — DEXTROSE AND SODIUM CHLORIDE: 5; 450 INJECTION, SOLUTION INTRAVENOUS at 03:24

## 2020-11-07 RX ADMIN — VALACYCLOVIR HYDROCHLORIDE 500 MG: 500 TABLET, FILM COATED ORAL at 21:12

## 2020-11-07 RX ADMIN — LISINOPRIL AND HYDROCHLOROTHIAZIDE 1 TABLET: 12.5; 1 TABLET ORAL at 08:35

## 2020-11-07 RX ADMIN — PROCHLORPERAZINE MALEATE 10 MG: 10 TABLET ORAL at 21:12

## 2020-11-07 RX ADMIN — AMLODIPINE BESYLATE 5 MG: 5 TABLET ORAL at 08:36

## 2020-11-07 RX ADMIN — VALACYCLOVIR HYDROCHLORIDE 500 MG: 500 TABLET, FILM COATED ORAL at 08:36

## 2020-11-07 RX ADMIN — PROCHLORPERAZINE MALEATE 10 MG: 10 TABLET ORAL at 16:54

## 2020-11-07 RX ADMIN — ENOXAPARIN SODIUM 40 MG: 40 INJECTION SUBCUTANEOUS at 18:02

## 2020-11-07 RX ADMIN — Medication 10 ML: at 08:36

## 2020-11-07 RX ADMIN — DOXEPIN HYDROCHLORIDE 10 MG: 10 CAPSULE ORAL at 21:00

## 2020-11-07 RX ADMIN — THERA TABS 1 TABLET: TAB at 08:36

## 2020-11-07 RX ADMIN — FLUCONAZOLE 400 MG: 200 TABLET ORAL at 08:35

## 2020-11-07 RX ADMIN — PROCHLORPERAZINE MALEATE 10 MG: 10 TABLET ORAL at 09:44

## 2020-11-07 ASSESSMENT — PAIN SCALES - GENERAL
PAINLEVEL_OUTOF10: 0

## 2020-11-07 NOTE — PLAN OF CARE
show no infection signs and symptoms  11/7/2020 0249 by Mikayla Saenz RN  Outcome: Ongoing  Note: CVC site remains free of signs/symptoms of infection. No drainage, edema, erythema, pain, or warmth noted at site. Dressing changes continue per protocol and on an as needed basis - see flowsheet. Compliant with [facility] Bath Protocol:  Performed CHG bath today per [facility] protocol utilizing CHG solution in the shower. CVC site cleansed with CHG wipe over dressing, skin surrounding dressing, and first 6\" of IV tubing. Pt tolerated well. Continued to encourage daily CHG bathing per [facility] protocol.

## 2020-11-07 NOTE — PROGRESS NOTES
oz (67.8 kg)       General: Awake, alert and oriented. HEENT: normocephalic, alopecia, PERRL, no scleral erythema or icterus, Oral mucosa moist and intact, throat clear  NECK: supple without palpable adenopathy  BACK: Straight negative CVAT  SKIN: warm dry and intact without lesions rashes or masses  CHEST: CTA bilaterally without use of accessory muscles  CV: Normal S1 S2, RRR, no MRG  ABD: NT ND normoactive BS, no palpable masses or hepatosplenomegaly  EXTREMITIES: without edema, denies calf tenderness  NEURO: CN II - XII grossly intact  CATHETER: Right IJ Trifusion (IR, 10/27/20) - CDI    Data:   CBC:   Recent Labs     11/05/20 0340 11/06/20 0325 11/07/20  0320   WBC 4.3 5.4 2.4*   HGB 11.4* 10.6* 10.3*   HCT 33.6* 31.0* 29.5*   MCV 90.0 88.8 88.7    237 231     BMP/Mag:  Recent Labs     11/04/20  1000 11/04/20  1315 11/05/20  0340 11/06/20  0325 11/07/20  0320    136 135* 135* 140   K 5.8* 3.3* 4.4 4.1 3.7    100 105 103 106   CO2 22 25 23 24 27   PHOS 3.5 2.6  --  3.5  --    BUN 11 9 9 14 9   CREATININE <0.5* <0.5* <0.5* <0.5* <0.5*   MG 2.20  --  2.40  --   --      LIVP:   Recent Labs     11/04/20  1000 11/06/20  0325   AST 22 13*   ALT 19 15   BILIDIR <0.2 <0.2   BILITOT 0.8 0.6   ALKPHOS 96 70     Uric Acid:    Recent Labs     11/07/20  0320   LABURIC 4.1     Coags:   Recent Labs     11/04/20  1000 11/05/20  0340   PROTIME 11.1 10.9   INR 0.96 0.94   APTT 34.4 35.0       PROBLEM LIST:            1. IgG Lambda smoldering multiple myeloma  2. JULIETH  3. OA  4. HTN  5. HLD  6. Insomnia  7. Osteopenia  8. Vit D deficiency.      TREATMENT:            1. RVD x 4 cycles (6/2020 - 9/2020)  2. HD Melphalan 200mg/m2 & ASCT (11/6/20) - 2.94 x10^6 [id number]/kg     ASSESSMENT AND PLAN:            1. IgG Lambda Smoldering Myeloma: RI  - S/p [id number] & ASCT (11/6/20)   - Post-Txp Maintenance: USOR trial - estephania/kika vs kika maintenance     Day +1     2.  ID:  Afebrile, no evidence of infection.    - Cont Diflucan & Valtrex, cont acyclovir at discharge for VZV positive titer.    - Start Levaquin ppx when [address] < 1.5     3. Heme: anemia from chemotherapy   - Transfuse for Hgb < 7 and Platelets < 65I  - No transfusion today     4. Metabolic: Electrolytes and renal fxn stable except hypoNa and steroid induced hyperglycemia   - Cont IVF: D5.45NS @ 200 ml /hr, decrease to 50 mL/hr after transplant   - Replace potassium and magnesium per PRN orders     5. Cardiac:   - H/o HTN & HLD  HTN:  stable  - Cont amlodipine 5 mg & lisinopril-HCTZ 10-12.5mg  HLD:    - Hold statin marj-transplant      6. GI / Nutrition:    Nutrition:  Appetite and oral intake is good. - Cont MVI  - Cont low microbial diet   - Follow closely with dietary     7. Psych:   Insomnia:  - Cont doxepin 10 mg nightly  - Cont melatonin PRN       - DVT Prophylaxis: Platelets >31,384 cells/dL, - daily lovenox prophylaxis ordered  Contraindications to pharmacologic prophylaxis: None  Contraindications to mechanical prophylaxis: None    - Disposition:  Once ANC >1 and recovered from toxicities of transplant      Anastasia Leiva MD     11/7/2020   8:31 AM

## 2020-11-07 NOTE — PLAN OF CARE
Problem: Falls - Risk of:  Goal: Will remain free from falls  Description: Will remain free from falls  11/7/2020 1640 by Elina Waite RN  Outcome: Ongoing  Note: Orthostatic vital signs obtained at start of shift - see flowsheet for details. Pt does not meet criteria for orthostasis. Pt is a Med fall risk. See [place] Siemens Fall Score and ABCDS Injury Risk assessments. - Screening for Orthostasis AND not a [place] Risk per COPPOLA/ABCDS: Pt bed is in low position, side rails up, call light and belongings are in reach. Fall risk light is on outside pts room. Pt encouraged to call for assistance as needed. Will continue with hourly rounds for PO intake, pain needs, toileting and repositioning as needed. Problem: Bleeding:  Goal: Will show no signs and symptoms of excessive bleeding  Description: Will show no signs and symptoms of excessive bleeding  11/7/2020 1640 by Elina Waite RN  Outcome: Ongoing  Note: Patient's hemoglobin this AM:   Recent Labs     11/07/20  0320   HGB 10.3*     Patient's platelet count this AM:   Recent Labs     11/07/20  0320       Thrombocytopenia Precautions in place. Patient showing no signs or symptoms of active bleeding. Transfusion not indicated at this time. Patient verbalizes understanding of all instructions. Will continue to assess and implement POC. Call light within reach and hourly rounding in place. Problem: PROTECTIVE PRECAUTIONS  Goal: Patient will remain free of nosocomial Infections  11/7/2020 1640 by Elina Waite RN  Outcome: Ongoing  Note:   Pt educated on wearing mask when in hallways. Pt, staff, and visitors adhering to handwashing guidelines. Pt showers daily with chlorhexidine and linens changed daily per protocol. Pt verbalizes understanding of low microbial diet. Will continue to monitor.        Problem: Infection - Central Venous Catheter-Associated Bloodstream Infection:  Goal: Will show no infection signs and symptoms  Description: Will show no infection signs and symptoms  11/7/2020 1640 by Angella Espinal RN  Outcome: Ongoing  Note: CVC site remains free of signs/symptoms of infection. No drainage, edema, erythema, pain, or warmth noted at site. Dressing changes continue per protocol and on an as needed basis - see flowsheet. Compliant with [facility] Bath Protocol:  Performed CHG bath today per [facility] protocol utilizing CHG solution in the shower. CVC site cleansed with CHG wipe over dressing, skin surrounding dressing, and first 6\" of IV tubing. Pt tolerated well. Continued to encourage daily CHG bathing per [facility] protocol.

## 2020-11-08 LAB
ANION GAP SERPL CALCULATED.3IONS-SCNC: 8 MMOL/L (ref 3–16)
BASOPHILS ABSOLUTE: 0 K/UL (ref 0–0.2)
BASOPHILS RELATIVE PERCENT: 1 %
BUN BLDV-MCNC: 7 MG/DL (ref 7–20)
C DIFF TOXIN/ANTIGEN: NORMAL
CALCIUM SERPL-MCNC: 8.3 MG/DL (ref 8.3–10.6)
CHLORIDE BLD-SCNC: 96 MMOL/L (ref 99–110)
CO2: 25 MMOL/L (ref 21–32)
CREAT SERPL-MCNC: <0.5 MG/DL (ref 0.6–1.2)
EOSINOPHILS ABSOLUTE: 0 K/UL (ref 0–0.6)
EOSINOPHILS RELATIVE PERCENT: 0.8 %
GFR AFRICAN AMERICAN: >60
GFR NON-AFRICAN AMERICAN: >60
GLUCOSE BLD-MCNC: 106 MG/DL (ref 70–99)
HCT VFR BLD CALC: 29.9 % (ref 36–48)
HEMOGLOBIN: 10.4 G/DL (ref 12–16)
LYMPHOCYTES ABSOLUTE: 0.2 K/UL (ref 1–5.1)
LYMPHOCYTES RELATIVE PERCENT: 12.6 %
MCH RBC QN AUTO: 30.9 PG (ref 26–34)
MCHC RBC AUTO-ENTMCNC: 35 G/DL (ref 31–36)
MCV RBC AUTO: 88.4 FL (ref 80–100)
MONOCYTES ABSOLUTE: 0 K/UL (ref 0–1.3)
MONOCYTES RELATIVE PERCENT: 2.4 %
NEUTROPHILS ABSOLUTE: 1 K/UL (ref 1.7–7.7)
NEUTROPHILS RELATIVE PERCENT: 83.2 %
PDW BLD-RTO: 13.9 % (ref 12.4–15.4)
PLATELET # BLD: 234 K/UL (ref 135–450)
PMV BLD AUTO: 6.7 FL (ref 5–10.5)
POTASSIUM SERPL-SCNC: 3.2 MMOL/L (ref 3.5–5.1)
RBC # BLD: 3.38 M/UL (ref 4–5.2)
SODIUM BLD-SCNC: 129 MMOL/L (ref 136–145)
URIC ACID, SERUM: 2.9 MG/DL (ref 2.6–6)
WBC # BLD: 1.2 K/UL (ref 4–11)

## 2020-11-08 PROCEDURE — 2580000003 HC RX 258: Performed by: NURSE PRACTITIONER

## 2020-11-08 PROCEDURE — 2060000000 HC ICU INTERMEDIATE R&B

## 2020-11-08 PROCEDURE — 36592 COLLECT BLOOD FROM PICC: CPT

## 2020-11-08 PROCEDURE — 6370000000 HC RX 637 (ALT 250 FOR IP): Performed by: NURSE PRACTITIONER

## 2020-11-08 PROCEDURE — 85025 COMPLETE CBC W/AUTO DIFF WBC: CPT

## 2020-11-08 PROCEDURE — 2580000003 HC RX 258: Performed by: INTERNAL MEDICINE

## 2020-11-08 PROCEDURE — 87449 NOS EACH ORGANISM AG IA: CPT

## 2020-11-08 PROCEDURE — 6370000000 HC RX 637 (ALT 250 FOR IP): Performed by: INTERNAL MEDICINE

## 2020-11-08 PROCEDURE — 84550 ASSAY OF BLOOD/URIC ACID: CPT

## 2020-11-08 PROCEDURE — 6360000002 HC RX W HCPCS: Performed by: NURSE PRACTITIONER

## 2020-11-08 PROCEDURE — 6360000002 HC RX W HCPCS: Performed by: INTERNAL MEDICINE

## 2020-11-08 PROCEDURE — 87324 CLOSTRIDIUM AG IA: CPT

## 2020-11-08 PROCEDURE — 80048 BASIC METABOLIC PNL TOTAL CA: CPT

## 2020-11-08 RX ORDER — PROCHLORPERAZINE EDISYLATE 5 MG/ML
10 INJECTION INTRAMUSCULAR; INTRAVENOUS 4 TIMES DAILY
Status: DISCONTINUED | OUTPATIENT
Start: 2020-11-08 | End: 2020-11-18

## 2020-11-08 RX ORDER — 0.9 % SODIUM CHLORIDE 0.9 %
1000 INTRAVENOUS SOLUTION INTRAVENOUS ONCE
Status: COMPLETED | OUTPATIENT
Start: 2020-11-08 | End: 2020-11-09

## 2020-11-08 RX ORDER — PROCHLORPERAZINE MALEATE 10 MG
10 TABLET ORAL 4 TIMES DAILY
Status: DISCONTINUED | OUTPATIENT
Start: 2020-11-08 | End: 2020-11-18

## 2020-11-08 RX ORDER — OLANZAPINE 5 MG/1
5 TABLET ORAL DAILY
Status: DISCONTINUED | OUTPATIENT
Start: 2020-11-08 | End: 2020-11-18

## 2020-11-08 RX ORDER — LOPERAMIDE HYDROCHLORIDE 2 MG/1
4 CAPSULE ORAL ONCE
Status: COMPLETED | OUTPATIENT
Start: 2020-11-08 | End: 2020-11-09

## 2020-11-08 RX ORDER — LEVOFLOXACIN 500 MG/1
500 TABLET, FILM COATED ORAL NIGHTLY
Status: DISCONTINUED | OUTPATIENT
Start: 2020-11-08 | End: 2020-11-18

## 2020-11-08 RX ORDER — SODIUM CHLORIDE 9 MG/ML
INJECTION, SOLUTION INTRAVENOUS CONTINUOUS
Status: DISCONTINUED | OUTPATIENT
Start: 2020-11-08 | End: 2020-11-09

## 2020-11-08 RX ORDER — LOPERAMIDE HYDROCHLORIDE 2 MG/1
2 CAPSULE ORAL PRN
Status: DISCONTINUED | OUTPATIENT
Start: 2020-11-08 | End: 2020-11-19 | Stop reason: HOSPADM

## 2020-11-08 RX ADMIN — Medication 10 ML: at 20:44

## 2020-11-08 RX ADMIN — SODIUM CHLORIDE 15 ML: 900 IRRIGANT IRRIGATION at 14:16

## 2020-11-08 RX ADMIN — POTASSIUM CHLORIDE 20 MEQ: 29.8 INJECTION, SOLUTION INTRAVENOUS at 11:49

## 2020-11-08 RX ADMIN — PROCHLORPERAZINE MALEATE 10 MG: 10 TABLET ORAL at 14:15

## 2020-11-08 RX ADMIN — OLANZAPINE 5 MG: 5 TABLET, FILM COATED ORAL at 11:50

## 2020-11-08 RX ADMIN — AMLODIPINE BESYLATE 5 MG: 5 TABLET ORAL at 09:25

## 2020-11-08 RX ADMIN — ENOXAPARIN SODIUM 40 MG: 40 INJECTION SUBCUTANEOUS at 18:18

## 2020-11-08 RX ADMIN — Medication 10 ML: at 09:15

## 2020-11-08 RX ADMIN — LEVOFLOXACIN 500 MG: 500 TABLET, FILM COATED ORAL at 20:43

## 2020-11-08 RX ADMIN — SODIUM CHLORIDE 15 ML: 900 IRRIGANT IRRIGATION at 20:44

## 2020-11-08 RX ADMIN — VALACYCLOVIR HYDROCHLORIDE 500 MG: 500 TABLET, FILM COATED ORAL at 09:25

## 2020-11-08 RX ADMIN — DOXEPIN HYDROCHLORIDE 10 MG: 10 CAPSULE ORAL at 20:43

## 2020-11-08 RX ADMIN — SODIUM CHLORIDE: 9 INJECTION, SOLUTION INTRAVENOUS at 09:28

## 2020-11-08 RX ADMIN — SODIUM CHLORIDE 1000 ML: 9 INJECTION, SOLUTION INTRAVENOUS at 20:46

## 2020-11-08 RX ADMIN — PROCHLORPERAZINE MALEATE 10 MG: 10 TABLET ORAL at 03:36

## 2020-11-08 RX ADMIN — PROCHLORPERAZINE EDISYLATE 10 MG: 5 INJECTION INTRAMUSCULAR; INTRAVENOUS at 09:34

## 2020-11-08 RX ADMIN — POTASSIUM CHLORIDE 20 MEQ: 29.8 INJECTION, SOLUTION INTRAVENOUS at 08:07

## 2020-11-08 RX ADMIN — POTASSIUM CHLORIDE 20 MEQ: 29.8 INJECTION, SOLUTION INTRAVENOUS at 06:40

## 2020-11-08 RX ADMIN — POTASSIUM CHLORIDE 20 MEQ: 29.8 INJECTION, SOLUTION INTRAVENOUS at 09:26

## 2020-11-08 RX ADMIN — SODIUM CHLORIDE 15 ML: 900 IRRIGANT IRRIGATION at 18:23

## 2020-11-08 RX ADMIN — LISINOPRIL AND HYDROCHLOROTHIAZIDE 1 TABLET: 12.5; 1 TABLET ORAL at 11:49

## 2020-11-08 RX ADMIN — PROCHLORPERAZINE MALEATE 10 MG: 10 TABLET ORAL at 22:49

## 2020-11-08 RX ADMIN — SODIUM CHLORIDE: 9 INJECTION, SOLUTION INTRAVENOUS at 18:19

## 2020-11-08 RX ADMIN — VALACYCLOVIR HYDROCHLORIDE 500 MG: 500 TABLET, FILM COATED ORAL at 20:43

## 2020-11-08 RX ADMIN — FLUCONAZOLE 400 MG: 200 TABLET ORAL at 09:25

## 2020-11-08 RX ADMIN — PROCHLORPERAZINE MALEATE 10 MG: 10 TABLET ORAL at 18:18

## 2020-11-08 ASSESSMENT — PAIN SCALES - GENERAL
PAINLEVEL_OUTOF10: 0

## 2020-11-08 NOTE — PLAN OF CARE
show no infection signs and symptoms  11/7/2020 0249 by Brett Medina RN  Outcome: Ongoing  Note: CVC site remains free of signs/symptoms of infection. No drainage, edema, erythema, pain, or warmth noted at site. Dressing changes continue per protocol and on an as needed basis - see flowsheet. Compliant with [facility] Bath Protocol:  Performed CHG bath today per [facility] protocol utilizing CHG solution in the shower. CVC site cleansed with CHG wipe over dressing, skin surrounding dressing, and first 6\" of IV tubing. Pt tolerated well. Continued to encourage daily CHG bathing per [facility] protocol. PT complaining of nausea. Compazine given and pt verbalized an improvement in symptoms.

## 2020-11-08 NOTE — PROGRESS NOTES
[address] [place] [address]  Autologous Progress Note    2020    Gustavo Kilgore    :  1955    MRN:  [id number]    Referring MD: Marilyn Paredes MD  [address]      Subjective:  Nausea with dry heaves overnight. Stools soft but not diarrhea.        ECOG PS:  (1) Restricted in physically strenuous activity, ambulatory and able to do work of light nature    KPS: 90% Able to carry on normal activity; minor signs or symptoms of disease    Isolation:  None     Medications    Scheduled Meds:   levoFLOXacin  500 mg Oral Nightly    [START ON 2020] tbo-filgrastim  300 mcg Subcutaneous QPM    enoxaparin  40 mg Subcutaneous Daily    fluconazole  400 mg Oral Daily    Saline Mouthwash  15 mL Swish & Spit 4x Daily AC & HS    sodium chloride flush  10 mL Intravenous 2 times per day    valACYclovir  500 mg Oral BID    furosemide  40 mg Intravenous Q12H    amLODIPine  5 mg Oral Daily    doxepin  10 mg Oral Nightly    lisinopril-hydroCHLOROthiazide  1 tablet Oral Daily    multivitamin  1 tablet Oral Daily     Continuous Infusions:   sodium chloride      dextrose 5 % and 0.45 % NaCl 50 mL/hr at 20 0324     PRN Meds:.calcium carbonate, morphine, sodium chloride, melatonin, alteplase, magnesium hydroxide, magnesium sulfate, potassium chloride, Saline Mouthwash, prochlorperazine **OR** prochlorperazine, LORazepam **OR** LORazepam    ROS:  As noted above, otherwise remainder of 10-point ROS negative    Physical Exam:     I&O:      Intake/Output Summary (Last 24 hours) at 2020 0851  Last data filed at 2020 0810  Gross per 24 hour   Intake 2125 ml   Output 2325 ml   Net -200 ml       Vital Signs:  /84   Pulse 75   Temp 97.9 °F (36.6 °C) (Oral)   Resp 16   Ht 5' 3\" (1.6 m)   Wt 145 lb 4.8 oz (65.9 kg)   SpO2 99%   BMI 25.74 kg/m²     Weight:    Wt Readings from Last 3 Encounters:   20 145 lb 4.8 oz (65.9 kg)   10/27/20 144 lb (65.3 kg) 10/16/20 149 lb 7.6 oz (67.8 kg)       General: Awake, alert and oriented. HEENT: normocephalic, alopecia, PERRL, no scleral erythema or icterus, Oral mucosa moist and intact, throat clear  NECK: supple without palpable adenopathy  BACK: Straight negative CVAT  SKIN: warm dry and intact without lesions rashes or masses  CHEST: CTA bilaterally without use of accessory muscles  CV: Normal S1 S2, RRR, no MRG  ABD: NT ND normoactive BS, no palpable masses or hepatosplenomegaly  EXTREMITIES: without edema, denies calf tenderness  NEURO: CN II - XII grossly intact  CATHETER: Right IJ Trifusion (IR, 10/27/20) - CDI    Data:   CBC:   Recent Labs     11/06/20 0325 11/07/20 0320 11/08/20  0345   WBC 5.4 2.4* 1.2*   HGB 10.6* 10.3* 10.4*   HCT 31.0* 29.5* 29.9*   MCV 88.8 88.7 88.4    231 234     BMP/Mag:  Recent Labs     11/06/20 0325 11/07/20 0320 11/08/20  0345   * 140 129*   K 4.1 3.7 3.2*    106 96*   CO2 24 27 25   PHOS 3.5  --   --    BUN 14 9 7   CREATININE <0.5* <0.5* <0.5*     LIVP:   Recent Labs     11/06/20  0325   AST 13*   ALT 15   BILIDIR <0.2   BILITOT 0.6   ALKPHOS 70     Uric Acid:    Recent Labs     11/08/20  0345   LABURIC 2.9     Coags:   No results for input(s): PROTIME, INR, APTT in the last 72 hours. PROBLEM LIST:            1. IgG Lambda smoldering multiple myeloma  2. JULIETH  3. OA  4. HTN  5. HLD  6. Insomnia  7. Osteopenia  8. Vit D deficiency.      TREATMENT:            1. RVD x 4 cycles (6/2020 - 9/2020)  2. HD Melphalan 200mg/m2 & ASCT (11/6/20) - 2.94 x10^6 [id number]/kg     ASSESSMENT AND PLAN:            1. IgG Lambda Smoldering Myeloma: [place]  - S/p [id number] & ASCT (11/6/20)   - Post-Txp Maintenance: USOR trial - estephania/kika vs kika maintenance     Day +2     2.  ID:  Afebrile, no evidence of infection.    - Cont Diflucan & Valtrex, cont acyclovir at discharge for VZV positive titer.    - Start Levaquin ppx  (11/8/20)     3. Heme: anemia from chemotherapy   - Transfuse for Hgb < 7 and Platelets < 63V  - No transfusion today     4. Metabolic: Electrolytes and renal fxn stable except hypoNa and steroid induced hyperglycemia   - Cont IVF: D5.45NS @ 200 ml /hr, decrease to 50 mL/hr after transplant   - Replace potassium and magnesium per PRN orders     5. Cardiac:   - H/o HTN & HLD  HTN:  stable  - Cont amlodipine 5 mg & lisinopril-HCTZ 10-12.5mg  HLD:    - Hold statin marj-transplant      6. GI / Nutrition:    Nutrition:  Appetite and oral intake is good. - Cont MVI  - Cont low microbial diet   - Follow closely with dietary     7. Psych:   Insomnia:  - Cont doxepin 10 mg nightly  - Cont melatonin PRN       - DVT Prophylaxis: Platelets >45,496 cells/dL, - daily lovenox prophylaxis ordered  Contraindications to pharmacologic prophylaxis: None  Contraindications to mechanical prophylaxis: None    - Disposition:  Once ANC >1 and recovered from toxicities of transplant      Jenny Lemus MD     11/8/2020   8:51 AM

## 2020-11-08 NOTE — PLAN OF CARE
Problem: Falls - Risk of:  Goal: Will remain free from falls  Description: Will remain free from falls  Outcome: Ongoing  Note:   Orthostatic vital signs obtained at start of shift - see flowsheet for details. Pt does not meet criteria for orthostasis. Pt is a Med fall risk. See Eric Flatten Fall Score and ABCDS Injury Risk assessments. - Screening for Orthostasis AND not a [place] Risk per COPPOLA/ABCDS: Pt bed is in low position, side rails up, call light and belongings are in reach. Fall risk light is on outside pts room. Pt encouraged to call for assistance as needed. Will continue with hourly rounds for PO intake, pain needs, toileting and repositioning as needed. Problem: Bleeding:  Goal: Will show no signs and symptoms of excessive bleeding  Description: Will show no signs and symptoms of excessive bleeding  Outcome: Ongoing  Note: Patient's hemoglobin this AM:   Recent Labs     11/08/20  0345   HGB 10.4*     Patient's platelet count this AM:   Recent Labs     11/08/20  0345       Thrombocytopenia Precautions in place. Patient showing no signs or symptoms of active bleeding. Transfusion not indicated at this time. Patient verbalizes understanding of all instructions. Will continue to assess and implement POC. Call light within reach and hourly rounding in place. Problem: Nutrition Deficit:  Goal: Ability to achieve adequate nutritional intake will improve  Description: Ability to achieve adequate nutritional intake will improve  Outcome: Ongoing  Note: Pt with nausea and vomiting. Zyprexa and Comazine given. Pt able to tolerate crackers only and water. Problem: PROTECTIVE PRECAUTIONS  Goal: Patient will remain free of nosocomial Infections  11/8/2020 1643 by Angella Espinal RN  Note:   Pt educated on wearing mask when in hallways. Pt, staff, and visitors adhering to handwashing guidelines. Pt showers daily with chlorhexidine and linens changed daily per protocol.  Pt verbalizes understanding of low microbial diet. Will continue to monitor.

## 2020-11-09 ENCOUNTER — APPOINTMENT (OUTPATIENT)
Dept: GENERAL RADIOLOGY | Age: 65
DRG: 016 | End: 2020-11-09
Attending: INTERNAL MEDICINE
Payer: MEDICARE

## 2020-11-09 LAB
ALBUMIN SERPL-MCNC: 3.9 G/DL (ref 3.4–5)
ALP BLD-CCNC: 61 U/L (ref 40–129)
ALT SERPL-CCNC: 32 U/L (ref 10–40)
ANION GAP SERPL CALCULATED.3IONS-SCNC: 10 MMOL/L (ref 3–16)
APTT: 31.8 SEC (ref 24.2–36.2)
AST SERPL-CCNC: 42 U/L (ref 15–37)
BASOPHILS ABSOLUTE: 0 K/UL (ref 0–0.2)
BASOPHILS RELATIVE PERCENT: 0 %
BILIRUB SERPL-MCNC: 0.7 MG/DL (ref 0–1)
BILIRUBIN DIRECT: <0.2 MG/DL (ref 0–0.3)
BILIRUBIN URINE: NEGATIVE
BILIRUBIN, INDIRECT: ABNORMAL MG/DL (ref 0–1)
BLOOD, URINE: NEGATIVE
BUN BLDV-MCNC: 6 MG/DL (ref 7–20)
CALCIUM SERPL-MCNC: 8.2 MG/DL (ref 8.3–10.6)
CHLORIDE BLD-SCNC: 101 MMOL/L (ref 99–110)
CLARITY: CLEAR
CO2: 23 MMOL/L (ref 21–32)
COLOR: YELLOW
CREAT SERPL-MCNC: <0.5 MG/DL (ref 0.6–1.2)
EOSINOPHILS ABSOLUTE: 0 K/UL (ref 0–0.6)
EOSINOPHILS RELATIVE PERCENT: 1 %
GFR AFRICAN AMERICAN: >60
GFR NON-AFRICAN AMERICAN: >60
GLUCOSE BLD-MCNC: 105 MG/DL (ref 70–99)
GLUCOSE URINE: NEGATIVE MG/DL
HCT VFR BLD CALC: 29.9 % (ref 36–48)
HEMOGLOBIN: 10.3 G/DL (ref 12–16)
INR BLD: 0.95 (ref 0.86–1.14)
KETONES, URINE: ABNORMAL MG/DL
LACTATE DEHYDROGENASE: 210 U/L (ref 100–190)
LEUKOCYTE ESTERASE, URINE: NEGATIVE
LYMPHOCYTES ABSOLUTE: 0 K/UL (ref 1–5.1)
LYMPHOCYTES RELATIVE PERCENT: 4 %
MAGNESIUM: 2.4 MG/DL (ref 1.8–2.4)
MCH RBC QN AUTO: 30.6 PG (ref 26–34)
MCHC RBC AUTO-ENTMCNC: 34.6 G/DL (ref 31–36)
MCV RBC AUTO: 88.5 FL (ref 80–100)
MICROSCOPIC EXAMINATION: ABNORMAL
MONOCYTES ABSOLUTE: 0 K/UL (ref 0–1.3)
MONOCYTES RELATIVE PERCENT: 2 %
NEUTROPHILS ABSOLUTE: 0.9 K/UL (ref 1.7–7.7)
NEUTROPHILS RELATIVE PERCENT: 93 %
NITRITE, URINE: NEGATIVE
PDW BLD-RTO: 13.8 % (ref 12.4–15.4)
PH UA: 6 (ref 5–8)
PHOSPHORUS: 3 MG/DL (ref 2.5–4.9)
PLATELET # BLD: 241 K/UL (ref 135–450)
PMV BLD AUTO: 7 FL (ref 5–10.5)
POTASSIUM SERPL-SCNC: 3.5 MMOL/L (ref 3.5–5.1)
PROTEIN UA: NEGATIVE MG/DL
PROTHROMBIN TIME: 11 SEC (ref 10–13.2)
RBC # BLD: 3.38 M/UL (ref 4–5.2)
RBC # BLD: NORMAL 10*6/UL
SODIUM BLD-SCNC: 134 MMOL/L (ref 136–145)
SPECIFIC GRAVITY UA: 1.01 (ref 1–1.03)
TOTAL PROTEIN: 5.6 G/DL (ref 6.4–8.2)
URIC ACID, SERUM: 3 MG/DL (ref 2.6–6)
URINE TYPE: ABNORMAL
UROBILINOGEN, URINE: 0.2 E.U./DL
WBC # BLD: 1 K/UL (ref 4–11)

## 2020-11-09 PROCEDURE — 6370000000 HC RX 637 (ALT 250 FOR IP): Performed by: INTERNAL MEDICINE

## 2020-11-09 PROCEDURE — 2060000000 HC ICU INTERMEDIATE R&B

## 2020-11-09 PROCEDURE — 36592 COLLECT BLOOD FROM PICC: CPT

## 2020-11-09 PROCEDURE — 2580000003 HC RX 258: Performed by: INTERNAL MEDICINE

## 2020-11-09 PROCEDURE — 85610 PROTHROMBIN TIME: CPT

## 2020-11-09 PROCEDURE — 6370000000 HC RX 637 (ALT 250 FOR IP): Performed by: NURSE PRACTITIONER

## 2020-11-09 PROCEDURE — 85730 THROMBOPLASTIN TIME PARTIAL: CPT

## 2020-11-09 PROCEDURE — 94761 N-INVAS EAR/PLS OXIMETRY MLT: CPT

## 2020-11-09 PROCEDURE — 84550 ASSAY OF BLOOD/URIC ACID: CPT

## 2020-11-09 PROCEDURE — 6360000002 HC RX W HCPCS: Performed by: NURSE PRACTITIONER

## 2020-11-09 PROCEDURE — 83735 ASSAY OF MAGNESIUM: CPT

## 2020-11-09 PROCEDURE — 71045 X-RAY EXAM CHEST 1 VIEW: CPT

## 2020-11-09 PROCEDURE — 80076 HEPATIC FUNCTION PANEL: CPT

## 2020-11-09 PROCEDURE — 80048 BASIC METABOLIC PNL TOTAL CA: CPT

## 2020-11-09 PROCEDURE — 83615 LACTATE (LD) (LDH) ENZYME: CPT

## 2020-11-09 PROCEDURE — 84100 ASSAY OF PHOSPHORUS: CPT

## 2020-11-09 PROCEDURE — 81003 URINALYSIS AUTO W/O SCOPE: CPT

## 2020-11-09 PROCEDURE — 2580000003 HC RX 258: Performed by: NURSE PRACTITIONER

## 2020-11-09 PROCEDURE — 85025 COMPLETE CBC W/AUTO DIFF WBC: CPT

## 2020-11-09 RX ORDER — SODIUM CHLORIDE AND POTASSIUM CHLORIDE .9; .15 G/100ML; G/100ML
SOLUTION INTRAVENOUS CONTINUOUS
Status: DISCONTINUED | OUTPATIENT
Start: 2020-11-09 | End: 2020-11-18

## 2020-11-09 RX ADMIN — FLUCONAZOLE 400 MG: 200 TABLET ORAL at 08:32

## 2020-11-09 RX ADMIN — OLANZAPINE 5 MG: 5 TABLET, FILM COATED ORAL at 08:32

## 2020-11-09 RX ADMIN — POTASSIUM CHLORIDE AND SODIUM CHLORIDE: 900; 150 INJECTION, SOLUTION INTRAVENOUS at 08:33

## 2020-11-09 RX ADMIN — LOPERAMIDE HYDROCHLORIDE 4 MG: 2 CAPSULE ORAL at 00:34

## 2020-11-09 RX ADMIN — PROCHLORPERAZINE MALEATE 10 MG: 10 TABLET ORAL at 16:44

## 2020-11-09 RX ADMIN — LEVOFLOXACIN 500 MG: 500 TABLET, FILM COATED ORAL at 20:39

## 2020-11-09 RX ADMIN — SODIUM CHLORIDE: 9 INJECTION, SOLUTION INTRAVENOUS at 00:35

## 2020-11-09 RX ADMIN — PROCHLORPERAZINE MALEATE 10 MG: 10 TABLET ORAL at 13:01

## 2020-11-09 RX ADMIN — Medication 10 ML: at 08:33

## 2020-11-09 RX ADMIN — Medication 10 ML: at 20:39

## 2020-11-09 RX ADMIN — VALACYCLOVIR HYDROCHLORIDE 500 MG: 500 TABLET, FILM COATED ORAL at 08:32

## 2020-11-09 RX ADMIN — VALACYCLOVIR HYDROCHLORIDE 500 MG: 500 TABLET, FILM COATED ORAL at 20:39

## 2020-11-09 RX ADMIN — PROCHLORPERAZINE MALEATE 10 MG: 10 TABLET ORAL at 20:39

## 2020-11-09 RX ADMIN — POTASSIUM CHLORIDE AND SODIUM CHLORIDE: 900; 150 INJECTION, SOLUTION INTRAVENOUS at 20:01

## 2020-11-09 RX ADMIN — SODIUM CHLORIDE 15 ML: 900 IRRIGANT IRRIGATION at 20:40

## 2020-11-09 RX ADMIN — ENOXAPARIN SODIUM 40 MG: 40 INJECTION SUBCUTANEOUS at 17:39

## 2020-11-09 RX ADMIN — DOXEPIN HYDROCHLORIDE 10 MG: 10 CAPSULE ORAL at 20:39

## 2020-11-09 RX ADMIN — PROCHLORPERAZINE MALEATE 10 MG: 10 TABLET ORAL at 08:32

## 2020-11-09 RX ADMIN — SODIUM CHLORIDE 15 ML: 900 IRRIGANT IRRIGATION at 16:44

## 2020-11-09 ASSESSMENT — PAIN SCALES - GENERAL
PAINLEVEL_OUTOF10: 0

## 2020-11-09 NOTE — PROGRESS NOTES
[facility]  Autologous Progress Note    2020    Lisandro Yancey    :  1955    MRN:  [id number]    Referring MD: Danielle Farias MD  [address] [address] [facility], [address]      Subjective:  Nausea with dry heaves overnight. Stools soft but not diarrhea. Feels better this morning. ECOG PS:  (1) Restricted in physically strenuous activity, ambulatory and able to do work of light nature    KPS: 90% Able to carry on normal activity; minor signs or symptoms of disease    Isolation:  None     Medications    Scheduled Meds:   levoFLOXacin  500 mg Oral Nightly    prochlorperazine  10 mg Oral 4x Daily    Or    prochlorperazine  10 mg Intravenous 4x Daily    OLANZapine  5 mg Oral Daily    [START ON 2020] tbo-filgrastim  300 mcg Subcutaneous QPM    enoxaparin  40 mg Subcutaneous Daily    fluconazole  400 mg Oral Daily    Saline Mouthwash  15 mL Swish & Spit 4x Daily AC & HS    sodium chloride flush  10 mL Intravenous 2 times per day    valACYclovir  500 mg Oral BID    furosemide  40 mg Intravenous Q12H    amLODIPine  5 mg Oral Daily    doxepin  10 mg Oral Nightly    lisinopril-hydroCHLOROthiazide  1 tablet Oral Daily    multivitamin  1 tablet Oral Daily     Continuous Infusions:   sodium chloride 100 mL/hr at 20 0035    sodium chloride       PRN Meds:. [COMPLETED] loperamide **FOLLOWED BY** loperamide, calcium carbonate, morphine, sodium chloride, melatonin, alteplase, magnesium hydroxide, magnesium sulfate, potassium chloride, Saline Mouthwash, LORazepam **OR** LORazepam    ROS:  As noted above, otherwise remainder of 10-point ROS negative    Physical Exam:     I&O:      Intake/Output Summary (Last 24 hours) at 2020 0719  Last data filed at 2020 0659  Gross per 24 hour   Intake 3776 ml   Output 4900 ml   Net -1124 ml       Vital Signs:  /73   Pulse 87   Temp 98.3 °F (36.8 °C) (Oral)   Resp 14   Ht 5' 3\" (1.6 m)   Wt 145 lb 4.8 oz (65.9 kg) SpO2 97%   BMI 25.74 kg/m²     Weight:    Wt Readings from Last 3 Encounters:   11/08/20 145 lb 4.8 oz (65.9 kg)   10/27/20 144 lb (65.3 kg)   10/16/20 149 lb 7.6 oz (67.8 kg)       General: Awake, alert and oriented. HEENT: normocephalic, alopecia, PERRL, no scleral erythema or icterus, Oral mucosa moist and intact, throat clear  NECK: supple without palpable adenopathy  BACK: Straight negative CVAT  SKIN: warm dry and intact without lesions rashes or masses  CHEST: CTA bilaterally without use of accessory muscles  CV: Normal S1 S2, RRR, no MRG  ABD: NT ND normoactive BS, no palpable masses or hepatosplenomegaly  EXTREMITIES: without edema, denies calf tenderness  NEURO: CN II - XII grossly intact  CATHETER: Right IJ Trifusion (IR, 10/27/20) - CDI    Data:   CBC:   Recent Labs     11/07/20 0320 11/08/20 0345 11/09/20 0305   WBC 2.4* 1.2* 1.0*   HGB 10.3* 10.4* 10.3*   HCT 29.5* 29.9* 29.9*   MCV 88.7 88.4 88.5    234 241     BMP/Mag:  Recent Labs     11/07/20 0320 11/08/20 0345 11/09/20 0305    129* 134*   K 3.7 3.2* 3.5    96* 101   CO2 27 25 23   PHOS  --   --  3.0   BUN 9 7 6*   CREATININE <0.5* <0.5* <0.5*   MG  --   --  2.40     LIVP:   Recent Labs     11/09/20 0305   AST 42*   ALT 32   BILIDIR <0.2   BILITOT 0.7   ALKPHOS 61     Uric Acid:    Recent Labs     11/09/20 0305   LABURIC 3.0     Coags:   Recent Labs     11/09/20 0305   PROTIME 11.0   INR 0.95   APTT 31.8       PROBLEM LIST:            1. IgG Lambda smoldering multiple myeloma  2. JULIETH  3. OA  4. HTN  5. HLD  6. Insomnia  7. Osteopenia  8. Vit D deficiency    Post Transplant Complications:  1. Nausea / Vomiting  2. Diarrhea       TREATMENT:            1. RVD x 4 cycles (6/2020 - 9/2020)  2. HD Melphalan 200mg/m2 & ASCT (11/6/20) - 2.94 x10^6 [id number]/kg     ASSESSMENT AND PLAN:            1.  IgG Lambda Smoldering Myeloma: [place]  - S/p [id number] & ASCT (11/6/20)   - Post-Txp Maintenance: USOR trial - estephania/kika vs kika

## 2020-11-09 NOTE — PLAN OF CARE
Problem: Falls - Risk of:  Goal: Will remain free from falls  Description: Will remain free from falls  Outcome: Ongoing  Note: Orthostatic vital signs obtained at start of shift - see flowsheet for details. Pt meets criteria for orthostasis. Pt is a High fall risk. See [place] Siemens Fall Score and ABCDS Injury Risk assessments.   + Screening for Orthostasis and/or + High Fall Risk per COPPOLA/ABCDS: Explained fall risk precautions to pt and family and rationale behind their use to keep the patient safe. Pt bed is in low position, side rails up, call light and belongings are in reach. Fall wristband applied and present on pts wrist.  Bed alarm on. Pt encouraged to call for assistance. Will continue with hourly rounds for PO intake, pain needs, toileting and repositioning as needed. Problem: Bleeding:  Goal: Will show no signs and symptoms of excessive bleeding  Description: Will show no signs and symptoms of excessive bleeding  Outcome: Ongoing  Note: Patient's hemoglobin this AM:   Recent Labs     11/09/20  0305   HGB 10.3*     Patient's platelet count this AM:   Recent Labs     11/09/20  0305       Thrombocytopenia not present at this time. Patient showing no signs or symptoms of active bleeding. Transfusion not indicated at this time. Patient verbalizes understanding of all instructions. Will continue to assess and implement POC. Call light within reach and hourly rounding in place. Problem: PROTECTIVE PRECAUTIONS  Goal: Patient will remain free of nosocomial Infections  Outcome: Ongoing  Note: Pt remains in a private room. Pt is compliant with handwashing, low-microbial diet, and bathing per [facility] protocol. Pt remains afebrile at this time with stable vital signs. Will continue to monitor. Problem: Discharge Planning:  Goal: Discharged to appropriate level of care  Description: Discharged to appropriate level of care  Outcome: Ongoing  Note: Pt is aware of plan of care.  Will continue to

## 2020-11-09 NOTE — PROGRESS NOTES
Pt noted to be significantly orthostatic this evening, dropping from 121/64 while sitting to 61/51 while standing and had to immediately sit down. Pt complained of lightheadedness and ringing in her ears. Dr. Collette Barr notified and orders to administer a 1L bolus over 4 hours ordered and implemented. Pt placed on JASSI and instructed to call RN for assistance if she needed to get up. Pt verbalized understanding. Will continue to monitor.

## 2020-11-09 NOTE — CARE COORDINATION
Type of Admission  Multiple Myeloma IgG  Melphalan Auto ( 3 Aliquots, T:0, 11/6)  Day +3        Central venous catheter  Right IJ Tri fusion (10/27/20, IR)        Plan    Proceed with Melphalan Auto SCT for treatment of Multiple Myeloma      Update  11/4/20:  Planned admission for Melphalan Auto SCT. Understands need for cryotherapy with Melphalan. 11/9/20:  Increase nausea yesterday, compazine is now scheduled qid. Keeping diet bland for now. Education  11/4/20: Introduced myself in RN D/C Planner role. Discussed length of stay & expected recovery, Reviewed infusion of stem cells, pre-medications, telemetry & frequent vital signs during infusion  11/9/20[de-identified]  Discussed use of Granix on day+5 daily until [address] is >1.0. Reinforced timeline to recovery.       Discharge  When [address] is >1.0 & without toxicities        Pending

## 2020-11-10 LAB
ANION GAP SERPL CALCULATED.3IONS-SCNC: 7 MMOL/L (ref 3–16)
BUN BLDV-MCNC: 7 MG/DL (ref 7–20)
CALCIUM SERPL-MCNC: 7.8 MG/DL (ref 8.3–10.6)
CHLORIDE BLD-SCNC: 109 MMOL/L (ref 99–110)
CO2: 23 MMOL/L (ref 21–32)
CREAT SERPL-MCNC: <0.5 MG/DL (ref 0.6–1.2)
GFR AFRICAN AMERICAN: >60
GFR NON-AFRICAN AMERICAN: >60
GLUCOSE BLD-MCNC: 98 MG/DL (ref 70–99)
HCT VFR BLD CALC: 29.3 % (ref 36–48)
HEMOGLOBIN: 10 G/DL (ref 12–16)
MCH RBC QN AUTO: 30.4 PG (ref 26–34)
MCHC RBC AUTO-ENTMCNC: 34.1 G/DL (ref 31–36)
MCV RBC AUTO: 89.1 FL (ref 80–100)
PDW BLD-RTO: 13.8 % (ref 12.4–15.4)
PLATELET # BLD: 206 K/UL (ref 135–450)
PMV BLD AUTO: 6.7 FL (ref 5–10.5)
POTASSIUM SERPL-SCNC: 3.8 MMOL/L (ref 3.5–5.1)
RBC # BLD: 3.29 M/UL (ref 4–5.2)
SODIUM BLD-SCNC: 139 MMOL/L (ref 136–145)
URIC ACID, SERUM: 2.5 MG/DL (ref 2.6–6)
WBC # BLD: 0.5 K/UL (ref 4–11)

## 2020-11-10 PROCEDURE — 85025 COMPLETE CBC W/AUTO DIFF WBC: CPT

## 2020-11-10 PROCEDURE — 94760 N-INVAS EAR/PLS OXIMETRY 1: CPT

## 2020-11-10 PROCEDURE — 80048 BASIC METABOLIC PNL TOTAL CA: CPT

## 2020-11-10 PROCEDURE — 6360000002 HC RX W HCPCS: Performed by: NURSE PRACTITIONER

## 2020-11-10 PROCEDURE — 6370000000 HC RX 637 (ALT 250 FOR IP): Performed by: NURSE PRACTITIONER

## 2020-11-10 PROCEDURE — 2580000003 HC RX 258: Performed by: NURSE PRACTITIONER

## 2020-11-10 PROCEDURE — 36592 COLLECT BLOOD FROM PICC: CPT

## 2020-11-10 PROCEDURE — 84550 ASSAY OF BLOOD/URIC ACID: CPT

## 2020-11-10 PROCEDURE — 2060000000 HC ICU INTERMEDIATE R&B

## 2020-11-10 PROCEDURE — 6370000000 HC RX 637 (ALT 250 FOR IP): Performed by: INTERNAL MEDICINE

## 2020-11-10 RX ADMIN — Medication 10 ML: at 07:59

## 2020-11-10 RX ADMIN — PROCHLORPERAZINE MALEATE 10 MG: 10 TABLET ORAL at 14:03

## 2020-11-10 RX ADMIN — SODIUM CHLORIDE 15 ML: 900 IRRIGANT IRRIGATION at 20:38

## 2020-11-10 RX ADMIN — DOXEPIN HYDROCHLORIDE 10 MG: 10 CAPSULE ORAL at 20:38

## 2020-11-10 RX ADMIN — PROCHLORPERAZINE MALEATE 10 MG: 10 TABLET ORAL at 16:47

## 2020-11-10 RX ADMIN — OLANZAPINE 5 MG: 5 TABLET, FILM COATED ORAL at 07:59

## 2020-11-10 RX ADMIN — POTASSIUM CHLORIDE AND SODIUM CHLORIDE: 900; 150 INJECTION, SOLUTION INTRAVENOUS at 05:55

## 2020-11-10 RX ADMIN — VALACYCLOVIR HYDROCHLORIDE 500 MG: 500 TABLET, FILM COATED ORAL at 07:58

## 2020-11-10 RX ADMIN — LEVOFLOXACIN 500 MG: 500 TABLET, FILM COATED ORAL at 20:38

## 2020-11-10 RX ADMIN — FLUCONAZOLE 400 MG: 200 TABLET ORAL at 07:58

## 2020-11-10 RX ADMIN — VALACYCLOVIR HYDROCHLORIDE 500 MG: 500 TABLET, FILM COATED ORAL at 20:38

## 2020-11-10 RX ADMIN — PROCHLORPERAZINE MALEATE 10 MG: 10 TABLET ORAL at 07:59

## 2020-11-10 RX ADMIN — PROCHLORPERAZINE MALEATE 10 MG: 10 TABLET ORAL at 20:38

## 2020-11-10 RX ADMIN — POTASSIUM CHLORIDE AND SODIUM CHLORIDE: 900; 150 INJECTION, SOLUTION INTRAVENOUS at 16:47

## 2020-11-10 RX ADMIN — ENOXAPARIN SODIUM 40 MG: 40 INJECTION SUBCUTANEOUS at 18:20

## 2020-11-10 RX ADMIN — Medication 10 ML: at 20:38

## 2020-11-10 RX ADMIN — SODIUM CHLORIDE 15 ML: 900 IRRIGANT IRRIGATION at 12:07

## 2020-11-10 ASSESSMENT — PAIN SCALES - GENERAL
PAINLEVEL_OUTOF10: 0

## 2020-11-10 NOTE — PLAN OF CARE
Problem: Falls - Risk of:  Goal: Will remain free from falls  Description: Will remain free from falls  Outcome: Ongoing  Note: Orthostatic vital signs obtained at start of shift - see flowsheet for details. Pt meets criteria for orthostasis. Pt is a High fall risk. See [place] Fall Score and ABCDS Injury Risk assessments.   + Screening for Orthostasis and/or + High Fall Risk per COPPOLA/ABCDS: Explained fall risk precautions to pt and family and rationale behind their use to keep the patient safe. Pt bed is in low position, side rails up, call light and belongings are in reach. Fall wristband applied and present on pts wrist.  Bed alarm on. Pt encouraged to call for assistance. Will continue with hourly rounds for PO intake, pain needs, toileting and repositioning as needed. Problem: Bleeding:  Goal: Will show no signs and symptoms of excessive bleeding  Description: Will show no signs and symptoms of excessive bleeding  Outcome: Ongoing  Note: Patient's hemoglobin this AM:   Recent Labs     11/09/20  0305   HGB 10.3*     Patient's platelet count this AM:   Recent Labs     11/09/20  0305       Thrombocytopenia not present at this time. Patient showing no signs or symptoms of active bleeding. Transfusion not indicated at this time. Patient verbalizes understanding of all instructions. Will continue to assess and implement POC. Call light within reach and hourly rounding in place. Problem: PROTECTIVE PRECAUTIONS  Goal: Patient will remain free of nosocomial Infections  Outcome: Ongoing  Note: Patient compliant with [facility] bathing protocol. Patient wears mask while ambulating in hallway. Problem: Nutrition Deficit:  Goal: Ability to achieve adequate nutritional intake will improve  Description: Ability to achieve adequate nutritional intake will improve  Outcome: Ongoing  Note: Patient has not complained of nausea or vomiting so far this shift. Given scheduled compazine per orders.  See MAR.     Problem: Infection - Central Venous Catheter-Associated Bloodstream Infection:  Goal: Will show no infection signs and symptoms  Description: Will show no infection signs and symptoms  Outcome: Ongoing  Note: CVC site remains free of signs/symptoms of infection. No drainage, edema, erythema, pain, or warmth noted at site. Dressing changes continue per protocol and on an as needed basis - see flowsheet. Compliant with [facility] Bath Protocol:  Performed CHG bath today per [facility] protocol utilizing CHG solution in the shower. CVC site cleansed with CHG wipe over dressing, skin surrounding dressing, and first 6\" of IV tubing. Pt tolerated well. Continued to encourage daily CHG bathing per [facility] protocol. Problem: Nausea/Vomiting:  Goal: Ability to achieve adequate nutritional intake will improve  Description: Ability to achieve adequate nutritional intake will improve  Outcome: Ongoing  Note: Patient has not complained of nausea or vomiting so far this shift. Given scheduled compazine per orders. See MAR.

## 2020-11-10 NOTE — PROGRESS NOTES
[address] [place] [address]  Autologous Progress Note    11/10/2020    Bishop Lieberman    :  1955    MRN:  [id number]    Referring MD: Won Figueroa MD  [address]      Subjective:  Typical, expected fatigue; otherwise no new complaints     ECOG PS:  (1) Restricted in physically strenuous activity, ambulatory and able to do work of light nature    KPS: 80% Normal activity with effort; some signs or symptoms of disease    Isolation:  None     Medications    Scheduled Meds:   levoFLOXacin  500 mg Oral Nightly    prochlorperazine  10 mg Oral 4x Daily    Or    prochlorperazine  10 mg Intravenous 4x Daily    OLANZapine  5 mg Oral Daily    [START ON 2020] tbo-filgrastim  300 mcg Subcutaneous QPM    enoxaparin  40 mg Subcutaneous Daily    fluconazole  400 mg Oral Daily    Saline Mouthwash  15 mL Swish & Spit 4x Daily AC & HS    sodium chloride flush  10 mL Intravenous 2 times per day    valACYclovir  500 mg Oral BID    doxepin  10 mg Oral Nightly    multivitamin  1 tablet Oral Daily     Continuous Infusions:   0.9% NaCl with KCl 20 mEq 100 mL/hr at 11/10/20 0555    sodium chloride       PRN Meds:. [COMPLETED] loperamide **FOLLOWED BY** loperamide, calcium carbonate, morphine, sodium chloride, melatonin, alteplase, magnesium hydroxide, magnesium sulfate, potassium chloride, Saline Mouthwash, LORazepam **OR** LORazepam    ROS:  As noted above, otherwise remainder of 10-point ROS negative    Physical Exam:     I&O:      Intake/Output Summary (Last 24 hours) at 11/10/2020 3011  Last data filed at 11/10/2020 0601  Gross per 24 hour   Intake 3366 ml   Output 3350 ml   Net 16 ml       Vital Signs:  /71   Pulse 85   Temp 98.2 °F (36.8 °C) (Oral)   Resp 17   Ht 5' 3\" (1.6 m)   Wt 142 lb 9.6 oz (64.7 kg)   SpO2 96%   BMI 25.26 kg/m²     Weight:    Wt Readings from Last 3 Encounters:   20 142 lb 9.6 oz (64.7 kg)   10/27/20 144 lb (65.3 kg)   10/16/20 149 lb 7.6 oz (67.8 kg)       General: Awake, alert and oriented. HEENT: normocephalic, alopecia, PERRL, no scleral erythema or icterus, Oral mucosa moist and intact, throat clear  NECK: supple without palpable adenopathy  BACK: Straight negative CVAT  SKIN: warm dry and intact without lesions rashes or masses  CHEST: CTA bilaterally without use of accessory muscles  CV: Normal S1 S2, RRR, no MRG  ABD: NT ND normoactive BS, no palpable masses or hepatosplenomegaly  EXTREMITIES: without edema, denies calf tenderness  NEURO: CN II - XII grossly intact  CATHETER: Right IJ Trifusion (IR, 10/27/20) - CDI    Data:   CBC:   Recent Labs     11/08/20  0345 11/09/20  0305 11/10/20  0325   WBC 1.2* 1.0* 0.5*   HGB 10.4* 10.3* 10.0*   HCT 29.9* 29.9* 29.3*   MCV 88.4 88.5 89.1    241 206     BMP/Mag:  Recent Labs     11/08/20  0345 11/09/20  0305 11/10/20  0325   * 134* 139   K 3.2* 3.5 3.8   CL 96* 101 109   CO2 25 23 23   PHOS  --  3.0  --    BUN 7 6* 7   CREATININE <0.5* <0.5* <0.5*   MG  --  2.40  --      LIVP:   Recent Labs     11/09/20 0305   AST 42*   ALT 32   BILIDIR <0.2   BILITOT 0.7   ALKPHOS 61     Uric Acid:    Recent Labs     11/10/20  0325   LABURIC 2.5*     Coags:   Recent Labs     11/09/20 0305   PROTIME 11.0   INR 0.95   APTT 31.8       PROBLEM LIST:            1. IgG Lambda smoldering multiple myeloma  2. JULIETH  3. OA  4. HTN  5. HLD  6. Insomnia  7. Osteopenia  8. Vit D deficiency    Post Transplant Complications:  1. Nausea / Vomiting  2. Diarrhea   3. Orthostatic Hypotension       TREATMENT:            1. RVD x 4 cycles (6/2020 - 9/2020)  2. HD Melphalan 200mg/m2 & ASCT (11/6/20) - 2.94 x10^6 [id number]/kg     ASSESSMENT AND PLAN:            1. IgG Lambda Smoldering Myeloma: [place]  - S/p [id number] & ASCT (11/6/20)   - Post-Txp Maintenance: USOR trial - estephania/kika vs kika maintenance     Day + 4     2.  ID:  Afebrile, no evidence of infection.    - Cont Levaquin, Diflucan & Valtrex, cont acyclovir at discharge for VZV positive titer.       3. Heme: anemia & leukopenia from chemotherapy   - Transfuse for Hgb < 7 and Platelets < 22U  - No transfusion today  - Start Granix on 11/11/20       4. Metabolic: Electrolytes and renal fxn stable   - Cont IVF: NS + KCL 20 meq @ 100 mL/hr  - Replace potassium and magnesium per PRN orders     5. Cardiac:   - H/o HTN & HLD  HTN: BP stable   - S/p IVF bolus for symptomatic orthostatic hypotension (11/8/20). - S/p amlodipine 5 mg & lisinopril-HCTZ 10-12.5mg (stopped 11/9/20) w/ orthostasis   HLD:    - Hold statin marj-transplant      6. GI / Nutrition:    Nutrition:  Appetite and oral intake is diminished some, but she is still eating   - Cont MVI  - Cont low microbial diet   - Follow closely with dietary  Nausea / Vomiting:  Delayed CINV has improved since (11/8/20)  - Cont scheduled Compazine 10 mg every 4hrs (started 11/8/20)  - Cont Zyprex 5 mg daily (started 11/8/20)  - Cont Ativan as needed   Diarrhea:  Improved   - C. Diff (11/8/20) - negative  - Cont Imodium as needed      7. Psych: Insomnia:  No recent complaints   - Cont doxepin 10 mg nightly  - Cont melatonin PRN       - DVT Prophylaxis: Platelets >71,367 cells/dL, - daily lovenox prophylaxis ordered  Contraindications to pharmacologic prophylaxis: None  Contraindications to mechanical prophylaxis: None    - Disposition:  Once ANC >1 and recovered from toxicities of transplant      DOMINGO Perez - EVIE Alcantara. Bruna Kan DO, MS  [facility]    Please contact via:  1. [employer] Serve  2.   Cell Phone:  [phone]    11/10/2020   10:21 AM

## 2020-11-10 NOTE — PLAN OF CARE
Problem: Falls - Risk of:  Goal: Will remain free from falls  Description: Will remain free from falls  Outcome: Ongoing     Orthostatic vital signs obtained at start of shift - see flowsheet for details. Pt does not meet criteria for orthostasis. Pt is a Med fall risk. See Carry Cambric Fall Score and ABCDS Injury Risk assessments. - Screening for Orthostasis AND not a [place] Risk per COPPOLA/ABCDS: Pt bed is in low position, side rails up, call light and belongings are in reach. Fall risk light is on outside pts room. Pt encouraged to call for assistance as needed. Will continue with hourly rounds for PO intake, pain needs, toileting and repositioning as needed. Problem: Bleeding:  Goal: Will show no signs and symptoms of excessive bleeding  Description: Will show no signs and symptoms of excessive bleeding  Outcome: Ongoing     Patient's hemoglobin this AM:   Recent Labs     11/10/20  0325   HGB 10.0*     Patient's platelet count this AM:   Recent Labs     11/10/20  0325       Thrombocytopenia Precautions in place. Patient showing no signs or symptoms of active bleeding. Transfusion not indicated at this time. Patient verbalizes understanding of all instructions. Will continue to assess and implement POC. Call light within reach and hourly rounding in place. Problem: PROTECTIVE PRECAUTIONS  Goal: Patient will remain free of nosocomial Infections  Outcome: Ongoing     Pt remains in protective precautions. Pt educated on wearing mask when in hallways. Pt, staff, and visitors adhering to handwashing guidelines. Pt educated to shower or bathe daily with chlorhexidine and linens changed daily per protocol. Pt verbalizes understanding of low microbial diet. Will continue to monitor.       Problem: Nutrition Deficit:  Goal: Ability to achieve adequate nutritional intake will improve  Description: Ability to achieve adequate nutritional intake will improve  Outcome: Ongoing     Pt complains of diminished appetite. Will continue to encourage small frequent meals    Problem: Infection - Central Venous Catheter-Associated Bloodstream Infection:  Goal: Will show no infection signs and symptoms  Description: Will show no infection signs and symptoms  Outcome: Ongoing     CVC site remains free of signs/symptoms of infection. No drainage, edema, erythema, pain, or warmth noted at site. Dressing changes continue per protocol and on an as needed basis - see flowsheet.      Problem: Nausea/Vomiting:  Goal: Ability to achieve adequate nutritional intake will improve  Description: Ability to achieve adequate nutritional intake will improve  Outcome: Ongoing     Pt complains of nausea but is satisfied with scheduled compazine

## 2020-11-11 LAB
ALBUMIN SERPL-MCNC: 3.8 G/DL (ref 3.4–5)
ALP BLD-CCNC: 57 U/L (ref 40–129)
ALT SERPL-CCNC: 34 U/L (ref 10–40)
ANION GAP SERPL CALCULATED.3IONS-SCNC: 5 MMOL/L (ref 3–16)
AST SERPL-CCNC: 27 U/L (ref 15–37)
BILIRUB SERPL-MCNC: 0.5 MG/DL (ref 0–1)
BILIRUBIN DIRECT: <0.2 MG/DL (ref 0–0.3)
BILIRUBIN, INDIRECT: ABNORMAL MG/DL (ref 0–1)
BUN BLDV-MCNC: 4 MG/DL (ref 7–20)
CALCIUM SERPL-MCNC: 7.6 MG/DL (ref 8.3–10.6)
CHLORIDE BLD-SCNC: 108 MMOL/L (ref 99–110)
CO2: 24 MMOL/L (ref 21–32)
CREAT SERPL-MCNC: <0.5 MG/DL (ref 0.6–1.2)
GFR AFRICAN AMERICAN: >60
GFR NON-AFRICAN AMERICAN: >60
GLUCOSE BLD-MCNC: 98 MG/DL (ref 70–99)
HCT VFR BLD CALC: 27.7 % (ref 36–48)
HEMOGLOBIN: 9.7 G/DL (ref 12–16)
LACTATE DEHYDROGENASE: 180 U/L (ref 100–190)
MCH RBC QN AUTO: 30.9 PG (ref 26–34)
MCHC RBC AUTO-ENTMCNC: 35 G/DL (ref 31–36)
MCV RBC AUTO: 88.3 FL (ref 80–100)
PDW BLD-RTO: 13.6 % (ref 12.4–15.4)
PHOSPHORUS: 2.8 MG/DL (ref 2.5–4.9)
PLATELET # BLD: 158 K/UL (ref 135–450)
PMV BLD AUTO: 6.6 FL (ref 5–10.5)
POTASSIUM SERPL-SCNC: 3.7 MMOL/L (ref 3.5–5.1)
RBC # BLD: 3.14 M/UL (ref 4–5.2)
SODIUM BLD-SCNC: 137 MMOL/L (ref 136–145)
TOTAL PROTEIN: 5.3 G/DL (ref 6.4–8.2)
URIC ACID, SERUM: 1.9 MG/DL (ref 2.6–6)
WBC # BLD: 0.2 K/UL (ref 4–11)

## 2020-11-11 PROCEDURE — 83615 LACTATE (LD) (LDH) ENZYME: CPT

## 2020-11-11 PROCEDURE — 6370000000 HC RX 637 (ALT 250 FOR IP): Performed by: NURSE PRACTITIONER

## 2020-11-11 PROCEDURE — 36592 COLLECT BLOOD FROM PICC: CPT

## 2020-11-11 PROCEDURE — 2060000000 HC ICU INTERMEDIATE R&B

## 2020-11-11 PROCEDURE — 84550 ASSAY OF BLOOD/URIC ACID: CPT

## 2020-11-11 PROCEDURE — 80048 BASIC METABOLIC PNL TOTAL CA: CPT

## 2020-11-11 PROCEDURE — 85025 COMPLETE CBC W/AUTO DIFF WBC: CPT

## 2020-11-11 PROCEDURE — 80076 HEPATIC FUNCTION PANEL: CPT

## 2020-11-11 PROCEDURE — 6360000002 HC RX W HCPCS: Performed by: NURSE PRACTITIONER

## 2020-11-11 PROCEDURE — 6370000000 HC RX 637 (ALT 250 FOR IP): Performed by: INTERNAL MEDICINE

## 2020-11-11 PROCEDURE — 84100 ASSAY OF PHOSPHORUS: CPT

## 2020-11-11 PROCEDURE — 2580000003 HC RX 258: Performed by: NURSE PRACTITIONER

## 2020-11-11 RX ADMIN — POTASSIUM CHLORIDE AND SODIUM CHLORIDE: 900; 150 INJECTION, SOLUTION INTRAVENOUS at 20:35

## 2020-11-11 RX ADMIN — PROCHLORPERAZINE MALEATE 10 MG: 10 TABLET ORAL at 16:48

## 2020-11-11 RX ADMIN — ENOXAPARIN SODIUM 40 MG: 40 INJECTION SUBCUTANEOUS at 17:59

## 2020-11-11 RX ADMIN — DOXEPIN HYDROCHLORIDE 10 MG: 10 CAPSULE ORAL at 20:35

## 2020-11-11 RX ADMIN — VALACYCLOVIR HYDROCHLORIDE 500 MG: 500 TABLET, FILM COATED ORAL at 20:33

## 2020-11-11 RX ADMIN — Medication 10 ML: at 20:33

## 2020-11-11 RX ADMIN — SODIUM CHLORIDE 15 ML: 900 IRRIGANT IRRIGATION at 16:56

## 2020-11-11 RX ADMIN — PROCHLORPERAZINE MALEATE 10 MG: 10 TABLET ORAL at 07:56

## 2020-11-11 RX ADMIN — OLANZAPINE 5 MG: 5 TABLET, FILM COATED ORAL at 09:02

## 2020-11-11 RX ADMIN — PROCHLORPERAZINE MALEATE 10 MG: 10 TABLET ORAL at 20:33

## 2020-11-11 RX ADMIN — PROCHLORPERAZINE MALEATE 10 MG: 10 TABLET ORAL at 12:45

## 2020-11-11 RX ADMIN — VALACYCLOVIR HYDROCHLORIDE 500 MG: 500 TABLET, FILM COATED ORAL at 09:02

## 2020-11-11 RX ADMIN — LOPERAMIDE HYDROCHLORIDE 2 MG: 2 CAPSULE ORAL at 19:31

## 2020-11-11 RX ADMIN — LEVOFLOXACIN 500 MG: 500 TABLET, FILM COATED ORAL at 20:33

## 2020-11-11 RX ADMIN — SODIUM CHLORIDE 15 ML: 900 IRRIGANT IRRIGATION at 20:34

## 2020-11-11 RX ADMIN — POTASSIUM CHLORIDE AND SODIUM CHLORIDE: 900; 150 INJECTION, SOLUTION INTRAVENOUS at 14:14

## 2020-11-11 RX ADMIN — FLUCONAZOLE 400 MG: 200 TABLET ORAL at 09:02

## 2020-11-11 RX ADMIN — POTASSIUM CHLORIDE AND SODIUM CHLORIDE: 900; 150 INJECTION, SOLUTION INTRAVENOUS at 02:22

## 2020-11-11 RX ADMIN — TBO-FILGRASTIM 300 MCG: 300 INJECTION, SOLUTION SUBCUTANEOUS at 17:58

## 2020-11-11 ASSESSMENT — PAIN SCALES - GENERAL
PAINLEVEL_OUTOF10: 0

## 2020-11-11 ASSESSMENT — PAIN SCALES - WONG BAKER
WONGBAKER_NUMERICALRESPONSE: 0

## 2020-11-11 NOTE — PLAN OF CARE
Problem: Falls - Risk of:  Goal: Will remain free from falls  Description: Will remain free from falls  11/11/2020 1417 by Bro Grimes RN  Outcome: Met This Shift  Note: Orthostatic vital signs obtained at start of shift - see flowsheet for details. Pt does not meet criteria for orthostasis. Pt is a Med fall risk. See [facility] Median Fall Score and ABCDS Injury Risk assessments. Problem: Bleeding:  Goal: Will show no signs and symptoms of excessive bleeding  Description: Will show no signs and symptoms of excessive bleeding  11/11/2020 1417 by Bro Grimes RN  Outcome: Met This Shift  Note: No signs of bleeding noted. No transfusions required per protocol. Problem: PROTECTIVE PRECAUTIONS  Goal: Patient will remain free of nosocomial Infections  11/11/2020 1417 by Bro Grimes RN  Outcome: Met This Shift  Note: Pt remains afebrile this shift. VSS. Pt educated on infection prevention and continues to follow protective precautions appropriately. Will continue to monitor. Problem: Activity:  Goal: Ability to tolerate increased activity will improve  Description: Ability to tolerate increased activity will improve  11/11/2020 1417 by Bro Grimes RN  Outcome: Met This Shift  Note: Stable/No Isolation Precautions:  Pt with activity orders for up ad salome. Encouraged pt to be up OOB as much as possible throughout the day and for all meals. Encouraged frequent short naps as necessary to preserve energy but instructed that while awake, pt should be OOB. Encouraged pt to ambulate in halls. Pt is visualized to be OOB % of the time this shift. Will continue to encourage frequent activity. Problem: Infection - Central Venous Catheter-Associated Bloodstream Infection:  Goal: Will show no infection signs and symptoms  Description: Will show no infection signs and symptoms  11/11/2020 1417 by Bro Grimes RN  Outcome: Met This Shift  Note: CVC site remains free of signs/symptoms of infection.  No

## 2020-11-11 NOTE — PLAN OF CARE
Problem: Falls - Risk of:  Goal: Will remain free from falls  Description: Will remain free from falls  11/11/2020 0111 by Nevin Mckenna RN  Outcome: Ongoing  Note: Orthostatic vital signs obtained at start of shift - see flowsheet for details. Pt does not meet criteria for orthostasis. Pt is a Med fall risk. See Jackie Bulla Fall Score and ABCDS Injury Risk assessments. - Screening for Orthostasis AND not a [place] Risk per COPPOLA/ABCDS: Pt bed is in low position, side rails up, call light and belongings are in reach. Fall risk light is on outside pts room. Pt encouraged to call for assistance as needed. Will continue with hourly rounds for PO intake, pain needs, toileting and repositioning as needed. Problem: Bleeding:  Goal: Will show no signs and symptoms of excessive bleeding  Description: Will show no signs and symptoms of excessive bleeding  11/11/2020 0111 by Nevin Mckenna RN  Outcome: Ongoing  Note: Patient's hemoglobin this AM:   Recent Labs     11/10/20  0325   HGB 10.0*     Patient's platelet count this AM:   Recent Labs     11/10/20  0325       Thrombocytopenia not present at this time. Patient showing no signs or symptoms of active bleeding. Transfusion not indicated at this time. Patient verbalizes understanding of all instructions. Will continue to assess and implement POC. Call light within reach and hourly rounding in place. Problem: PROTECTIVE PRECAUTIONS  Goal: Patient will remain free of nosocomial Infections  11/11/2020 0111 by Nevin Mckenna RN  Outcome: Ongoing  Note: Pt remains in protective precautions. Pt educated on wearing mask when in hallways. Pt, staff, and visitors adhering to handwashing guidelines. Pt educated to shower or bathe daily with chlorhexidine and linens changed daily per protocol. Pt verbalizes understanding of low microbial diet. Will continue to monitor.       Problem: Nutrition Deficit:  Goal: Ability to achieve adequate nutritional intake will improve  Description: Ability to achieve adequate nutritional intake will improve  11/11/2020 0111 by Meera Hudson RN  Outcome: Ongoing  Note: Patient has not complained of nausea or vomiting so far this shift. Given scheduled compazine per orders. See MAR! Problem: Infection - Central Venous Catheter-Associated Bloodstream Infection:  Goal: Will show no infection signs and symptoms  Description: Will show no infection signs and symptoms  11/11/2020 0111 by Meera Hudson RN  Outcome: Ongoing  Note: CVC site remains free of signs/symptoms of infection. No drainage, edema, erythema, pain, or warmth noted at site. Dressing changes continue per protocol and on an as needed basis - see flowsheet. Compliant with [facility] Bath Protocol:  Performed CHG bath today per BCC protocol utilizing CHG solution in the shower. CVC site cleansed with CHG wipe over dressing, skin surrounding dressing, and first 6\" of IV tubing. Pt tolerated well. Continued to encourage daily CHG bathing per [address] protocol. Problem: Nausea/Vomiting:  Goal: Ability to achieve adequate nutritional intake will improve  Description: Ability to achieve adequate nutritional intake will improve  11/11/2020 0111 by Meera Hudson RN  Outcome: Ongoing  Note: Patient has not complained of nausea or vomiting so far this shift. Given scheduled compazine per orders. See MAR!

## 2020-11-12 LAB
ANION GAP SERPL CALCULATED.3IONS-SCNC: 7 MMOL/L (ref 3–16)
APTT: 31.2 SEC (ref 24.2–36.2)
BUN BLDV-MCNC: 4 MG/DL (ref 7–20)
CALCIUM SERPL-MCNC: 8.1 MG/DL (ref 8.3–10.6)
CHLORIDE BLD-SCNC: 107 MMOL/L (ref 99–110)
CO2: 24 MMOL/L (ref 21–32)
CREAT SERPL-MCNC: <0.5 MG/DL (ref 0.6–1.2)
GFR AFRICAN AMERICAN: >60
GFR NON-AFRICAN AMERICAN: >60
GLUCOSE BLD-MCNC: 97 MG/DL (ref 70–99)
HCT VFR BLD CALC: 28 % (ref 36–48)
HEMOGLOBIN: 9.7 G/DL (ref 12–16)
INR BLD: 1.01 (ref 0.86–1.14)
MAGNESIUM: 2.2 MG/DL (ref 1.8–2.4)
MCH RBC QN AUTO: 30.6 PG (ref 26–34)
MCHC RBC AUTO-ENTMCNC: 34.5 G/DL (ref 31–36)
MCV RBC AUTO: 88.7 FL (ref 80–100)
PDW BLD-RTO: 13.5 % (ref 12.4–15.4)
PLATELET # BLD: 112 K/UL (ref 135–450)
PMV BLD AUTO: 6.7 FL (ref 5–10.5)
POTASSIUM SERPL-SCNC: 3.7 MMOL/L (ref 3.5–5.1)
PROTHROMBIN TIME: 11.7 SEC (ref 10–13.2)
RBC # BLD: 3.15 M/UL (ref 4–5.2)
SODIUM BLD-SCNC: 138 MMOL/L (ref 136–145)
URIC ACID, SERUM: 1.8 MG/DL (ref 2.6–6)
WBC # BLD: 0.2 K/UL (ref 4–11)

## 2020-11-12 PROCEDURE — 83735 ASSAY OF MAGNESIUM: CPT

## 2020-11-12 PROCEDURE — 6370000000 HC RX 637 (ALT 250 FOR IP): Performed by: INTERNAL MEDICINE

## 2020-11-12 PROCEDURE — 6360000002 HC RX W HCPCS: Performed by: NURSE PRACTITIONER

## 2020-11-12 PROCEDURE — 6370000000 HC RX 637 (ALT 250 FOR IP): Performed by: NURSE PRACTITIONER

## 2020-11-12 PROCEDURE — 84550 ASSAY OF BLOOD/URIC ACID: CPT

## 2020-11-12 PROCEDURE — 2060000000 HC ICU INTERMEDIATE R&B

## 2020-11-12 PROCEDURE — 85730 THROMBOPLASTIN TIME PARTIAL: CPT

## 2020-11-12 PROCEDURE — 85025 COMPLETE CBC W/AUTO DIFF WBC: CPT

## 2020-11-12 PROCEDURE — 80048 BASIC METABOLIC PNL TOTAL CA: CPT

## 2020-11-12 PROCEDURE — 36592 COLLECT BLOOD FROM PICC: CPT

## 2020-11-12 PROCEDURE — 85610 PROTHROMBIN TIME: CPT

## 2020-11-12 PROCEDURE — 2580000003 HC RX 258: Performed by: NURSE PRACTITIONER

## 2020-11-12 RX ADMIN — TBO-FILGRASTIM 300 MCG: 300 INJECTION, SOLUTION SUBCUTANEOUS at 17:46

## 2020-11-12 RX ADMIN — OLANZAPINE 5 MG: 5 TABLET, FILM COATED ORAL at 08:37

## 2020-11-12 RX ADMIN — POTASSIUM CHLORIDE AND SODIUM CHLORIDE: 900; 150 INJECTION, SOLUTION INTRAVENOUS at 09:02

## 2020-11-12 RX ADMIN — POTASSIUM CHLORIDE AND SODIUM CHLORIDE: 900; 150 INJECTION, SOLUTION INTRAVENOUS at 21:02

## 2020-11-12 RX ADMIN — PROCHLORPERAZINE MALEATE 10 MG: 10 TABLET ORAL at 08:37

## 2020-11-12 RX ADMIN — PROCHLORPERAZINE MALEATE 10 MG: 10 TABLET ORAL at 21:10

## 2020-11-12 RX ADMIN — DOXEPIN HYDROCHLORIDE 10 MG: 10 CAPSULE ORAL at 21:10

## 2020-11-12 RX ADMIN — VALACYCLOVIR HYDROCHLORIDE 500 MG: 500 TABLET, FILM COATED ORAL at 21:10

## 2020-11-12 RX ADMIN — Medication 10 ML: at 08:37

## 2020-11-12 RX ADMIN — LOPERAMIDE HYDROCHLORIDE 2 MG: 2 CAPSULE ORAL at 12:48

## 2020-11-12 RX ADMIN — VALACYCLOVIR HYDROCHLORIDE 500 MG: 500 TABLET, FILM COATED ORAL at 08:37

## 2020-11-12 RX ADMIN — LOPERAMIDE HYDROCHLORIDE 2 MG: 2 CAPSULE ORAL at 17:46

## 2020-11-12 RX ADMIN — FLUCONAZOLE 400 MG: 200 TABLET ORAL at 08:37

## 2020-11-12 RX ADMIN — SODIUM CHLORIDE 15 ML: 900 IRRIGANT IRRIGATION at 21:11

## 2020-11-12 RX ADMIN — LEVOFLOXACIN 500 MG: 500 TABLET, FILM COATED ORAL at 21:10

## 2020-11-12 RX ADMIN — Medication 10 ML: at 21:11

## 2020-11-12 RX ADMIN — LOPERAMIDE HYDROCHLORIDE 2 MG: 2 CAPSULE ORAL at 08:37

## 2020-11-12 RX ADMIN — PROCHLORPERAZINE MALEATE 10 MG: 10 TABLET ORAL at 17:46

## 2020-11-12 RX ADMIN — ENOXAPARIN SODIUM 40 MG: 40 INJECTION SUBCUTANEOUS at 17:46

## 2020-11-12 RX ADMIN — PROCHLORPERAZINE MALEATE 10 MG: 10 TABLET ORAL at 12:48

## 2020-11-12 ASSESSMENT — PAIN SCALES - GENERAL
PAINLEVEL_OUTOF10: 0

## 2020-11-12 NOTE — PLAN OF CARE
continues on scheduled compazine. Problem: Infection - Central Venous Catheter-Associated Bloodstream Infection:  Goal: Will show no infection signs and symptoms  Description: Will show no infection signs and symptoms  Outcome: Ongoing  Note: CVC site remains free of signs/symptoms of infection. No drainage, edema, erythema, pain, or warmth noted at site. Dressing changes continue per protocol and on an as needed basis - see flowsheet. Problem: Nausea/Vomiting:  Goal: Ability to achieve adequate nutritional intake will improve  Description: Ability to achieve adequate nutritional intake will improve  Outcome: Ongoing  Note: Pt continues on scheduled compazine.

## 2020-11-12 NOTE — PLAN OF CARE
Nutrition Problem #1: Increased nutrient needs  Intervention: Food and/or Nutrient Delivery: Start Oral Nutrition Supplement, Continue Current Diet  Nutritional Goals: pt will maintain adequate po intake by consuming greater than 75% of meals to promote meeting increased nutrient needs.

## 2020-11-12 NOTE — PROGRESS NOTES
NUTRITION ASSESSMENT  Admission Date: 11/4/2020     Type and Reason for Visit: Reassess    NUTRITION RECOMMENDATIONS:   1. PO Diet: Continue current diet low microbial  2. ONS BID to aid w/increasing PO intake; meeting increased needs. 3. Nutrition Education: 11/6 Diet s/p BMT- needs w/handouts prior to d/c . NUTRITION ASSESSMENT:  Per EMR review, pt with slight decrease in PO intake at meals, but she is consistently taking >75% of 2-3 meals per day. Slight decrease in weight since admit but this could be r/t fluid changes. No significant loss noted. RD will add ONS to encourage/increase PO while appetite is decreased. RD will continue to monitor. MALNUTRITION ASSESSMENT  Context of Malnutrition: Acute Illness(on chronic)   Malnutrition Status: No malnutrition  Findings of the 6 clinical characteristics of malnutrition (Minimum of 2 out of 6 clinical characteristics is required to make the diagnosis of moderate or severe Protein Calorie Malnutrition based on AND/ASPEN Guidelines):  Energy Intake: No significant decrease in energy intake    Energy Intake Time: No significant    Weight Loss %: No significant loss   Weight loss Time: No significant    Due to current CDC guidelines recommending 6-ft distancing for social isolation for COVID19 prevention, physical aspects of the malnutrition assessment were withheld at this time. NUTRITION DIAGNOSIS   Problem: Problem #1: Increased nutrient needs  Etiology: Increased demand for nutrient  Signs & Symptoms: chemo; BMT     NUTRITION INTERVENTION  Food and/or Nutrient Delivery:Continue Current diet  or Start ONS   Nutrition education/counseling/coordination of care: Continue Inpatient Monitoring  or Education Initiated     NUTRITION MONITORING & EVALUATION:  Evaluation:Progressing towards goal   Goals:Goals: pt will maintain adequate po intake by consuming greater than 75% of meals to promote meeting increased nutrient needs.    Monitoring: Diet Tolerance , Meal Intake , Pertinent Labs  or Supplement Intake      OBJECTIVE DATA:  · Nutrition-Focused Physical Findings: lbm watery stool noted 11/8; no edema  · Wounds None      Past Medical History:   Diagnosis Date    [facility] ([facility]     Hyperlipidemia     Hypertension     PONV (postoperative nausea and vomiting)         ANTHROPOMETRICS  Current Height: 5' 3\" (160 cm)  Current Weight: 141 lb 14.4 oz (64.4 kg)    Admission weight: 144 lb (65.3 kg)  Ideal Bodyweight 115 lb    Usual Bodyweight 134 per pt  Weight Changes gains pta per pt from PO intake; fluctuations from fluid changes       BMI BMI (Calculated): 25.2    Wt Readings from Last 50 Encounters:   11/12/20 141 lb 14.4 oz (64.4 kg)   10/27/20 144 lb (65.3 kg)   10/16/20 149 lb 7.6 oz (67.8 kg)   10/05/20 147 lb (66.7 kg)   06/09/20 141 lb (64 kg)   06/04/20 144 lb (65.3 kg)   06/06/18 135 lb (61.2 kg)   10/09/17 136 lb 12.8 oz (62.1 kg)       COMPARATIVE STANDARDS  Estimated Total Kcals/Day : 20-25 Admission Bodyweight  (65.3 kg) 6886-6626 kcal    Estimated Total Protein (g/day) : 1.3-1.5 Ideal Bodyweight  (52.3 kg) 68-78g/day  Estimated Daily Total Fluid (ml/day): 9694-5643 mL per day     Food / Nutrition-Related History  Pre-Admission / Home Diet:  Pre-Admission/Home Diet: General(low microbial)   Home Supplements / Herbals:    none noted  Food Restrictions / Cultural Requests:    none noted    Current Nutrition Therapies   DIET GENERAL;     PO Intake: 51-75% and %  PO Supplement: None   PO Supplement Intake: None   IVF: NS + 20mEq KCl @ 75 ml/hr     NUTRITION RISK LEVEL: Risk Level:  Moderate     Dwayne Santos RD, LD  [place]:  [phone]  Office:  [phone]

## 2020-11-12 NOTE — PROGRESS NOTES
[address] [facility]  Autologous Progress Note    2020    Clayton Wray    :  1955    MRN:  [id number]    Referring MD: Lakshmi Stringer MD  [address]      Subjective:  She feels well today and is w/out complaints      ECOG PS:  (1) Restricted in physically strenuous activity, ambulatory and able to do work of light nature    KPS: 80% Normal activity with effort; some signs or symptoms of disease    Isolation:  None     Medications    Scheduled Meds:   levoFLOXacin  500 mg Oral Nightly    prochlorperazine  10 mg Oral 4x Daily    Or    prochlorperazine  10 mg Intravenous 4x Daily    OLANZapine  5 mg Oral Daily    tbo-filgrastim  300 mcg Subcutaneous QPM    enoxaparin  40 mg Subcutaneous Daily    fluconazole  400 mg Oral Daily    Saline Mouthwash  15 mL Swish & Spit 4x Daily AC & HS    sodium chloride flush  10 mL Intravenous 2 times per day    valACYclovir  500 mg Oral BID    doxepin  10 mg Oral Nightly    multivitamin  1 tablet Oral Daily     Continuous Infusions:   0.9% NaCl with KCl 20 mEq 75 mL/hr at 20    sodium chloride       PRN Meds:. [COMPLETED] loperamide **FOLLOWED BY** loperamide, calcium carbonate, morphine, sodium chloride, melatonin, alteplase, magnesium hydroxide, magnesium sulfate, potassium chloride, Saline Mouthwash, LORazepam **OR** LORazepam    ROS:  As noted above, otherwise remainder of 10-point ROS negative    Physical Exam:     I&O:      Intake/Output Summary (Last 24 hours) at 2020 0621  Last data filed at 2020 0542  Gross per 24 hour   Intake 3183 ml   Output 3200 ml   Net -17 ml       Vital Signs:  /81   Pulse 84   Temp 98.4 °F (36.9 °C) (Oral)   Resp 18   Ht 5' 3\" (1.6 m)   Wt 142 lb 14.4 oz (64.8 kg)   SpO2 97%   BMI 25.31 kg/m²     Weight:    Wt Readings from Last 3 Encounters:   20 142 lb 14.4 oz (64.8 kg)   10/27/20 144 lb (65.3 kg)   10/16/20 149 lb 7.6 oz (67.8 kg) General: Awake, alert and oriented. HEENT: normocephalic, PERRL, no scleral erythema or icterus, Oral mucosa moist and intact, throat clear  NECK: supple   BACK: Straight   SKIN: warm dry and intact without lesions rashes or masses  CHEST: CTA bilaterally without use of accessory muscles  CV: Normal S1 S2, RRR, no MRG  ABD: NT ND normoactive BS, no palpable masses or hepatosplenomegaly  EXTREMITIES: without edema, denies calf tenderness  NEURO: CN II - XII grossly intact  CATHETER: Right IJ Trifusion (IR, 10/27/20) - CDI    Data:   CBC:   Recent Labs     11/10/20  0325 11/11/20  0320 11/12/20  0355   WBC 0.5* 0.2* 0.2*   HGB 10.0* 9.7* 9.7*   HCT 29.3* 27.7* 28.0*   MCV 89.1 88.3 88.7    158 112*     BMP/Mag:  Recent Labs     11/10/20  0325 11/11/20  0320 11/12/20  0355    137 138   K 3.8 3.7 3.7    108 107   CO2 23 24 24   PHOS  --  2.8  --    BUN 7 4* 4*   CREATININE <0.5* <0.5* <0.5*   MG  --   --  2.20     LIVP:   Recent Labs     11/11/20 0320   AST 27   ALT 34   BILIDIR <0.2   BILITOT 0.5   ALKPHOS 57     Uric Acid:    Recent Labs     11/12/20  0355   LABURIC 1.8*     Coags:   Recent Labs     11/12/20  0355   PROTIME 11.7   INR 1.01   APTT 31.2       PROBLEM LIST:            1. IgG Lambda smoldering multiple myeloma  2. JULIETH  3. OA  4. HTN  5. HLD  6. Insomnia  7. Osteopenia  8. Vit D deficiency    Post Transplant Complications:  1. Nausea / Vomiting  2. Diarrhea   3. Orthostatic Hypotension       TREATMENT:            1. RVD x 4 cycles (6/2020 - 9/2020)  2. HD Melphalan 200mg/m2 & ASCT (11/6/20) - 2.94 x10^6 [id number]/kg     ASSESSMENT AND PLAN:            1. IgG Lambda Smoldering Myeloma: PA  - S/p Tfs325 & ASCT (11/6/20)   - Post-Txp Maintenance: USOR trial - estephania/kiak vs kika maintenance     Day + 6     2. ID:  Afebrile, no evidence of infection.    - Cont Levaquin, Diflucan & Valtrex, cont acyclovir at discharge for VZV positive titer.       3.  Heme: anemia & leukopenia from chemotherapy   - Transfuse for Hgb < 7 and Platelets < 33G  - No transfusion today  - Cont Granix        4. Metabolic: Electrolytes and renal fxn stable   - Cont IVF: NS + KCL 20 meq @ 100 mL/hr  - Replace potassium and magnesium per PRN orders     5. Cardiac:   - H/o HTN & HLD  HTN: BP stable   - S/p IVF bolus for symptomatic orthostatic hypotension (11/8/20). - S/p amlodipine 5 mg & lisinopril-HCTZ 10-12.5mg (stopped 11/9/20) w/ orthostasis   HLD:    - Hold statin marj-transplant      6. GI / Nutrition:    Nutrition:  Appetite and oral intake is diminished some, but she is still eating   - Cont MVI  - Cont low microbial diet   - Follow closely with dietary  Nausea / Vomiting:  Delayed CINV has improved since (11/8/20)  - Cont scheduled Compazine 10 mg every QID (started 11/8/20)  - Cont Zyprex 5 mg daily (started 11/8/20)  - Cont Ativan as needed   Diarrhea:  Improved   - C. Diff (11/8/20) - negative  - Cont Imodium as needed      7. Psych: Insomnia:  No recent complaints   - Cont doxepin 10 mg nightly  - Cont melatonin PRN       - DVT Prophylaxis: Platelets >42,622 cells/dL, - daily lovenox prophylaxis ordered  Contraindications to pharmacologic prophylaxis: None  Contraindications to mechanical prophylaxis: None    - Disposition:  Once ANC >1 and recovered from toxicities of transplant      DOMINGO De La Rosa - EVIE Us. Jair Brown DO, MS  Oncology/Hematology Care    Please contact via:  1. [employer]  2.   Cell Phone:  [phone]    11/12/2020   9:51 AM

## 2020-11-12 NOTE — PLAN OF CARE
Problem: Falls - Risk of:  Goal: Will remain free from falls  Description: Will remain free from falls  11/12/2020 1019 by Mirza Viramontes RN  Outcome: Ongoing   Orthostatic vital signs obtained at start of shift - see flowsheet for details. Pt meets criteria for orthostasis. Pt is a medium all risk. See Charmayne Sender Fall Score and ABCDS Injury Risk assessments. - Screening for Orthostasis AND not a [place] Risk per COPPOLA/ABCDS: Pt bed is in low position, side rails up, call light and belongings are in reach. Fall risk light is on outside pts room. Pt encouraged to call for assistance as needed. Will continue with hourly rounds for PO intake, pain needs, toileting and repositioning as needed. Problem: Bleeding:  Goal: Will show no signs and symptoms of excessive bleeding  Description: Will show no signs and symptoms of excessive bleeding  11/12/2020 1019 by Mirza Viramontes RN  Outcome: Ongoing   Patient's hemoglobin this AM:   Recent Labs     11/12/20  0355   HGB 9.7*     Patient's platelet count this AM:   Recent Labs     11/12/20  0355   *    Thrombocytopenia Precautions in place. Patient showing no signs or symptoms of active bleeding. Transfusion not indicated at this time. Patient verbalizes understanding of all instructions. Will continue to assess and implement POC. Call light within reach and hourly rounding in place. Problem: PROTECTIVE PRECAUTIONS  Goal: Patient will remain free of nosocomial Infections  11/12/2020 1019 by Mirza Viramontes RN  Outcome: Ongoing   Pt remains in neutropenic precautions per floor policy. Pt, visitors, and staff noted to be following precautions appropriately. Handwashing in place; pt wearing mask in hallway per protocol. Pt in private room. Low microbial diet in place. Will continue to monitor.    Problem: Nutrition Deficit:  Goal: Ability to achieve adequate nutritional intake will improve  Description: Ability to achieve adequate nutritional

## 2020-11-12 NOTE — CARE COORDINATION
Case Management Assessment           Daily Note                 Date/ Time of Note: 11/12/2020 10:06 AM         Note completed by: Harriet Shay    Patient Name: Willow Patel  YOB: 1955    Diagnosis:Multiple myeloma [facility]) [C90.00]  Multiple myeloma in remission [facility]) [C90.01]  Patient Admission Status: Inpatient    Date of Admission:11/4/2020  9:15 AM Length of Stay: 8 GLOS:      Current Plan of Care: home  ________________________________________________________________________________________  PT AM-PAC:   / 24 per last evaluation on: none    OT AM-PAC:   / 24 per last evaluation on: none    DME Needs for discharge: none  ________________________________________________________________________________________  Discharge Plan: Home    Tentative discharge date: next week    Current barriers to discharge: medical clearance    Referrals completed: Not Applicable    Resources/ information provided: Not indicated at this time  ________________________________________________________________________________________  Case Management Notes: Day +6 from autologous transplant. Continue to anticipate no needs at discharge. Possible discharge at the end of next week? Winter Hoover and her family were provided with choice of provider; she and her family are in agreement with the discharge plan.     Care Transition Patient: No    Harriet Radha Shay  Case Management Department  Ph: [phone]  Fax: [phone]

## 2020-11-13 LAB
ALBUMIN SERPL-MCNC: 3.8 G/DL (ref 3.4–5)
ALP BLD-CCNC: 58 U/L (ref 40–129)
ALT SERPL-CCNC: 25 U/L (ref 10–40)
ANION GAP SERPL CALCULATED.3IONS-SCNC: 6 MMOL/L (ref 3–16)
AST SERPL-CCNC: 20 U/L (ref 15–37)
BILIRUB SERPL-MCNC: 0.3 MG/DL (ref 0–1)
BILIRUBIN DIRECT: <0.2 MG/DL (ref 0–0.3)
BILIRUBIN, INDIRECT: ABNORMAL MG/DL (ref 0–1)
BUN BLDV-MCNC: 4 MG/DL (ref 7–20)
CALCIUM SERPL-MCNC: 8 MG/DL (ref 8.3–10.6)
CHLORIDE BLD-SCNC: 107 MMOL/L (ref 99–110)
CO2: 24 MMOL/L (ref 21–32)
CREAT SERPL-MCNC: <0.5 MG/DL (ref 0.6–1.2)
GFR AFRICAN AMERICAN: >60
GFR NON-AFRICAN AMERICAN: >60
GLUCOSE BLD-MCNC: 94 MG/DL (ref 70–99)
HCT VFR BLD CALC: 27.4 % (ref 36–48)
HEMOGLOBIN: 9.3 G/DL (ref 12–16)
LACTATE DEHYDROGENASE: 167 U/L (ref 100–190)
MCH RBC QN AUTO: 30.5 PG (ref 26–34)
MCHC RBC AUTO-ENTMCNC: 33.9 G/DL (ref 31–36)
MCV RBC AUTO: 90.2 FL (ref 80–100)
PDW BLD-RTO: 13.4 % (ref 12.4–15.4)
PHOSPHORUS: 3.3 MG/DL (ref 2.5–4.9)
PLATELET # BLD: 50 K/UL (ref 135–450)
PMV BLD AUTO: 7.3 FL (ref 5–10.5)
POTASSIUM SERPL-SCNC: 3.9 MMOL/L (ref 3.5–5.1)
RBC # BLD: 3.03 M/UL (ref 4–5.2)
SODIUM BLD-SCNC: 137 MMOL/L (ref 136–145)
TOTAL PROTEIN: 5.4 G/DL (ref 6.4–8.2)
URIC ACID, SERUM: 2 MG/DL (ref 2.6–6)
WBC # BLD: 0.1 K/UL (ref 4–11)

## 2020-11-13 PROCEDURE — 85025 COMPLETE CBC W/AUTO DIFF WBC: CPT

## 2020-11-13 PROCEDURE — 6370000000 HC RX 637 (ALT 250 FOR IP): Performed by: INTERNAL MEDICINE

## 2020-11-13 PROCEDURE — 80076 HEPATIC FUNCTION PANEL: CPT

## 2020-11-13 PROCEDURE — 80048 BASIC METABOLIC PNL TOTAL CA: CPT

## 2020-11-13 PROCEDURE — 83615 LACTATE (LD) (LDH) ENZYME: CPT

## 2020-11-13 PROCEDURE — 6370000000 HC RX 637 (ALT 250 FOR IP): Performed by: NURSE PRACTITIONER

## 2020-11-13 PROCEDURE — 2060000000 HC ICU INTERMEDIATE R&B

## 2020-11-13 PROCEDURE — 84100 ASSAY OF PHOSPHORUS: CPT

## 2020-11-13 PROCEDURE — 6360000002 HC RX W HCPCS: Performed by: NURSE PRACTITIONER

## 2020-11-13 PROCEDURE — 2580000003 HC RX 258: Performed by: NURSE PRACTITIONER

## 2020-11-13 PROCEDURE — 36592 COLLECT BLOOD FROM PICC: CPT

## 2020-11-13 PROCEDURE — 84550 ASSAY OF BLOOD/URIC ACID: CPT

## 2020-11-13 RX ADMIN — OLANZAPINE 5 MG: 5 TABLET, FILM COATED ORAL at 08:48

## 2020-11-13 RX ADMIN — SODIUM CHLORIDE 15 ML: 900 IRRIGANT IRRIGATION at 10:13

## 2020-11-13 RX ADMIN — SODIUM CHLORIDE 15 ML: 900 IRRIGANT IRRIGATION at 21:29

## 2020-11-13 RX ADMIN — LEVOFLOXACIN 500 MG: 500 TABLET, FILM COATED ORAL at 21:26

## 2020-11-13 RX ADMIN — VALACYCLOVIR HYDROCHLORIDE 500 MG: 500 TABLET, FILM COATED ORAL at 08:48

## 2020-11-13 RX ADMIN — PROCHLORPERAZINE MALEATE 10 MG: 10 TABLET ORAL at 18:10

## 2020-11-13 RX ADMIN — LOPERAMIDE HYDROCHLORIDE 2 MG: 2 CAPSULE ORAL at 12:38

## 2020-11-13 RX ADMIN — LOPERAMIDE HYDROCHLORIDE 2 MG: 2 CAPSULE ORAL at 18:10

## 2020-11-13 RX ADMIN — PROCHLORPERAZINE MALEATE 10 MG: 10 TABLET ORAL at 21:26

## 2020-11-13 RX ADMIN — POTASSIUM CHLORIDE AND SODIUM CHLORIDE: 900; 150 INJECTION, SOLUTION INTRAVENOUS at 09:47

## 2020-11-13 RX ADMIN — PROCHLORPERAZINE MALEATE 10 MG: 10 TABLET ORAL at 08:47

## 2020-11-13 RX ADMIN — SODIUM CHLORIDE 15 ML: 900 IRRIGANT IRRIGATION at 15:48

## 2020-11-13 RX ADMIN — Medication 10 ML: at 21:29

## 2020-11-13 RX ADMIN — POTASSIUM CHLORIDE AND SODIUM CHLORIDE: 900; 150 INJECTION, SOLUTION INTRAVENOUS at 23:11

## 2020-11-13 RX ADMIN — DOXEPIN HYDROCHLORIDE 10 MG: 10 CAPSULE ORAL at 21:30

## 2020-11-13 RX ADMIN — PROCHLORPERAZINE MALEATE 10 MG: 10 TABLET ORAL at 12:38

## 2020-11-13 RX ADMIN — FLUCONAZOLE 400 MG: 200 TABLET ORAL at 08:48

## 2020-11-13 RX ADMIN — LOPERAMIDE HYDROCHLORIDE 2 MG: 2 CAPSULE ORAL at 08:48

## 2020-11-13 RX ADMIN — VALACYCLOVIR HYDROCHLORIDE 500 MG: 500 TABLET, FILM COATED ORAL at 21:26

## 2020-11-13 RX ADMIN — TBO-FILGRASTIM 300 MCG: 300 INJECTION, SOLUTION SUBCUTANEOUS at 18:10

## 2020-11-13 RX ADMIN — Medication 10 ML: at 08:48

## 2020-11-13 ASSESSMENT — PAIN SCALES - GENERAL
PAINLEVEL_OUTOF10: 0

## 2020-11-13 NOTE — PLAN OF CARE
Activity:  Goal: Ability to tolerate increased activity will improve  Description: Ability to tolerate increased activity will improve  11/13/2020 1103 by Dee Dee Flanagan RN  Outcome: Ongoing     Problem: Infection - Central Venous Catheter-Associated Bloodstream Infection:  Goal: Will show no infection signs and symptoms  Description: Will show no infection signs and symptoms  11/13/2020 1103 by Dee Dee Flanagan RN  Outcome: Ongoing     Problem: Nausea/Vomiting:  Goal: Ability to achieve adequate nutritional intake will improve  Description: Ability to achieve adequate nutritional intake will improve  11/13/2020 1103 by Dee Dee Flanagan RN  Outcome: Ongoing  Note: Pt having no complaints of nausea. Scheduled antiemetic continued as ordered.

## 2020-11-13 NOTE — PLAN OF CARE
Problem: Falls - Risk of:  Goal: Will remain free from falls  Description: Will remain free from falls  Outcome: Ongoing  Note: Orthostatic vital signs obtained at start of shift - see flowsheet for details. Pt does not meet criteria for orthostasis. Pt is a Med fall risk. See [place] Eldridgeucci Fall Score and ABCDS Injury Risk assessments. Pt bed is in low position, side rails up, call light and belongings are in reach. Fall risk light is on outside pts room. Pt encouraged to call for assistance as needed. Will continue with hourly rounds for PO intake, pain needs, toileting and repositioning as needed. Problem: Bleeding:  Goal: Will show no signs and symptoms of excessive bleeding  Description: Will show no signs and symptoms of excessive bleeding  Outcome: Ongoing  Note:   Patient's hemoglobin this AM:   Recent Labs     11/13/20  0425   HGB 9.3*     Patient's platelet count this AM:   Recent Labs     11/13/20  0425   PLT 50*    Thrombocytopenia Precautions in place. Patient showing no signs or symptoms of active bleeding. Transfusion not indicated at this time. Patient verbalizes understanding of all instructions. Will continue to assess and implement POC. Call light within reach and hourly rounding in place. Problem: PROTECTIVE PRECAUTIONS  Goal: Patient will remain free of nosocomial Infections  Outcome: Ongoing  Note: Pt remains in protective precautions. No living plants or fresh flowers in his/her room. Patient educated on wearing mask when in hallways. Patient, staff, and visitors adhering to handwashing guidelines. Patient cleansed with chlorhexidine wipes and linens changed daily per protocol. Pt verbalizes understanding of low microbial diet. Patient remains free of nosocomial infections.         Problem: Nutrition Deficit:  Goal: Ability to achieve adequate nutritional intake will improve  Description: Ability to achieve adequate nutritional intake will improve  Outcome: Ongoing Problem: Discharge Planning:  Goal: Discharged to appropriate level of care  Description: Discharged to appropriate level of care  Outcome: Ongoing     Problem: Activity:  Goal: Ability to tolerate increased activity will improve  Description: Ability to tolerate increased activity will improve  Outcome: Ongoing     Problem: Infection - Central Venous Catheter-Associated Bloodstream Infection:  Goal: Will show no infection signs and symptoms  Description: Will show no infection signs and symptoms  Outcome: Ongoing  Note: CVC site remains free of signs/symptoms of infection. No drainage, edema, erythema, pain, or warmth noted at site. Dressing changes continue per protocol and on an as needed basis - see flowsheet. Problem: Nausea/Vomiting:  Goal: Absence of nausea/vomiting  Description: Pt continues with nausea. Pt has both scheduled and PRN antiemetics if needed. Will continue to monitor.   Outcome: Ongoing

## 2020-11-13 NOTE — PROGRESS NOTES
[address] [place] [address]  Autologous Progress Note    2020    Lazaro Bueno    :  1955    MRN:  [id number]    Referring MD: Isis Chavez MD  [address] [place] [address]      Subjective:  Doing well - No new issues     ECOG PS:  (1) Restricted in physically strenuous activity, ambulatory and able to do work of light nature    KPS: 80% Normal activity with effort; some signs or symptoms of disease    Isolation:  None     Medications    Scheduled Meds:   levoFLOXacin  500 mg Oral Nightly    prochlorperazine  10 mg Oral 4x Daily    Or    prochlorperazine  10 mg Intravenous 4x Daily    OLANZapine  5 mg Oral Daily    tbo-filgrastim  300 mcg Subcutaneous QPM    enoxaparin  40 mg Subcutaneous Daily    fluconazole  400 mg Oral Daily    Saline Mouthwash  15 mL Swish & Spit 4x Daily AC & HS    sodium chloride flush  10 mL Intravenous 2 times per day    valACYclovir  500 mg Oral BID    doxepin  10 mg Oral Nightly    multivitamin  1 tablet Oral Daily     Continuous Infusions:   0.9% NaCl with KCl 20 mEq 75 mL/hr at 20 2102    sodium chloride       PRN Meds:. [COMPLETED] loperamide **FOLLOWED BY** loperamide, calcium carbonate, morphine, sodium chloride, melatonin, alteplase, magnesium hydroxide, magnesium sulfate, potassium chloride, Saline Mouthwash, LORazepam **OR** LORazepam    ROS:  As noted above, otherwise remainder of 10-point ROS negative    Physical Exam:     I&O:      Intake/Output Summary (Last 24 hours) at 2020 0629  Last data filed at 2020 2235  Gross per 24 hour   Intake 3023 ml   Output 4300 ml   Net -1277 ml       Vital Signs:  /62   Pulse 79   Temp 98.3 °F (36.8 °C) (Oral)   Resp 17   Ht 5' 3\" (1.6 m)   Wt 141 lb 14.4 oz (64.4 kg)   SpO2 96%   BMI 25.14 kg/m²     Weight:    Wt Readings from Last 3 Encounters:   20 141 lb 14.4 oz (64.4 kg)   10/27/20 144 lb (65.3 kg)   10/16/20 149 lb 7.6 oz (67.8 kg)       General: Awake, alert & leukopenia from chemotherapy   - Transfuse for Hgb < 7 and Platelets < 93X  - No transfusion today  - Cont Granix      4. Metabolic: Electrolytes and renal fxn stable   - Cont IVF: NS + KCL 20 meq @ 75 mL/hr  - Replace potassium and magnesium per PRN orders     5. Cardiac:   - H/o HTN & HLD  HTN: BP stable   - S/p IVF bolus for symptomatic orthostatic hypotension (11/8/20). - S/p amlodipine 5 mg & lisinopril-HCTZ 10-12.5mg (stopped 11/9/20) w/ orthostasis   HLD:    - Hold statin marj-transplant      6. GI / Nutrition:    Nutrition:  Appetite and oral intake is  adequate  - Cont MVI  - Cont low microbial diet   - Follow closely with dietary  Nausea / Vomiting:  Delayed CINV has improved since (11/8/20)  - Cont scheduled Compazine 10 mg every QID (started 11/8/20)  - Cont Zyprex 5 mg daily (started 11/8/20)  - Cont Ativan as needed   Diarrhea:  Improved   - C. Diff (11/8/20) - negative  - Cont Imodium as needed      7. Psych: Insomnia:  No recent complaints   - Cont doxepin 10 mg nightly  - Cont melatonin PRN       - DVT Prophylaxis: Platelets <08,589 cells/dL - prophylactic lovenox on hold and mechanical prophylaxis with bilateral SCDs while in bed in place. Contraindications to pharmacologic prophylaxis: Thrombocytopenia  Contraindications to mechanical prophylaxis: None    - Disposition:  Once ANC >1 and recovered from toxicities of transplant      DOMINGO Chinchilla - EVIE Garcia. Brittni Lloyd DO, MS  [facility]    Please contact via:  1. Perfect Serve  2.   Cell Phone:  [phone]    11/13/2020   10:07 AM

## 2020-11-14 LAB
ANION GAP SERPL CALCULATED.3IONS-SCNC: 9 MMOL/L (ref 3–16)
BUN BLDV-MCNC: 5 MG/DL (ref 7–20)
CALCIUM SERPL-MCNC: 8.1 MG/DL (ref 8.3–10.6)
CHLORIDE BLD-SCNC: 109 MMOL/L (ref 99–110)
CO2: 23 MMOL/L (ref 21–32)
CREAT SERPL-MCNC: <0.5 MG/DL (ref 0.6–1.2)
GFR AFRICAN AMERICAN: >60
GFR NON-AFRICAN AMERICAN: >60
GLUCOSE BLD-MCNC: 96 MG/DL (ref 70–99)
HCT VFR BLD CALC: 26.2 % (ref 36–48)
HEMOGLOBIN: 9.1 G/DL (ref 12–16)
MCH RBC QN AUTO: 30.8 PG (ref 26–34)
MCHC RBC AUTO-ENTMCNC: 34.8 G/DL (ref 31–36)
MCV RBC AUTO: 88.5 FL (ref 80–100)
PDW BLD-RTO: 13.1 % (ref 12.4–15.4)
PLATELET # BLD: 20 K/UL (ref 135–450)
PMV BLD AUTO: 6.9 FL (ref 5–10.5)
POTASSIUM SERPL-SCNC: 4.1 MMOL/L (ref 3.5–5.1)
RBC # BLD: 2.96 M/UL (ref 4–5.2)
SODIUM BLD-SCNC: 141 MMOL/L (ref 136–145)
URIC ACID, SERUM: 1.9 MG/DL (ref 2.6–6)
WBC # BLD: 0.1 K/UL (ref 4–11)

## 2020-11-14 PROCEDURE — 85025 COMPLETE CBC W/AUTO DIFF WBC: CPT

## 2020-11-14 PROCEDURE — 6360000002 HC RX W HCPCS: Performed by: NURSE PRACTITIONER

## 2020-11-14 PROCEDURE — 80048 BASIC METABOLIC PNL TOTAL CA: CPT

## 2020-11-14 PROCEDURE — 84550 ASSAY OF BLOOD/URIC ACID: CPT

## 2020-11-14 PROCEDURE — 2060000000 HC ICU INTERMEDIATE R&B

## 2020-11-14 PROCEDURE — 6370000000 HC RX 637 (ALT 250 FOR IP): Performed by: NURSE PRACTITIONER

## 2020-11-14 PROCEDURE — 2580000003 HC RX 258: Performed by: NURSE PRACTITIONER

## 2020-11-14 PROCEDURE — 6370000000 HC RX 637 (ALT 250 FOR IP): Performed by: INTERNAL MEDICINE

## 2020-11-14 PROCEDURE — 36592 COLLECT BLOOD FROM PICC: CPT

## 2020-11-14 RX ORDER — 0.9 % SODIUM CHLORIDE 0.9 %
1000 INTRAVENOUS SOLUTION INTRAVENOUS ONCE
Status: COMPLETED | OUTPATIENT
Start: 2020-11-14 | End: 2020-11-14

## 2020-11-14 RX ADMIN — PROCHLORPERAZINE MALEATE 10 MG: 10 TABLET ORAL at 12:40

## 2020-11-14 RX ADMIN — FLUCONAZOLE 400 MG: 200 TABLET ORAL at 08:59

## 2020-11-14 RX ADMIN — TBO-FILGRASTIM 300 MCG: 300 INJECTION, SOLUTION SUBCUTANEOUS at 17:25

## 2020-11-14 RX ADMIN — OLANZAPINE 5 MG: 5 TABLET, FILM COATED ORAL at 09:00

## 2020-11-14 RX ADMIN — PROCHLORPERAZINE MALEATE 10 MG: 10 TABLET ORAL at 20:14

## 2020-11-14 RX ADMIN — VALACYCLOVIR HYDROCHLORIDE 500 MG: 500 TABLET, FILM COATED ORAL at 09:00

## 2020-11-14 RX ADMIN — SODIUM CHLORIDE 15 ML: 900 IRRIGANT IRRIGATION at 09:00

## 2020-11-14 RX ADMIN — PROCHLORPERAZINE MALEATE 10 MG: 10 TABLET ORAL at 08:59

## 2020-11-14 RX ADMIN — LEVOFLOXACIN 500 MG: 500 TABLET, FILM COATED ORAL at 20:14

## 2020-11-14 RX ADMIN — PROCHLORPERAZINE MALEATE 10 MG: 10 TABLET ORAL at 17:25

## 2020-11-14 RX ADMIN — Medication 10 ML: at 20:14

## 2020-11-14 RX ADMIN — Medication 10 ML: at 08:59

## 2020-11-14 RX ADMIN — SODIUM CHLORIDE 15 ML: 900 IRRIGANT IRRIGATION at 11:48

## 2020-11-14 RX ADMIN — SODIUM CHLORIDE 15 ML: 900 IRRIGANT IRRIGATION at 17:25

## 2020-11-14 RX ADMIN — VALACYCLOVIR HYDROCHLORIDE 500 MG: 500 TABLET, FILM COATED ORAL at 20:14

## 2020-11-14 RX ADMIN — SODIUM CHLORIDE 15 ML: 900 IRRIGANT IRRIGATION at 20:14

## 2020-11-14 RX ADMIN — POTASSIUM CHLORIDE AND SODIUM CHLORIDE: 900; 150 INJECTION, SOLUTION INTRAVENOUS at 11:48

## 2020-11-14 RX ADMIN — SODIUM CHLORIDE 1000 ML: 9 INJECTION, SOLUTION INTRAVENOUS at 09:11

## 2020-11-14 RX ADMIN — DOXEPIN HYDROCHLORIDE 10 MG: 10 CAPSULE ORAL at 20:32

## 2020-11-14 ASSESSMENT — PAIN SCALES - GENERAL
PAINLEVEL_OUTOF10: 0

## 2020-11-14 NOTE — PLAN OF CARE
Problem: Falls - Risk of:  Goal: Will remain free from falls  Description: Will remain free from falls  11/14/2020 1212 by Avinash Ojeda RN  Outcome: Ongoing     Orthostatic vital signs obtained at start of shift - see flowsheet for details. Pt Met criteria for orthostasis at beginning of shift- is currently ortho negative. Pt is a Med fall risk. See Terald Median Fall Score and ABCDS Injury Risk assessments.   + Screening for Orthostasis and/or + High Fall Risk per COPPOLA/ABCDS: Explained fall risk precautions to pt and rationale behind their use to keep the patient safe. Pt bed is in low position, side rails up, call light and belongings are in reach. Pt encouraged to call for assistance. Will continue with hourly rounds for PO intake, pain needs, toileting and repositioning as needed. Post one liter bolus, pt is no longer ortho +, pt is off the JASSI, up ad salome and without sx related to orthostasis. Problem: Bleeding:  Goal: Will show no signs and symptoms of excessive bleeding  Description: Will show no signs and symptoms of excessive bleeding  11/14/2020 1212 by Avinash Ojeda RN  Outcome: Ongoing    Patient's hemoglobin this AM:   Recent Labs     11/14/20  0300   HGB 9.1*     Patient's platelet count this AM:   Recent Labs     11/14/20  0300   PLT 20*    Thrombocytopenia Precautions in place. Patient showing no signs or symptoms of active bleeding. Transfusion not indicated at this time. Patient verbalizes understanding of all instructions. Will continue to assess and implement POC. Call light within reach and hourly rounding in place. Problem: PROTECTIVE PRECAUTIONS  Goal: Patient will remain free of nosocomial Infections  11/14/2020 1212 by Avinash Ojeda RN  Outcome: Ongoing    No s/sx of infection noted.        Problem: Discharge Planning:  Goal: Discharged to appropriate level of care  Description: Discharged to appropriate level of care  11/14/2020 1212 by Avinash Ojeda RN  Outcome: Ongoing     Pt verbalizes understanding of POC. Problem: Infection - Central Venous Catheter-Associated Bloodstream Infection:  Goal: Will show no infection signs and symptoms  Description: Will show no infection signs and symptoms  11/14/2020 1212 by Hubert Jackson RN  Outcome: Ongoing    CVC site remains free of signs/symptoms of infection. No drainage, edema, erythema, pain, or warmth noted at site. Dressing changes continue per protocol and on an as needed basis - see flowsheet. Compliant with [facility] Bath Protocol:  Performed CHG bath today per [facility] protocol utilizing CHG solution in the shower. CVC site cleansed with CHG wipe over dressing, skin surrounding dressing, and first 6\" of IV tubing. Pt tolerated well. Continued to encourage daily CHG bathing per [facility] protocol. Problem: Nausea/Vomiting:  Goal: Ability to achieve adequate nutritional intake will improve  Description: Ability to achieve adequate nutritional intake will improve  11/14/2020 1212 by Hubert Jackson RN  Outcome: Ongoing    Pt continues to have nausea. Medicated per scheduled antiemetics. See flowsheet I/O for intake/output.

## 2020-11-14 NOTE — PROGRESS NOTES
[facility]  Autologous Progress Note    2020    Earl Judd    :  1955    MRN:  [id number]    Referring MD: Eneida French MD  [address] [employer] [facility]  [address] [place] [address]      Subjective:  Feels well except for slightly decreased appetite, mild irritation on the right side of her tongue and orthostatic this morning. ECOG PS:  (1) Restricted in physically strenuous activity, ambulatory and able to do work of light nature    KPS: 80% Normal activity with effort; some signs or symptoms of disease    Isolation:  None     Medications    Scheduled Meds:   levoFLOXacin  500 mg Oral Nightly    prochlorperazine  10 mg Oral 4x Daily    Or    prochlorperazine  10 mg Intravenous 4x Daily    OLANZapine  5 mg Oral Daily    tbo-filgrastim  300 mcg Subcutaneous QPM    fluconazole  400 mg Oral Daily    Saline Mouthwash  15 mL Swish & Spit 4x Daily AC & HS    sodium chloride flush  10 mL Intravenous 2 times per day    valACYclovir  500 mg Oral BID    doxepin  10 mg Oral Nightly     Continuous Infusions:   0.9% NaCl with KCl 20 mEq 75 mL/hr at 20 2311    sodium chloride       PRN Meds:. [COMPLETED] loperamide **FOLLOWED BY** loperamide, calcium carbonate, morphine, sodium chloride, melatonin, alteplase, magnesium hydroxide, magnesium sulfate, potassium chloride, Saline Mouthwash, LORazepam **OR** LORazepam    ROS:  As noted above, otherwise remainder of 10-point ROS negative    Physical Exam:     I&O:      Intake/Output Summary (Last 24 hours) at 2020 0758  Last data filed at 2020 4153  Gross per 24 hour   Intake 3611 ml   Output 5150 ml   Net -1539 ml       Vital Signs:  BP (!) 101/47   Pulse 75   Temp 98.3 °F (36.8 °C) (Oral)   Resp 18   Ht 5' 3\" (1.6 m)   Wt 140 lb 11.2 oz (63.8 kg)   SpO2 96%   BMI 24.92 kg/m²     Weight:    Wt Readings from Last 3 Encounters:   20 140 lb 11.2 oz (63.8 kg)   10/27/20 144 lb (65.3 kg)   10/16/20 149 lb 7.6 oz (67.8 kg) General: Awake, alert and oriented. HEENT: normocephalic, PERRL, no scleral erythema or icterus, Oral mucosa moist w/ slight irritation to right side of tongue. No ulceration and throat clear  NECK: supple   BACK: Straight   SKIN: warm dry and intact without lesions rashes or masses  CHEST: CTA bilaterally without use of accessory muscles  CV: Normal S1 S2, RRR, no MRG  ABD: NT ND normoactive BS, no palpable masses or hepatosplenomegaly  EXTREMITIES: without edema, denies calf tenderness  NEURO: CN II - XII grossly intact  CATHETER: Right IJ Trifusion (IR, 10/27/20) - CDI    Data:   CBC:   Recent Labs     11/12/20 0355 11/13/20 0425 11/14/20  0300   WBC 0.2* 0.1* 0.1*   HGB 9.7* 9.3* 9.1*   HCT 28.0* 27.4* 26.2*   MCV 88.7 90.2 88.5   * 50* 20*     BMP/Mag:  Recent Labs     11/12/20 0355 11/13/20 0425 11/14/20  0300    137 141   K 3.7 3.9 4.1    107 109   CO2 24 24 23   PHOS  --  3.3  --    BUN 4* 4* 5*   CREATININE <0.5* <0.5* <0.5*   MG 2.20  --   --      LIVP:   Recent Labs     11/13/20 0425   AST 20   ALT 25   BILIDIR <0.2   BILITOT 0.3   ALKPHOS 58     Uric Acid:    Recent Labs     11/14/20  0300   LABURIC 1.9*     Coags:   Recent Labs     11/12/20 0355   PROTIME 11.7   INR 1.01   APTT 31.2       PROBLEM LIST:            1. IgG Lambda smoldering multiple myeloma  2. JULIETH  3. OA  4. HTN  5. HLD  6. Insomnia  7. Osteopenia  8. Vit D deficiency    Post Transplant Complications:  1. Nausea / Vomiting  2. Diarrhea   3. Orthostatic Hypotension       TREATMENT:            1. RVD x 4 cycles (6/2020 - 9/2020)  2. HD Melphalan 200mg/m2 & ASCT (11/6/20) - 2.94 x10^6 [id number]/kg     ASSESSMENT AND PLAN:            1. IgG Lambda Smoldering Myeloma: [place]  - S/p [id number] & ASCT (11/6/20)   - Post-Txp Maintenance: USOR trial - estephania/kika vs kika maintenance  - Post - Txp disease eval:  BMBx w/ FISH & myeloma labs      Day + 8     2.  ID:  Afebrile, no evidence of infection.    - Cont Levaquin, Diflucan & Valtrex, cont acyclovir at discharge for VZV positive titer.       3. Heme: anemia & leukopenia from chemotherapy   - Transfuse for Hgb < 7 and Platelets < 08V  - No transfusion today  - Cont Granix      4. Metabolic: Electrolytes and renal fxn stable   - Cont IVF: NS + KCL 20 meq @ 75 mL/hr  - Replace potassium and magnesium per PRN orders     5. Cardiac:   - H/o HTN & HLD  HTN: orthostasis (11/14/20) & symptomatic  - S/p IVF bolus for symptomatic orthostatic hypotension (11/8/20). - S/p amlodipine 5 mg & lisinopril-HCTZ 10-12.5mg (stopped 11/9/20) w/ orthostasis. Resume if BP increases   - IV bolus (11/14/20)  HLD:    - Hold statin marj-transplant      6. GI / Nutrition:    Nutrition:  Appetite and oral intake is adequate  - Cont MVI  - Cont low microbial diet   - Follow closely with dietary  Nausea / Vomiting:  Delayed CINV has improved since (11/8/20)  - Cont scheduled Compazine 10 mg every QID (started 11/8/20)  - Cont Zyprex 5 mg daily (started 11/8/20)  - Cont Ativan as needed   Diarrhea:  Intermittent  - C. Diff (11/8/20) - negative  - Cont Imodium as needed   Mucositis:  Mild irritation to right side of tongue  - Start MMW as needed     7. Psych: Insomnia:  No recent complaints   - Cont doxepin 10 mg nightly  - Cont melatonin PRN       - DVT Prophylaxis: Platelets <04,803 cells/dL - prophylactic lovenox on hold and mechanical prophylaxis with bilateral SCDs while in bed in place. Contraindications to pharmacologic prophylaxis: Thrombocytopenia  Contraindications to mechanical prophylaxis: None    - Disposition:  Once ANC >1 and recovered from toxicities of transplant      DOMINGO Greene - EVIE Solomon Part. Sunny Beltre DO, MS  Oncology/Hematology Care    Please contact via:  1. Perfect Serve  2.   Cell Phone:  [phone]    11/14/2020   2:06 PM

## 2020-11-14 NOTE — PLAN OF CARE
Nausea/Vomiting:  Goal: Able to eat  Description: Able to eat  Outcome: Ongoing     Problem: Nutrition  Goal: Optimal nutrition therapy  Outcome: Ongoing

## 2020-11-15 LAB
ANION GAP SERPL CALCULATED.3IONS-SCNC: 8 MMOL/L (ref 3–16)
BILIRUBIN URINE: NEGATIVE
BLOOD BANK DISPENSE STATUS: NORMAL
BLOOD BANK PRODUCT CODE: NORMAL
BLOOD, URINE: NEGATIVE
BPU ID: NORMAL
BUN BLDV-MCNC: 3 MG/DL (ref 7–20)
CALCIUM SERPL-MCNC: 8.2 MG/DL (ref 8.3–10.6)
CHLORIDE BLD-SCNC: 106 MMOL/L (ref 99–110)
CLARITY: CLEAR
CO2: 22 MMOL/L (ref 21–32)
COLOR: YELLOW
CREAT SERPL-MCNC: <0.5 MG/DL (ref 0.6–1.2)
DESCRIPTION BLOOD BANK: NORMAL
GFR AFRICAN AMERICAN: >60
GFR NON-AFRICAN AMERICAN: >60
GLUCOSE BLD-MCNC: 98 MG/DL (ref 70–99)
GLUCOSE URINE: NEGATIVE MG/DL
HCT VFR BLD CALC: 25.6 % (ref 36–48)
HEMOGLOBIN: 8.8 G/DL (ref 12–16)
KETONES, URINE: ABNORMAL MG/DL
LEUKOCYTE ESTERASE, URINE: NEGATIVE
MCH RBC QN AUTO: 30.6 PG (ref 26–34)
MCHC RBC AUTO-ENTMCNC: 34.3 G/DL (ref 31–36)
MCV RBC AUTO: 89.4 FL (ref 80–100)
MICROSCOPIC EXAMINATION: ABNORMAL
NITRITE, URINE: NEGATIVE
PDW BLD-RTO: 13.1 % (ref 12.4–15.4)
PH UA: 7 (ref 5–8)
PLATELET # BLD: 4 K/UL (ref 135–450)
PMV BLD AUTO: 7.7 FL (ref 5–10.5)
POTASSIUM SERPL-SCNC: 3.8 MMOL/L (ref 3.5–5.1)
PROTEIN UA: NEGATIVE MG/DL
RBC # BLD: 2.87 M/UL (ref 4–5.2)
SODIUM BLD-SCNC: 136 MMOL/L (ref 136–145)
SPECIFIC GRAVITY UA: 1.01 (ref 1–1.03)
URIC ACID, SERUM: 1.8 MG/DL (ref 2.6–6)
URINE TYPE: ABNORMAL
UROBILINOGEN, URINE: 0.2 E.U./DL
WBC # BLD: 0.1 K/UL (ref 4–11)

## 2020-11-15 PROCEDURE — 2580000003 HC RX 258: Performed by: NURSE PRACTITIONER

## 2020-11-15 PROCEDURE — 36592 COLLECT BLOOD FROM PICC: CPT

## 2020-11-15 PROCEDURE — 81003 URINALYSIS AUTO W/O SCOPE: CPT

## 2020-11-15 PROCEDURE — 2580000003 HC RX 258: Performed by: INTERNAL MEDICINE

## 2020-11-15 PROCEDURE — 80048 BASIC METABOLIC PNL TOTAL CA: CPT

## 2020-11-15 PROCEDURE — 6370000000 HC RX 637 (ALT 250 FOR IP): Performed by: NURSE PRACTITIONER

## 2020-11-15 PROCEDURE — 6360000002 HC RX W HCPCS: Performed by: NURSE PRACTITIONER

## 2020-11-15 PROCEDURE — P9037 PLATE PHERES LEUKOREDU IRRAD: HCPCS

## 2020-11-15 PROCEDURE — 2060000000 HC ICU INTERMEDIATE R&B

## 2020-11-15 PROCEDURE — 6370000000 HC RX 637 (ALT 250 FOR IP): Performed by: INTERNAL MEDICINE

## 2020-11-15 PROCEDURE — 85025 COMPLETE CBC W/AUTO DIFF WBC: CPT

## 2020-11-15 PROCEDURE — 84550 ASSAY OF BLOOD/URIC ACID: CPT

## 2020-11-15 PROCEDURE — 36430 TRANSFUSION BLD/BLD COMPNT: CPT

## 2020-11-15 RX ORDER — 0.9 % SODIUM CHLORIDE 0.9 %
20 INTRAVENOUS SOLUTION INTRAVENOUS ONCE
Status: COMPLETED | OUTPATIENT
Start: 2020-11-15 | End: 2020-11-15

## 2020-11-15 RX ADMIN — OLANZAPINE 5 MG: 5 TABLET, FILM COATED ORAL at 07:55

## 2020-11-15 RX ADMIN — Medication 10 ML: at 20:56

## 2020-11-15 RX ADMIN — VALACYCLOVIR HYDROCHLORIDE 500 MG: 500 TABLET, FILM COATED ORAL at 07:55

## 2020-11-15 RX ADMIN — TBO-FILGRASTIM 300 MCG: 300 INJECTION, SOLUTION SUBCUTANEOUS at 17:19

## 2020-11-15 RX ADMIN — PROCHLORPERAZINE MALEATE 10 MG: 10 TABLET ORAL at 13:41

## 2020-11-15 RX ADMIN — VALACYCLOVIR HYDROCHLORIDE 500 MG: 500 TABLET, FILM COATED ORAL at 20:56

## 2020-11-15 RX ADMIN — PROCHLORPERAZINE MALEATE 10 MG: 10 TABLET ORAL at 07:55

## 2020-11-15 RX ADMIN — LEVOFLOXACIN 500 MG: 500 TABLET, FILM COATED ORAL at 20:56

## 2020-11-15 RX ADMIN — POTASSIUM CHLORIDE AND SODIUM CHLORIDE: 900; 150 INJECTION, SOLUTION INTRAVENOUS at 13:41

## 2020-11-15 RX ADMIN — SODIUM CHLORIDE 15 ML: 900 IRRIGANT IRRIGATION at 07:55

## 2020-11-15 RX ADMIN — POTASSIUM CHLORIDE AND SODIUM CHLORIDE: 900; 150 INJECTION, SOLUTION INTRAVENOUS at 00:04

## 2020-11-15 RX ADMIN — POTASSIUM CHLORIDE AND SODIUM CHLORIDE: 900; 150 INJECTION, SOLUTION INTRAVENOUS at 21:27

## 2020-11-15 RX ADMIN — PROCHLORPERAZINE MALEATE 10 MG: 10 TABLET ORAL at 17:19

## 2020-11-15 RX ADMIN — Medication 10 ML: at 07:55

## 2020-11-15 RX ADMIN — SODIUM CHLORIDE 15 ML: 900 IRRIGANT IRRIGATION at 20:56

## 2020-11-15 RX ADMIN — DOXEPIN HYDROCHLORIDE 10 MG: 10 CAPSULE ORAL at 21:32

## 2020-11-15 RX ADMIN — SODIUM CHLORIDE 20 ML: 9 INJECTION, SOLUTION INTRAVENOUS at 07:56

## 2020-11-15 RX ADMIN — FLUCONAZOLE 400 MG: 200 TABLET ORAL at 07:55

## 2020-11-15 RX ADMIN — PROCHLORPERAZINE MALEATE 10 MG: 10 TABLET ORAL at 20:56

## 2020-11-15 ASSESSMENT — PAIN SCALES - GENERAL
PAINLEVEL_OUTOF10: 0

## 2020-11-15 NOTE — PLAN OF CARE
Problem: Falls - Risk of:  Goal: Will remain free from falls  Description: Will remain free from falls  11/14/2020 1212 by Riley Rangel RN  Outcome: Ongoing   Orthostatic vital signs obtained at start of shift - see flowsheet for details. Pt does not meet criteria for orthostasis. Pt is a Med fall risk. See Wing Deis Fall Score and ABCDS Injury Risk assessments. - Screening for Orthostasis AND not a [place] Risk per COPPOLA/ABCDS: Pt bed is in low position, side rails up, call light and belongings are in reach. Fall risk light is on outside pts room. Pt encouraged to call for assistance as needed. Will continue with hourly rounds for PO intake, pain needs, toileting and repositioning as needed. Problem: Bleeding:  Goal: Will show no signs and symptoms of excessive bleeding  Description: Will show no signs and symptoms of excessive bleeding  11/14/2020 1212 by Riley Rangel RN  Outcome: Ongoing    Patient's hemoglobin this AM:   Recent Labs     11/15/20  0330   HGB 8.8*     Patient's platelet count this AM:   Recent Labs     11/15/20  0330   PLT 4*    Thrombocytopenia Precautions in place. Patient showing no signs or symptoms of active bleeding. Nightshift RN transfused platelets per standing orders. Patient verbalizes understanding of all instructions. Will continue to assess and implement POC. Call light within reach and hourly rounding in place. Problem: PROTECTIVE PRECAUTIONS  Goal: Patient will remain free of nosocomial Infections  11/14/2020 1212 by Riley Rangel RN  Outcome: Ongoing    No s/sx of infection noted. Problem: Discharge Planning:  Goal: Discharged to appropriate level of care  Description: Discharged to appropriate level of care  11/14/2020 1212 by Riley Rangel RN  Outcome: Ongoing     Pt verbalizes understanding of POC.       Problem: Infection - Central Venous Catheter-Associated Bloodstream Infection:  Goal: Will show no infection signs and symptoms  Description: Will show no infection signs and symptoms  11/14/2020 1212 by Lisa Kapadia RN  Outcome: Ongoing    CVC site remains free of signs/symptoms of infection. No drainage, edema, erythema, pain, or warmth noted at site. Dressing changes continue per protocol and on an as needed basis - see flowsheet. Pt states she will shower this shift. Problem: Nausea/Vomiting:  Goal: Ability to achieve adequate nutritional intake will improve  Description: Ability to achieve adequate nutritional intake will improve  11/14/2020 1212 by Lisa Kapadia RN  Outcome: Ongoing    Pt continues to have nausea. Medicated per scheduled antiemetics. See flowsheet I/O for intake/output.

## 2020-11-15 NOTE — PLAN OF CARE
Problem: Falls - Risk of:  Goal: Will remain free from falls  Description: Will remain free from falls  Outcome: Met This Shift  Note: Patient remained free of falls during shift. Patient is a Grey Fall Risk: Medium (25-44); uses call light appropriately, ambulates with a steady gait and no assistive devices. Orthostatic blood pressures assessed and patient remains negative. Will continue to encourage ambulation and implement POC. Call light within reach and hourly rounding in place. Problem: Falls - Risk of:  Goal: Absence of physical injury  Description: Absence of physical injury  Outcome: Met This Shift     Problem: Bleeding:  Goal: Will show no signs and symptoms of excessive bleeding  Description: Will show no signs and symptoms of excessive bleeding  Outcome: Ongoing     Problem: PROTECTIVE PRECAUTIONS  Goal: Patient will remain free of nosocomial Infections  Outcome: Ongoing  Note: Patient is showing no signs or symptoms of nosocomial infections. Patient's temp during shift are as follows: Temp (8hrs), Av.5 °F (36.9 °C), Min:98.4 °F (36.9 °C), Max:98.5 °F (36.9 °C)  . Patient placed in private room and instructed on importance of handwashing and when to wear a mask when ambulating in hallway. Will continue to assess for s/s of infection and implement POC. Call light within reach and hourly rounding in place. Problem: Nutrition Deficit:  Goal: Ability to achieve adequate nutritional intake will improve  Description: Ability to achieve adequate nutritional intake will improve  Outcome: Met This Shift     Problem: Discharge Planning:  Goal: Discharged to appropriate level of care  Description: Discharged to appropriate level of care  Outcome: Ongoing     Problem:  Activity:  Goal: Ability to tolerate increased activity will improve  Description: Ability to tolerate increased activity will improve  Outcome: Ongoing     Problem: Infection - Central Venous Catheter-Associated Bloodstream Infection:  Goal: Will show no infection signs and symptoms  Description: Will show no infection signs and symptoms  Outcome: Ongoing  Note: Patient's CVC site shows no signs or symptoms of infection. No drainage, edema, erythema, pain, or warmth noted at site. Vital signs stable and all lines flushed with blood return noted. Dressing changes continue per protocol and on an as needed basis - see flowsheet. Will continue to monitor for symptoms of infection, pain and/or discomfort. Call light within reach and hourly rounding in place. Problem: Nausea/Vomiting:  Goal: Ability to achieve adequate nutritional intake will improve  Description: Ability to achieve adequate nutritional intake will improve  Outcome: Met This Shift     Problem: Nausea/Vomiting:  Goal: Absence of nausea/vomiting  Description: Pt continues with nausea. Pt has both scheduled and PRN antiemetics if needed. Will continue to monitor.   Outcome: Met This Shift     Problem: Nausea/Vomiting:  Goal: Able to drink  Description: Able to drink  Outcome: Met This Shift     Problem: Nausea/Vomiting:  Goal: Able to eat  Description: Able to eat  Outcome: Ongoing     Problem: Nutrition  Goal: Optimal nutrition therapy  Outcome: Ongoing

## 2020-11-15 NOTE — PROGRESS NOTES
and oriented. HEENT: normocephalic, PERRL, no scleral erythema or icterus, Oral mucosa moist w/out ulceration and throat clear  NECK: supple   BACK: Straight   SKIN: warm dry and intact without lesions rashes or masses  CHEST: CTA bilaterally without use of accessory muscles  CV: Normal S1 S2, RRR, no MRG  ABD: NT ND normoactive BS, no palpable masses or hepatosplenomegaly  EXTREMITIES: without edema, denies calf tenderness  NEURO: CN II - XII grossly intact  CATHETER: Right IJ Trifusion (IR, 10/27/20) - CDI    Data:   CBC:   Recent Labs     11/13/20  0425 11/14/20  0300 11/15/20  0330   WBC 0.1* 0.1* 0.1*   HGB 9.3* 9.1* 8.8*   HCT 27.4* 26.2* 25.6*   MCV 90.2 88.5 89.4   PLT 50* 20* 4*     BMP/Mag:  Recent Labs     11/13/20 0425 11/14/20 0300 11/15/20  0330    141 136   K 3.9 4.1 3.8    109 106   CO2 24 23 22   PHOS 3.3  --   --    BUN 4* 5* 3*   CREATININE <0.5* <0.5* <0.5*     LIVP:   Recent Labs     11/13/20 0425   AST 20   ALT 25   BILIDIR <0.2   BILITOT 0.3   ALKPHOS 58     Uric Acid:    Recent Labs     11/15/20  0330   LABURIC 1.8*     Coags:   No results for input(s): PROTIME, INR, APTT in the last 72 hours. PROBLEM LIST:            1. IgG Lambda smoldering multiple myeloma  2. JULIETH  3. OA  4. HTN  5. HLD  6. Insomnia  7. Osteopenia  8. Vit D deficiency    Post Transplant Complications:  1. Nausea / Vomiting  2. Diarrhea   3. Orthostatic Hypotension       TREATMENT:            1. RVD x 4 cycles (6/2020 - 9/2020)  2. HD Melphalan 200mg/m2 & ASCT (11/6/20) - 2.94 x10^6 [id number]/kg     ASSESSMENT AND PLAN:            1. IgG Lambda Smoldering Myeloma: CO  - S/p [id number] & ASCT (11/6/20)   - Post-Txp Maintenance: USOR trial - estephania/kika vs kika maintenance  - Post - Txp disease eval:  BMBx w/ FISH & myeloma labs      Day + 9     2. ID:  Afebrile, no evidence of infection.    - Cont Levaquin, Diflucan & Valtrex, cont acyclovir at discharge for VZV positive titer.       3.  Heme: anemia & leukopenia from chemotherapy   - Transfuse for Hgb < 7 and Platelets < 70D  - Platelet transfusion today  - Cont Granix      4. Metabolic: Electrolytes and renal fxn stable   - Cont IVF: NS + KCL 20 meq @ 75 mL/hr  - Replace potassium and magnesium per PRN orders     5. Cardiac:   - H/o HTN & HLD  HTN: intermittent orthostasis   - S/p IVF bolus for symptomatic orthostatic hypotension (11/8/20 & 11/14/20). - S/p amlodipine 5 mg & lisinopril-HCTZ 10-12.5mg (stopped 11/9/20) w/ orthostasis. Resume if BP increases   HLD:    - Hold statin marj-transplant      6. GI / Nutrition:    Nutrition:  Appetite and oral intake is adequate  - Cont MVI  - Cont low microbial diet   - Follow closely with dietary  Nausea / Vomiting:  Delayed CINV has improved since (11/8/20)  - Cont scheduled Compazine 10 mg every QID (started 11/8/20)  - Cont Zyprex 5 mg daily (started 11/8/20)  - Cont Ativan as needed   Diarrhea:  Intermittent  - C. Diff (11/8/20) - negative  - Cont Imodium as needed   Mucositis:  Mild irritation to right side of tongue  - Cont MMW as needed     7. Psych: Insomnia:  No recent complaints   - Cont doxepin 10 mg nightly  - Cont melatonin PRN       - DVT Prophylaxis: Platelets <69,404 cells/dL - prophylactic lovenox on hold and mechanical prophylaxis with bilateral SCDs while in bed in place. Contraindications to pharmacologic prophylaxis: Thrombocytopenia  Contraindications to mechanical prophylaxis: None    - Disposition:  Once ANC >1 and recovered from toxicities of transplant      DOMINGO Akers - EVIE     Lovena Child. Fercho Daigle DO, MS  [facility]    Please contact via:  1. Perfect Serve  2.   Cell Phone:  [phone]    11/15/2020   2:08 PM

## 2020-11-16 ENCOUNTER — APPOINTMENT (OUTPATIENT)
Dept: GENERAL RADIOLOGY | Age: 65
DRG: 016 | End: 2020-11-16
Attending: INTERNAL MEDICINE
Payer: MEDICARE

## 2020-11-16 LAB
ALBUMIN SERPL-MCNC: 3.7 G/DL (ref 3.4–5)
ALP BLD-CCNC: 65 U/L (ref 40–129)
ALT SERPL-CCNC: 23 U/L (ref 10–40)
ANION GAP SERPL CALCULATED.3IONS-SCNC: 10 MMOL/L (ref 3–16)
APTT: 29.5 SEC (ref 24.2–36.2)
AST SERPL-CCNC: 14 U/L (ref 15–37)
BILIRUB SERPL-MCNC: 0.4 MG/DL (ref 0–1)
BILIRUBIN DIRECT: <0.2 MG/DL (ref 0–0.3)
BILIRUBIN, INDIRECT: ABNORMAL MG/DL (ref 0–1)
BUN BLDV-MCNC: 3 MG/DL (ref 7–20)
CALCIUM SERPL-MCNC: 8.1 MG/DL (ref 8.3–10.6)
CHLORIDE BLD-SCNC: 105 MMOL/L (ref 99–110)
CO2: 23 MMOL/L (ref 21–32)
CREAT SERPL-MCNC: <0.5 MG/DL (ref 0.6–1.2)
GFR AFRICAN AMERICAN: >60
GFR NON-AFRICAN AMERICAN: >60
GLUCOSE BLD-MCNC: 98 MG/DL (ref 70–99)
HCT VFR BLD CALC: 25.4 % (ref 36–48)
HEMOGLOBIN: 8.7 G/DL (ref 12–16)
INR BLD: 1.05 (ref 0.86–1.14)
LACTATE DEHYDROGENASE: 124 U/L (ref 100–190)
MAGNESIUM: 2 MG/DL (ref 1.8–2.4)
MCH RBC QN AUTO: 30.5 PG (ref 26–34)
MCHC RBC AUTO-ENTMCNC: 34.3 G/DL (ref 31–36)
MCV RBC AUTO: 89.1 FL (ref 80–100)
PDW BLD-RTO: 13.2 % (ref 12.4–15.4)
PHOSPHORUS: 2.7 MG/DL (ref 2.5–4.9)
PLATELET # BLD: 12 K/UL (ref 135–450)
PMV BLD AUTO: 7.9 FL (ref 5–10.5)
POTASSIUM SERPL-SCNC: 3.6 MMOL/L (ref 3.5–5.1)
PROTHROMBIN TIME: 12.2 SEC (ref 10–13.2)
RBC # BLD: 2.85 M/UL (ref 4–5.2)
SODIUM BLD-SCNC: 138 MMOL/L (ref 136–145)
TOTAL PROTEIN: 5.8 G/DL (ref 6.4–8.2)
URIC ACID, SERUM: 1.8 MG/DL (ref 2.6–6)
WBC # BLD: 0.1 K/UL (ref 4–11)

## 2020-11-16 PROCEDURE — 2580000003 HC RX 258: Performed by: NURSE PRACTITIONER

## 2020-11-16 PROCEDURE — 85610 PROTHROMBIN TIME: CPT

## 2020-11-16 PROCEDURE — 2060000000 HC ICU INTERMEDIATE R&B

## 2020-11-16 PROCEDURE — 6360000002 HC RX W HCPCS: Performed by: NURSE PRACTITIONER

## 2020-11-16 PROCEDURE — 71045 X-RAY EXAM CHEST 1 VIEW: CPT

## 2020-11-16 PROCEDURE — 80076 HEPATIC FUNCTION PANEL: CPT

## 2020-11-16 PROCEDURE — 36592 COLLECT BLOOD FROM PICC: CPT

## 2020-11-16 PROCEDURE — 6370000000 HC RX 637 (ALT 250 FOR IP): Performed by: NURSE PRACTITIONER

## 2020-11-16 PROCEDURE — 85025 COMPLETE CBC W/AUTO DIFF WBC: CPT

## 2020-11-16 PROCEDURE — 84550 ASSAY OF BLOOD/URIC ACID: CPT

## 2020-11-16 PROCEDURE — 83735 ASSAY OF MAGNESIUM: CPT

## 2020-11-16 PROCEDURE — 83615 LACTATE (LD) (LDH) ENZYME: CPT

## 2020-11-16 PROCEDURE — 6370000000 HC RX 637 (ALT 250 FOR IP): Performed by: INTERNAL MEDICINE

## 2020-11-16 PROCEDURE — 84100 ASSAY OF PHOSPHORUS: CPT

## 2020-11-16 PROCEDURE — 85730 THROMBOPLASTIN TIME PARTIAL: CPT

## 2020-11-16 PROCEDURE — 80048 BASIC METABOLIC PNL TOTAL CA: CPT

## 2020-11-16 RX ADMIN — DOXEPIN HYDROCHLORIDE 10 MG: 10 CAPSULE ORAL at 20:08

## 2020-11-16 RX ADMIN — OLANZAPINE 5 MG: 5 TABLET, FILM COATED ORAL at 08:54

## 2020-11-16 RX ADMIN — PROCHLORPERAZINE MALEATE 10 MG: 10 TABLET ORAL at 20:08

## 2020-11-16 RX ADMIN — VALACYCLOVIR HYDROCHLORIDE 500 MG: 500 TABLET, FILM COATED ORAL at 20:08

## 2020-11-16 RX ADMIN — POTASSIUM CHLORIDE AND SODIUM CHLORIDE: 900; 150 INJECTION, SOLUTION INTRAVENOUS at 22:19

## 2020-11-16 RX ADMIN — LEVOFLOXACIN 500 MG: 500 TABLET, FILM COATED ORAL at 20:08

## 2020-11-16 RX ADMIN — Medication 10 ML: at 20:09

## 2020-11-16 RX ADMIN — FLUCONAZOLE 400 MG: 200 TABLET ORAL at 08:54

## 2020-11-16 RX ADMIN — PROCHLORPERAZINE MALEATE 10 MG: 10 TABLET ORAL at 08:54

## 2020-11-16 RX ADMIN — SODIUM CHLORIDE 15 ML: 900 IRRIGANT IRRIGATION at 20:09

## 2020-11-16 RX ADMIN — VALACYCLOVIR HYDROCHLORIDE 500 MG: 500 TABLET, FILM COATED ORAL at 08:54

## 2020-11-16 RX ADMIN — TBO-FILGRASTIM 300 MCG: 300 INJECTION, SOLUTION SUBCUTANEOUS at 18:08

## 2020-11-16 RX ADMIN — PROCHLORPERAZINE MALEATE 10 MG: 10 TABLET ORAL at 16:05

## 2020-11-16 ASSESSMENT — PAIN SCALES - GENERAL
PAINLEVEL_OUTOF10: 0

## 2020-11-16 NOTE — PLAN OF CARE
Problem: Infection - Central Venous Catheter-Associated Bloodstream Infection:  Goal: Will show no infection signs and symptoms  Description: Will show no infection signs and symptoms  11/16/2020 1429 by Samina Savage RN  Outcome: Met This Shift  Note: CVC site remains free of signs/symptoms of infection. No drainage, edema, erythema, pain, or warmth noted at site. Dressing changes continue per protocol and on an as needed basis - see flowsheet. Compliant with [facility] Bath Protocol:  Performed CHG bath today per [facility] protocol utilizing CHG solution in the shower. CVC site cleansed with CHG wipe over dressing, skin surrounding dressing, and first 6\" of IV tubing. Pt tolerated well. Continued to encourage daily CHG bathing per [facility] protocol. Problem: Nausea/Vomiting:  Goal: Ability to achieve adequate nutritional intake will improve  Description: Ability to achieve adequate nutritional intake will improve  11/16/2020 1429 by Samina Savage RN  Outcome: Met This Shift  Note: Appetite improved. Pt denies nausea, scheduled antiemetic held.   Will monitor

## 2020-11-16 NOTE — PLAN OF CARE
Problem: Falls - Risk of:  Goal: Will remain free from falls  Description: Will remain free from falls  Outcome: Ongoing  Note: Orthostatic vital signs obtained at start of shift - see flowsheet for details. Pt does not meet criteria for orthostasis. Pt is a Med fall risk. See [place] Fall Score and ABCDS Injury Risk assessments. Pt bed is in low position, side rails up, call light and belongings are in reach. Fall risk light is on outside pts room. Pt encouraged to call for assistance as needed. Will continue with hourly rounds for PO intake, pain needs, toileting and repositioning as needed. Problem: Bleeding:  Goal: Will show no signs and symptoms of excessive bleeding  Description: Will show no signs and symptoms of excessive bleeding  Outcome: Ongoing  Note:   Patient's hemoglobin this AM:   Recent Labs     11/16/20 0314   HGB 8.7*     Patient's platelet count this AM:   Recent Labs     11/16/20 0314   PLT 12*    Thrombocytopenia Precautions in place. Patient showing no signs or symptoms of active bleeding. Transfusion not indicated at this time. Patient verbalizes understanding of all instructions. Will continue to assess and implement POC. Call light within reach and hourly rounding in place. Problem: PROTECTIVE PRECAUTIONS  Goal: Patient will remain free of nosocomial Infections  Outcome: Ongoing  Note: Pt remains in protective precautions. No living plants or fresh flowers in his/her room. Patient educated on wearing mask when in hallways. Patient, staff, and visitors adhering to handwashing guidelines. Patient cleansed with chlorhexidine wipes and linens changed daily per protocol. Pt verbalizes understanding of low microbial diet. Patient remains free of nosocomial infections.          Problem: Infection - Central Venous Catheter-Associated Bloodstream Infection:  Goal: Will show no infection signs and symptoms  Description: Will show no infection signs and symptoms  Outcome:

## 2020-11-16 NOTE — BH NOTE
Student attended clinical rounds with team and remained after to introduce himself and speak with pt. Pt has no major concerns to report. Will continue to see pt for routine visits through hospital stay.

## 2020-11-16 NOTE — PROGRESS NOTES
[address] [place] [address]  Autologous Progress Note    2020    Osman Noyola    :  1955    MRN:  [id number]    Referring MD: Alfonso Josue MD  [address]      Subjective:  More energy today. No new complaints. ECOG PS:  (1) Restricted in physically strenuous activity, ambulatory and able to do work of light nature    KPS: 80% Normal activity with effort; some signs or symptoms of disease    Isolation:  None     Medications    Scheduled Meds:   levoFLOXacin  500 mg Oral Nightly    prochlorperazine  10 mg Oral 4x Daily    Or    prochlorperazine  10 mg Intravenous 4x Daily    OLANZapine  5 mg Oral Daily    tbo-filgrastim  300 mcg Subcutaneous QPM    fluconazole  400 mg Oral Daily    Saline Mouthwash  15 mL Swish & Spit 4x Daily AC & HS    sodium chloride flush  10 mL Intravenous 2 times per day    valACYclovir  500 mg Oral BID    doxepin  10 mg Oral Nightly     Continuous Infusions:   0.9% NaCl with KCl 20 mEq 75 mL/hr at 11/15/20 2127    sodium chloride       PRN Meds:.magic (miracle) mouthwash, [COMPLETED] loperamide **FOLLOWED BY** loperamide, calcium carbonate, morphine, sodium chloride, melatonin, alteplase, magnesium hydroxide, magnesium sulfate, potassium chloride, Saline Mouthwash, LORazepam **OR** LORazepam    ROS:  As noted above, otherwise remainder of 10-point ROS negative    Physical Exam:     I&O:      Intake/Output Summary (Last 24 hours) at 2020 0940  Last data filed at 2020 0856  Gross per 24 hour   Intake 2318 ml   Output 4300 ml   Net -1982 ml       Vital Signs:  BP (!) 109/97   Pulse 97   Temp 98.1 °F (36.7 °C) (Oral)   Resp 20   Ht 5' 3\" (1.6 m)   Wt 142 lb 4.8 oz (64.5 kg)   SpO2 95%   BMI 25.21 kg/m²     Weight:    Wt Readings from Last 3 Encounters:   20 142 lb 4.8 oz (64.5 kg)   10/27/20 144 lb (65.3 kg)   10/16/20 149 lb 7.6 oz (67.8 kg)       General: Awake, alert and oriented.   HEENT: normocephalic,

## 2020-11-17 LAB
ANION GAP SERPL CALCULATED.3IONS-SCNC: 8 MMOL/L (ref 3–16)
BANDED NEUTROPHILS RELATIVE PERCENT: 8 % (ref 0–7)
BASOPHILS ABSOLUTE: 0 K/UL (ref 0–0.2)
BASOPHILS RELATIVE PERCENT: 0 %
BUN BLDV-MCNC: 3 MG/DL (ref 7–20)
CALCIUM SERPL-MCNC: 8.1 MG/DL (ref 8.3–10.6)
CHLORIDE BLD-SCNC: 103 MMOL/L (ref 99–110)
CO2: 23 MMOL/L (ref 21–32)
CREAT SERPL-MCNC: <0.5 MG/DL (ref 0.6–1.2)
EOSINOPHILS ABSOLUTE: 0 K/UL (ref 0–0.6)
EOSINOPHILS RELATIVE PERCENT: 0 %
GFR AFRICAN AMERICAN: >60
GFR NON-AFRICAN AMERICAN: >60
GLUCOSE BLD-MCNC: 97 MG/DL (ref 70–99)
HCT VFR BLD CALC: 25.2 % (ref 36–48)
HEMOGLOBIN: 8.8 G/DL (ref 12–16)
LYMPHOCYTES ABSOLUTE: 0.1 K/UL (ref 1–5.1)
LYMPHOCYTES RELATIVE PERCENT: 12 %
MCH RBC QN AUTO: 30.8 PG (ref 26–34)
MCHC RBC AUTO-ENTMCNC: 34.8 G/DL (ref 31–36)
MCV RBC AUTO: 88.4 FL (ref 80–100)
METAMYELOCYTES RELATIVE PERCENT: 2 %
MONOCYTES ABSOLUTE: 0.2 K/UL (ref 0–1.3)
MONOCYTES RELATIVE PERCENT: 24 %
NEUTROPHILS ABSOLUTE: 0.6 K/UL (ref 1.7–7.7)
NEUTROPHILS RELATIVE PERCENT: 54 %
OVALOCYTES: ABNORMAL
PDW BLD-RTO: 13.2 % (ref 12.4–15.4)
PLATELET # BLD: 12 K/UL (ref 135–450)
PMV BLD AUTO: 8 FL (ref 5–10.5)
POTASSIUM SERPL-SCNC: 3.5 MMOL/L (ref 3.5–5.1)
RBC # BLD: 2.85 M/UL (ref 4–5.2)
SODIUM BLD-SCNC: 134 MMOL/L (ref 136–145)
URIC ACID, SERUM: 2.2 MG/DL (ref 2.6–6)
WBC # BLD: 0.9 K/UL (ref 4–11)

## 2020-11-17 PROCEDURE — 2580000003 HC RX 258: Performed by: NURSE PRACTITIONER

## 2020-11-17 PROCEDURE — 6370000000 HC RX 637 (ALT 250 FOR IP): Performed by: NURSE PRACTITIONER

## 2020-11-17 PROCEDURE — 6370000000 HC RX 637 (ALT 250 FOR IP): Performed by: INTERNAL MEDICINE

## 2020-11-17 PROCEDURE — 80048 BASIC METABOLIC PNL TOTAL CA: CPT

## 2020-11-17 PROCEDURE — 6360000002 HC RX W HCPCS: Performed by: NURSE PRACTITIONER

## 2020-11-17 PROCEDURE — 2580000003 HC RX 258: Performed by: INTERNAL MEDICINE

## 2020-11-17 PROCEDURE — 2060000000 HC ICU INTERMEDIATE R&B

## 2020-11-17 PROCEDURE — 36592 COLLECT BLOOD FROM PICC: CPT

## 2020-11-17 PROCEDURE — 85025 COMPLETE CBC W/AUTO DIFF WBC: CPT

## 2020-11-17 PROCEDURE — 84550 ASSAY OF BLOOD/URIC ACID: CPT

## 2020-11-17 RX ORDER — 0.9 % SODIUM CHLORIDE 0.9 %
500 INTRAVENOUS SOLUTION INTRAVENOUS ONCE
Status: COMPLETED | OUTPATIENT
Start: 2020-11-17 | End: 2020-11-17

## 2020-11-17 RX ORDER — DIPHENHYDRAMINE HCL 25 MG
25 TABLET ORAL EVERY 6 HOURS PRN
Status: DISCONTINUED | OUTPATIENT
Start: 2020-11-17 | End: 2020-11-19 | Stop reason: HOSPADM

## 2020-11-17 RX ADMIN — PROCHLORPERAZINE MALEATE 10 MG: 10 TABLET ORAL at 12:26

## 2020-11-17 RX ADMIN — PROCHLORPERAZINE MALEATE 10 MG: 10 TABLET ORAL at 16:56

## 2020-11-17 RX ADMIN — FLUCONAZOLE 400 MG: 200 TABLET ORAL at 08:01

## 2020-11-17 RX ADMIN — VALACYCLOVIR HYDROCHLORIDE 500 MG: 500 TABLET, FILM COATED ORAL at 08:02

## 2020-11-17 RX ADMIN — PROCHLORPERAZINE MALEATE 10 MG: 10 TABLET ORAL at 08:02

## 2020-11-17 RX ADMIN — SODIUM CHLORIDE 15 ML: 900 IRRIGANT IRRIGATION at 10:58

## 2020-11-17 RX ADMIN — OLANZAPINE 5 MG: 5 TABLET, FILM COATED ORAL at 08:02

## 2020-11-17 RX ADMIN — Medication 10 ML: at 20:17

## 2020-11-17 RX ADMIN — TBO-FILGRASTIM 300 MCG: 300 INJECTION, SOLUTION SUBCUTANEOUS at 17:44

## 2020-11-17 RX ADMIN — DOXEPIN HYDROCHLORIDE 10 MG: 10 CAPSULE ORAL at 20:17

## 2020-11-17 RX ADMIN — SODIUM CHLORIDE 15 ML: 900 IRRIGANT IRRIGATION at 20:18

## 2020-11-17 RX ADMIN — POTASSIUM CHLORIDE AND SODIUM CHLORIDE: 900; 150 INJECTION, SOLUTION INTRAVENOUS at 12:26

## 2020-11-17 RX ADMIN — LOPERAMIDE HYDROCHLORIDE 2 MG: 2 CAPSULE ORAL at 16:56

## 2020-11-17 RX ADMIN — SODIUM CHLORIDE 500 ML: 9 INJECTION, SOLUTION INTRAVENOUS at 10:59

## 2020-11-17 RX ADMIN — VALACYCLOVIR HYDROCHLORIDE 500 MG: 500 TABLET, FILM COATED ORAL at 20:17

## 2020-11-17 RX ADMIN — Medication 10 ML: at 08:02

## 2020-11-17 RX ADMIN — PROCHLORPERAZINE MALEATE 10 MG: 10 TABLET ORAL at 20:18

## 2020-11-17 RX ADMIN — LEVOFLOXACIN 500 MG: 500 TABLET, FILM COATED ORAL at 20:18

## 2020-11-17 ASSESSMENT — PAIN SCALES - GENERAL
PAINLEVEL_OUTOF10: 0

## 2020-11-17 NOTE — PLAN OF CARE
Problem: Falls - Risk of:  Goal: Will remain free from falls  Description: Will remain free from falls  11/14/2020 1212 by Susan Lopez RN  Outcome: Ongoing   Orthostatic vital signs obtained at start of shift - see flowsheet for details. Pt does not meet criteria for orthostasis. Pt is a Med fall risk. See [place] Fall Score and ABCDS Injury Risk assessments. - Screening for Orthostasis AND not a [place] Risk per COPPOLA/ABCDS: Pt bed is in low position, side rails up, call light and belongings are in reach. Fall risk light is on outside pts room. Pt encouraged to call for assistance as needed. Will continue with hourly rounds for PO intake, pain needs, toileting and repositioning as needed. Pt did c/o slight dizziness after a.m. shower. PT was rechecked for ortho VS.  Pt feels better. MD was notified and 500 ml bolus being administered at this time. Problem: Bleeding:  Goal: Will show no signs and symptoms of excessive bleeding  Description: Will show no signs and symptoms of excessive bleeding  11/14/2020 1212 by Susan Lopez RN  Outcome: Ongoing    Patient's hemoglobin this AM:   Recent Labs     11/17/20  0303   HGB 8.8*     Patient's platelet count this AM:   Recent Labs     11/17/20  0303   PLT 12*    Thrombocytopenia Precautions in place. Patient showing no signs or symptoms of active bleeding. Nightshift RN transfused platelets per standing orders. Patient verbalizes understanding of all instructions. Will continue to assess and implement POC. Call light within reach and hourly rounding in place. Problem: PROTECTIVE PRECAUTIONS  Goal: Patient will remain free of nosocomial Infections  11/14/2020 1212 by Susan Lopez RN  Outcome: Ongoing    CVC site remains free of signs/symptoms of infection. No drainage, edema, erythema, pain, or warmth noted at site. Dressing changes continue per protocol and on an as needed basis - see flowsheet.      Compliant with [facility] Bath

## 2020-11-17 NOTE — PLAN OF CARE
edema, erythema, pain, or warmth noted at site. Dressing changes continue per protocol and on an as needed basis - see flowsheet. Compliant with [facility] Bath Protocol:  Performed CHG bath today per [facility] protocol utilizing CHG solution in the shower. CVC site cleansed with CHG wipe over dressing, skin surrounding dressing, and first 6\" of IV tubing. Pt tolerated well. Continued to encourage daily CHG bathing per [employer] protocol. Problem: Nausea/Vomiting:  Goal: Ability to achieve adequate nutritional intake will improve  Description: Ability to achieve adequate nutritional intake will improve  Outcome: Ongoing  Note: Patient has had no complaints of nausea or vomiting this shift. Will continue to monitor.

## 2020-11-17 NOTE — PROGRESS NOTES
[address] [facility]  Autologous Progress Note    2020    Erick Wellington    :  1955    MRN:  [id number]    Referring MD: Stephanie Clay MD  [address]  [facility]      Subjective:  More energy today. No new complaints. ECOG PS:  (1) Restricted in physically strenuous activity, ambulatory and able to do work of light nature    KPS: 80% Normal activity with effort; some signs or symptoms of disease    Isolation:  None     Medications    Scheduled Meds:   levoFLOXacin  500 mg Oral Nightly    prochlorperazine  10 mg Oral 4x Daily    Or    prochlorperazine  10 mg Intravenous 4x Daily    OLANZapine  5 mg Oral Daily    tbo-filgrastim  300 mcg Subcutaneous QPM    fluconazole  400 mg Oral Daily    Saline Mouthwash  15 mL Swish & Spit 4x Daily AC & HS    sodium chloride flush  10 mL Intravenous 2 times per day    valACYclovir  500 mg Oral BID    doxepin  10 mg Oral Nightly     Continuous Infusions:   0.9% NaCl with KCl 20 mEq 75 mL/hr at 20 2219    sodium chloride       PRN Meds:.magic (miracle) mouthwash, [COMPLETED] loperamide **FOLLOWED BY** loperamide, calcium carbonate, morphine, sodium chloride, melatonin, alteplase, magnesium hydroxide, magnesium sulfate, potassium chloride, Saline Mouthwash, LORazepam **OR** LORazepam    ROS:  As noted above, otherwise remainder of 10-point ROS negative    Physical Exam:     I&O:      Intake/Output Summary (Last 24 hours) at 2020 1043  Last data filed at 2020 0803  Gross per 24 hour   Intake 2102 ml   Output 3800 ml   Net -1698 ml       Vital Signs:  /72   Pulse 93   Temp 97.5 °F (36.4 °C) (Oral)   Resp 17   Ht 5' 3\" (1.6 m)   Wt 141 lb 12.8 oz (64.3 kg)   SpO2 100%   BMI 25.12 kg/m²     Weight:    Wt Readings from Last 3 Encounters:   20 141 lb 12.8 oz (64.3 kg)   10/27/20 144 lb (65.3 kg)   10/16/20 149 lb 7.6 oz (67.8 kg)       General: Awake, alert and oriented.   HEENT: normocephalic, leukopenia from chemotherapy   - Transfuse for Hgb < 7 and Platelets < 22K  - Cont Granix      4. Metabolic: Electrolytes and renal fxn stable   - Cont IVF: NS + KCL 20 meq @ 75 mL/hr  - Replace potassium and magnesium per PRN orders     5. Cardiac:   - H/o HTN & HLD  HTN: intermittent orthostasis   - S/p IVF bolus for symptomatic orthostatic hypotension (11/8/20 & 11/14/20). - S/p amlodipine 5 mg & lisinopril-HCTZ 10-12.5mg (stopped 11/9/20) w/ orthostasis. Resume if BP increases   HLD:    - Hold statin marj-transplant      6. GI / Nutrition:    Nutrition:  Appetite and oral intake is adequate  - Cont MVI  - Cont low microbial diet   - Follow closely with dietary  Nausea / Vomiting:  Delayed CINV has improved since (11/8/20)  - Cont scheduled Compazine 10 mg every QID (started 11/8/20)  - Cont Zyprex 5 mg daily (started 11/8/20)  - Cont Ativan as needed   Diarrhea:  Intermittent  - C. Diff (11/8/20) - negative  - Cont Imodium as needed   Mucositis:  Mild irritation to right side of tongue  - Cont MMW as needed     7. Psych: Insomnia:  No recent complaints   - Cont doxepin 10 mg nightly  - Cont melatonin PRN       - DVT Prophylaxis: Platelets <63,311 cells/dL - prophylactic lovenox on hold and mechanical prophylaxis with bilateral SCDs while in bed in place. Contraindications to pharmacologic prophylaxis: Thrombocytopenia  Contraindications to mechanical prophylaxis: None    - Disposition:  Once ANC >1 and recovered from toxicities of transplant      Rivera Brown DO, MS  [facility]    Please contact via:  1. Perfect Serve  2.   Cell Phone:  [phone]    11/17/2020   10:43 AM

## 2020-11-17 NOTE — PROGRESS NOTES
Goals:Goals: pt will maintain adequate po intake by consuming greater than 75% of meals to promote meeting increased nutrient needs. Monitoring: Diet Tolerance , Meal Intake , Pertinent Labs  or Supplement Intake      OBJECTIVE DATA:  · Nutrition-Focused Physical Findings: lbm 11/14; no edema;   · Wounds None      Past Medical History:   Diagnosis Date    Cancer ([facility] [address]     Hyperlipidemia     Hypertension     PONV (postoperative nausea and vomiting)         ANTHROPOMETRICS  Current Height: 5' 3\" (160 cm)  Current Weight: 141 lb 12.8 oz (64.3 kg)    Admission weight: 144 lb (65.3 kg)  Ideal Bodyweight 115 lb    Usual Bodyweight 134 per pt  Weight Changes no significant changes       BMI BMI (Calculated): 25.2    Wt Readings from Last 50 Encounters:   11/17/20 141 lb 12.8 oz (64.3 kg)   10/27/20 144 lb (65.3 kg)   10/16/20 149 lb 7.6 oz (67.8 kg)   10/05/20 147 lb (66.7 kg)   06/09/20 141 lb (64 kg)   06/04/20 144 lb (65.3 kg)   06/06/18 135 lb (61.2 kg)   10/09/17 136 lb 12.8 oz (62.1 kg)       COMPARATIVE STANDARDS  Estimated Total Kcals/Day : 20-25 Admission Bodyweight  (65.3 kg) 7657-2364 kcal    Estimated Total Protein (g/day) : 1.3-1.5 Ideal Bodyweight  (52.3 kg) 68-78g/day  Estimated Daily Total Fluid (ml/day): 5155-9926 mL per day     Food / Nutrition-Related History  Pre-Admission / Home Diet:  Pre-Admission/Home Diet: General(low microbial)   Home Supplements / Herbals:    none noted  Food Restrictions / Cultural Requests:    none noted    Current Nutrition Therapies   DIET GENERAL;  Dietary Nutrition Supplements: Standard High Calorie Oral Supplement, Clear Liquid Oral Supplement     PO Intake: 51-75% and %  PO Supplement: Standard High Calorie   Clear Liquid Supplement    PO Supplement Intake: 0%  and 26-50%  IVF: NS + 20mEq KCl @ 75 ml/hr     NUTRITION RISK LEVEL: Risk Level:  Moderate     Kylah Marr RD, LD  Ethel:  [phone]  Office:  [phone]

## 2020-11-18 LAB
ALBUMIN SERPL-MCNC: 3.6 G/DL (ref 3.4–5)
ALP BLD-CCNC: 61 U/L (ref 40–129)
ALT SERPL-CCNC: 16 U/L (ref 10–40)
ANION GAP SERPL CALCULATED.3IONS-SCNC: 9 MMOL/L (ref 3–16)
AST SERPL-CCNC: 10 U/L (ref 15–37)
BANDED NEUTROPHILS RELATIVE PERCENT: 3 % (ref 0–7)
BASOPHILS ABSOLUTE: 0 K/UL (ref 0–0.2)
BASOPHILS RELATIVE PERCENT: 0 %
BILIRUB SERPL-MCNC: 0.3 MG/DL (ref 0–1)
BILIRUBIN DIRECT: <0.2 MG/DL (ref 0–0.3)
BILIRUBIN, INDIRECT: ABNORMAL MG/DL (ref 0–1)
BUN BLDV-MCNC: 3 MG/DL (ref 7–20)
CALCIUM SERPL-MCNC: 8.1 MG/DL (ref 8.3–10.6)
CHLORIDE BLD-SCNC: 107 MMOL/L (ref 99–110)
CO2: 23 MMOL/L (ref 21–32)
CREAT SERPL-MCNC: <0.5 MG/DL (ref 0.6–1.2)
EOSINOPHILS ABSOLUTE: 0 K/UL (ref 0–0.6)
EOSINOPHILS RELATIVE PERCENT: 0 %
GFR AFRICAN AMERICAN: >60
GFR NON-AFRICAN AMERICAN: >60
GLUCOSE BLD-MCNC: 101 MG/DL (ref 70–99)
HCT VFR BLD CALC: 24.3 % (ref 36–48)
HEMOGLOBIN: 8.6 G/DL (ref 12–16)
LACTATE DEHYDROGENASE: 139 U/L (ref 100–190)
LYMPHOCYTES ABSOLUTE: 0.2 K/UL (ref 1–5.1)
LYMPHOCYTES RELATIVE PERCENT: 8 %
MCH RBC QN AUTO: 31 PG (ref 26–34)
MCHC RBC AUTO-ENTMCNC: 35.2 G/DL (ref 31–36)
MCV RBC AUTO: 88.2 FL (ref 80–100)
MONOCYTES ABSOLUTE: 0.2 K/UL (ref 0–1.3)
MONOCYTES RELATIVE PERCENT: 5 %
NEUTROPHILS ABSOLUTE: 2.6 K/UL (ref 1.7–7.7)
NEUTROPHILS RELATIVE PERCENT: 84 %
PDW BLD-RTO: 13 % (ref 12.4–15.4)
PHOSPHORUS: 2.6 MG/DL (ref 2.5–4.9)
PLATELET # BLD: 16 K/UL (ref 135–450)
PMV BLD AUTO: 7.9 FL (ref 5–10.5)
POTASSIUM SERPL-SCNC: 3.4 MMOL/L (ref 3.5–5.1)
RBC # BLD: 2.76 M/UL (ref 4–5.2)
RBC # BLD: NORMAL 10*6/UL
SODIUM BLD-SCNC: 139 MMOL/L (ref 136–145)
TOTAL PROTEIN: 5.6 G/DL (ref 6.4–8.2)
URIC ACID, SERUM: 2.3 MG/DL (ref 2.6–6)
WBC # BLD: 3 K/UL (ref 4–11)

## 2020-11-18 PROCEDURE — 6370000000 HC RX 637 (ALT 250 FOR IP): Performed by: INTERNAL MEDICINE

## 2020-11-18 PROCEDURE — 6360000002 HC RX W HCPCS: Performed by: NURSE PRACTITIONER

## 2020-11-18 PROCEDURE — 36592 COLLECT BLOOD FROM PICC: CPT

## 2020-11-18 PROCEDURE — 80076 HEPATIC FUNCTION PANEL: CPT

## 2020-11-18 PROCEDURE — 85025 COMPLETE CBC W/AUTO DIFF WBC: CPT

## 2020-11-18 PROCEDURE — 6370000000 HC RX 637 (ALT 250 FOR IP): Performed by: NURSE PRACTITIONER

## 2020-11-18 PROCEDURE — 83615 LACTATE (LD) (LDH) ENZYME: CPT

## 2020-11-18 PROCEDURE — 80048 BASIC METABOLIC PNL TOTAL CA: CPT

## 2020-11-18 PROCEDURE — 84100 ASSAY OF PHOSPHORUS: CPT

## 2020-11-18 PROCEDURE — 2580000003 HC RX 258: Performed by: NURSE PRACTITIONER

## 2020-11-18 PROCEDURE — 2060000000 HC ICU INTERMEDIATE R&B

## 2020-11-18 PROCEDURE — 84550 ASSAY OF BLOOD/URIC ACID: CPT

## 2020-11-18 RX ORDER — POTASSIUM CHLORIDE 20 MEQ/1
20 TABLET, EXTENDED RELEASE ORAL 2 TIMES DAILY WITH MEALS
Status: DISCONTINUED | OUTPATIENT
Start: 2020-11-18 | End: 2020-11-19 | Stop reason: HOSPADM

## 2020-11-18 RX ORDER — PROCHLORPERAZINE MALEATE 10 MG
10 TABLET ORAL EVERY 6 HOURS PRN
Status: DISCONTINUED | OUTPATIENT
Start: 2020-11-18 | End: 2020-11-19 | Stop reason: HOSPADM

## 2020-11-18 RX ORDER — PROCHLORPERAZINE EDISYLATE 5 MG/ML
10 INJECTION INTRAMUSCULAR; INTRAVENOUS EVERY 6 HOURS PRN
Status: DISCONTINUED | OUTPATIENT
Start: 2020-11-18 | End: 2020-11-19 | Stop reason: HOSPADM

## 2020-11-18 RX ORDER — ACETAMINOPHEN 325 MG/1
650 TABLET ORAL EVERY 6 HOURS PRN
Status: DISCONTINUED | OUTPATIENT
Start: 2020-11-18 | End: 2020-11-19 | Stop reason: HOSPADM

## 2020-11-18 RX ADMIN — OLANZAPINE 5 MG: 5 TABLET, FILM COATED ORAL at 08:14

## 2020-11-18 RX ADMIN — POTASSIUM CHLORIDE 20 MEQ: 29.8 INJECTION, SOLUTION INTRAVENOUS at 08:14

## 2020-11-18 RX ADMIN — ACETAMINOPHEN 650 MG: 325 TABLET ORAL at 01:38

## 2020-11-18 RX ADMIN — POTASSIUM CHLORIDE 20 MEQ: 1500 TABLET, EXTENDED RELEASE ORAL at 17:57

## 2020-11-18 RX ADMIN — POTASSIUM CHLORIDE 20 MEQ: 29.8 INJECTION, SOLUTION INTRAVENOUS at 05:01

## 2020-11-18 RX ADMIN — VALACYCLOVIR HYDROCHLORIDE 500 MG: 500 TABLET, FILM COATED ORAL at 08:14

## 2020-11-18 RX ADMIN — VALACYCLOVIR HYDROCHLORIDE 500 MG: 500 TABLET, FILM COATED ORAL at 20:02

## 2020-11-18 RX ADMIN — DOXEPIN HYDROCHLORIDE 10 MG: 10 CAPSULE ORAL at 20:02

## 2020-11-18 RX ADMIN — POTASSIUM CHLORIDE 20 MEQ: 29.8 INJECTION, SOLUTION INTRAVENOUS at 07:12

## 2020-11-18 RX ADMIN — SODIUM CHLORIDE 15 ML: 900 IRRIGANT IRRIGATION at 06:05

## 2020-11-18 RX ADMIN — PROCHLORPERAZINE MALEATE 10 MG: 10 TABLET ORAL at 08:14

## 2020-11-18 RX ADMIN — SODIUM CHLORIDE 15 ML: 900 IRRIGANT IRRIGATION at 20:27

## 2020-11-18 RX ADMIN — POTASSIUM CHLORIDE 20 MEQ: 29.8 INJECTION, SOLUTION INTRAVENOUS at 06:04

## 2020-11-18 RX ADMIN — POTASSIUM CHLORIDE AND SODIUM CHLORIDE: 900; 150 INJECTION, SOLUTION INTRAVENOUS at 03:01

## 2020-11-18 RX ADMIN — TBO-FILGRASTIM 300 MCG: 300 INJECTION, SOLUTION SUBCUTANEOUS at 17:57

## 2020-11-18 RX ADMIN — Medication 10 ML: at 20:27

## 2020-11-18 ASSESSMENT — PAIN SCALES - GENERAL
PAINLEVEL_OUTOF10: 3
PAINLEVEL_OUTOF10: 0

## 2020-11-18 NOTE — PROGRESS NOTES
[facility]  Autologous Progress Note    2020    Xiomara Hebert    :  1955    MRN:  [id number]    Referring MD: Tomas Dinh MD  [address]  [address] [place] [address]      Subjective:  More energy today. Feels better. No new complaints. ECOG PS:  (1) Restricted in physically strenuous activity, ambulatory and able to do work of light nature    KPS: 80% Normal activity with effort; some signs or symptoms of disease    Isolation:  None     Medications    Scheduled Meds:   levoFLOXacin  500 mg Oral Nightly    prochlorperazine  10 mg Oral 4x Daily    Or    prochlorperazine  10 mg Intravenous 4x Daily    OLANZapine  5 mg Oral Daily    tbo-filgrastim  300 mcg Subcutaneous QPM    Saline Mouthwash  15 mL Swish & Spit 4x Daily AC & HS    sodium chloride flush  10 mL Intravenous 2 times per day    valACYclovir  500 mg Oral BID    doxepin  10 mg Oral Nightly     Continuous Infusions:   0.9% NaCl with KCl 20 mEq 75 mL/hr at 20 0301    sodium chloride       PRN Meds:.acetaminophen, diphenhydrAMINE, magic (miracle) mouthwash, [COMPLETED] loperamide **FOLLOWED BY** loperamide, calcium carbonate, morphine, sodium chloride, melatonin, alteplase, magnesium hydroxide, magnesium sulfate, potassium chloride, Saline Mouthwash, LORazepam **OR** LORazepam    ROS:  As noted above, otherwise remainder of 10-point ROS negative    Physical Exam:     I&O:      Intake/Output Summary (Last 24 hours) at 2020 0703  Last data filed at 2020 0634  Gross per 24 hour   Intake 2810 ml   Output 4300 ml   Net -1490 ml       Vital Signs:  /64   Pulse 91   Temp 99.1 °F (37.3 °C) (Oral)   Resp 18   Ht 5' 3\" (1.6 m)   Wt 141 lb 12.8 oz (64.3 kg)   SpO2 96%   BMI 25.12 kg/m²     Weight:    Wt Readings from Last 3 Encounters:   20 141 lb 12.8 oz (64.3 kg)   10/27/20 144 lb (65.3 kg)   10/16/20 149 lb 7.6 oz (67.8 kg)       General: Awake, alert and oriented.   HEENT: normocephalic, PERRL, no scleral erythema or icterus, Oral mucosa moist w/out ulceration and throat clear  NECK: supple   BACK: Straight   SKIN: warm dry and intact without lesions rashes or masses  CHEST: CTA bilaterally without use of accessory muscles  CV: Normal S1 S2, RRR, no MRG  ABD: NT ND normoactive BS, no palpable masses or hepatosplenomegaly  EXTREMITIES: without edema, denies calf tenderness  NEURO: CN II - XII grossly intact  CATHETER: Right IJ Trifusion (IR, 10/27/20) - CDI    Data:   CBC:   Recent Labs     11/16/20 0314 11/17/20  0303 11/18/20  0308   WBC 0.1* 0.9* 3.0*   HGB 8.7* 8.8* 8.6*   HCT 25.4* 25.2* 24.3*   MCV 89.1 88.4 88.2   PLT 12* 12* 16*     BMP/Mag:  Recent Labs     11/16/20 0314 11/17/20  0303 11/18/20  0308    134* 139   K 3.6 3.5 3.4*    103 107   CO2 23 23 23   PHOS 2.7  --  2.6   BUN 3* 3* 3*   CREATININE <0.5* <0.5* <0.5*   MG 2.00  --   --      LIVP:   Recent Labs     11/16/20 0314 11/18/20  0308   AST 14* 10*   ALT 23 16   BILIDIR <0.2 <0.2   BILITOT 0.4 0.3   ALKPHOS 65 61     Uric Acid:    Recent Labs     11/18/20  0308   LABURIC 2.3*     Coags:   Recent Labs     11/16/20 0314   PROTIME 12.2   INR 1.05   APTT 29.5       PROBLEM LIST:            1. IgG Lambda smoldering multiple myeloma  2. JULIETH  3. OA  4. HTN  5. HLD  6. Insomnia  7. Osteopenia  8. Vit D deficiency    Post Transplant Complications:  1. Nausea / Vomiting  2. Diarrhea   3. Orthostatic Hypotension       TREATMENT:            1. RVD x 4 cycles (6/2020 - 9/2020)  2. HD Melphalan 200mg/m2 & ASCT (11/6/20) - 2.94 x10^6 [id number]/kg     ASSESSMENT AND PLAN:            1. IgG Lambda Smoldering Myeloma: AR  - S/p [id number] & ASCT (11/6/20)   - Post-Txp Maintenance: USOR trial - estephania/kika vs kika maintenance  - Post - Txp disease eval:  BMBx w/ FISH & myeloma labs      Day +12     2. ID:  Afebrile, no evidence of infection.    - Cont Valtrex, cont acyclovir at discharge for VZV positive titer.     - Stop Fluconazole and Levaquin     3. Heme: Pancytopenia from chemotherapy   - Transfuse for Hgb < 7 and Platelets < 94U  - Cont Granix      4. Metabolic: Electrolytes and renal fxn stable except hypoK+  - Stop IVF: NS + KCL 20 meq @ 75 mL/hr  - Replace potassium and magnesium per PRN orders     5. Cardiac:   - H/o HTN & HLD  HTN: intermittent orthostasis   - S/p IVF bolus for symptomatic orthostatic hypotension (11/8/20 & 11/14/20). - S/p amlodipine 5 mg & lisinopril-HCTZ 10-12.5mg (stopped 11/9/20) w/ orthostasis. Resume if BP increases   HLD:    - Hold statin marj-transplant      6. GI / Nutrition:    Nutrition:  Appetite and oral intake is adequate  - Cont MVI  - Cont low microbial diet   - Follow closely with dietary  Nausea / Vomiting:  Delayed CINV has improved since (11/8/20)  - PRN Compazine 10 mg every QID (started 11/8/20)  - Stop Zyprex 5 mg daily (started 11/8/20)  - Cont Ativan as needed   Diarrhea:  Intermittent  - C. Diff (11/8/20) - negative  - Cont Imodium as needed   Mucositis:  Mild irritation to right side of tongue  - Cont MMW as needed     7. Psych: Insomnia:  No recent complaints   - Cont doxepin 10 mg nightly  - Cont melatonin PRN       - DVT Prophylaxis: Platelets <43,244 cells/dL - prophylactic lovenox on hold and mechanical prophylaxis with bilateral SCDs while in bed in place. Contraindications to pharmacologic prophylaxis: Thrombocytopenia  Contraindications to mechanical prophylaxis: None    - Disposition:  Once ANC >1 and recovered from toxicities of transplant    Mary Vieira APRN - EVIE Flowers. Minerva Pompa DO, MS  [facility]    Please contact via:  1. Perfect Serve  2.   Cell Phone:  [phone]    11/18/2020   7:03 AM

## 2020-11-18 NOTE — CARE COORDINATION
Type of Admission  Multiple Myeloma IgG  Melphalan Auto ( 3 Aliquots, T:0, 11/6)  Day +12      Central venous catheter  Right IJ Tri fusion (10/27/20, IR)        Plan    Proceed with Melphalan Auto SCT for treatment of Multiple Myeloma      Update  11/4/20:  Planned admission for Melphalan Auto SCT. Understands need for cryotherapy with Melphalan. 11/9/20:  Increase nausea yesterday, compazine is now scheduled qid. Keeping diet bland for now. 11/16/20:  Feeling much better, no nausea. 11/17/20:  Counts improving, WBC is 0.9.  11/18/20:  ANC is 2.3 today, did have some nausea today. Anticipate discharge tomorrow if no toxicities. Education  11/4/20: Introduced myself in RN D/C Planner role. Discussed length of stay & expected recovery, Reviewed infusion of stem cells, pre-medications, telemetry & frequent vital signs during infusion  11/9/20[de-identified]  Discussed use of Granix on day+5 daily until [address] is >1.0. Reinforced timeline to recovery. 11/16/20:  Continue to reinforce timeline to recovery. 11/17/20:  Discussed discharge process & follow up @ [facility]. Given \"Purple ER\" card & explained purpose & use. Discussed revaccination @ 6 months, re-evaluation of myeloma @/or around day+100. States she has thermometer @ home. 11/18/20:  Reviewed expectations at time of discharge, follow up @ [facility] office post discharge. Day #100 re-evaluation of myeloma, re-vaccination at 6 months. Given oral thermometer & \"Purple ER\" card, explained purpose & use.     Discharge  When [address] is >1.0 & without toxicities        Pending

## 2020-11-18 NOTE — PROGRESS NOTES
Physician Progress Note      PATIENT:               Najma Castle  CSN #:                  [id number]  :                       1955  ADMIT DATE:       2020 9:15 AM  DISCH DATE:  RESPONDING  PROVIDER #:        Amanda Lara TEXT:    Patient admitted  for auto stem cell transplant done , noted to have   WBC: 0.9, HGB: 8.8, PLT: 12.  If possible, please document in progress notes   and discharge summary if you are evaluating and/or treating any of the   following: The medical record reflects the following:  Risk Factors: High Dose Melphalan preparative regimen, s/p auto   Clinical Indicators: Leukopenia & Anemia since , Pancytopenia since . Current documentation (): \"anemia & leukopenia from chemotherapy\"  Treatment: Platelet transfusion 11/15, Transfuse for Hgb < 7 and Platelets <   97Y, daily labs  Options provided:  -- Antineoplastic (chemotherapy) induced pancytopenia  -- Pancytopenia due to other, Please specify  -- Other - I will add my own diagnosis  -- Disagree - Not applicable / Not valid  -- Disagree - Clinically unable to determine / Unknown  -- Refer to Clinical Documentation Reviewer    PROVIDER RESPONSE TEXT:    Patient has antineoplastic (chemotherapy) induced pancytopenia. Query created by:  Segundo Dove on 2020 1:19 PM      Electronically signed by:  Catie Hendricks 2020 10:11 AM

## 2020-11-18 NOTE — PLAN OF CARE
Problem: Falls - Risk of:  Goal: Will remain free from falls  Description: Will remain free from falls  Outcome: Met This Shift  Note: Orthostatic vital signs obtained at start of shift - see flowsheet for details. Pt does not meet criteria for orthostasis. Pt is a Med fall risk. See Saldivar Mort Fall Score and ABCDS Injury Risk assessments. Problem: Bleeding:  Goal: Will show no signs and symptoms of excessive bleeding  Description: Will show no signs and symptoms of excessive bleeding  Outcome: Met This Shift  Note: No bleeding noted. No transfusions required per protocol. Problem: PROTECTIVE PRECAUTIONS  Goal: Patient will remain free of nosocomial Infections  Outcome: Met This Shift  Note: Pt remains afebrile this shift. VSS. Pt educated on infection prevention and continues to follow protective precautions appropriately. Will continue to monitor. Problem: Nutrition Deficit:  Goal: Ability to achieve adequate nutritional intake will improve  Description: Ability to achieve adequate nutritional intake will improve  Outcome: Met This Shift  Note: Denies c/o nausea or vomiting. Eating and drinking well. Problem: Activity:  Goal: Ability to tolerate increased activity will improve  Description: Ability to tolerate increased activity will improve  Outcome: Met This Shift  Note: Stable/No Isolation Precautions:  Pt with activity orders for up ad salome. Encouraged pt to be up OOB as much as possible throughout the day and for all meals. Encouraged frequent short naps as necessary to preserve energy but instructed that while awake, pt should be OOB. Encouraged pt to ambulate in halls. Pt seen up ad salome in halls several times this shift. Pt is visualized to be OOB % of the time this shift. Will continue to encourage frequent activity.        Problem: Infection - Central Venous Catheter-Associated Bloodstream Infection:  Goal: Will show no infection signs and symptoms  Description: Will show no

## 2020-11-18 NOTE — PLAN OF CARE
Problem: Falls - Risk of:  Goal: Will remain free from falls  Description: Will remain free from falls  Outcome: Ongoing  Note: Orthostatic vital signs obtained at start of shift - see flowsheet for details. Pt does not meet criteria for orthostasis. Pt is a Med fall risk. See Charmayne Sender Fall Score and ABCDS Injury Risk assessments. Pt bed is in low position, side rails up, call light and belongings are in reach. Fall risk light is on outside pts room. Pt encouraged to call for assistance as needed. Will continue with hourly rounds for PO intake, pain needs, toileting and repositioning as needed. Problem: Bleeding:  Goal: Will show no signs and symptoms of excessive bleeding  Description: Will show no signs and symptoms of excessive bleeding  Outcome: Ongoing  Note:   Patient's hemoglobin this AM:   Recent Labs     11/18/20  0308   HGB 8.6*     Patient's platelet count this AM:   Recent Labs     11/18/20  0308   PLT 16*    Thrombocytopenia Precautions in place. Patient showing no signs or symptoms of active bleeding. Transfusion not indicated at this time. Patient verbalizes understanding of all instructions. Will continue to assess and implement POC. Call light within reach and hourly rounding in place. Problem: PROTECTIVE PRECAUTIONS  Goal: Patient will remain free of nosocomial Infections  Outcome: Ongoing  Note: Pt remains in protective precautions. No living plants or fresh flowers in his/her room. Patient educated on wearing mask when in hallways. Patient, staff, and visitors adhering to handwashing guidelines. Patient cleansed with chlorhexidine wipes and linens changed daily per protocol. Pt verbalizes understanding of low microbial diet. Patient remains free of nosocomial infections.          Problem: Nutrition Deficit:  Goal: Ability to achieve adequate nutritional intake will improve  Description: Ability to achieve adequate nutritional intake will improve  Outcome: Ongoing  Note: Patient continues with scheduled compazine. Patient has no complaints of nausea or vomiting. Will continue to monitor. Problem: Infection - Central Venous Catheter-Associated Bloodstream Infection:  Goal: Will show no infection signs and symptoms  Description: Will show no infection signs and symptoms  Outcome: Ongoing  Note: CVC site remains free of signs/symptoms of infection. No drainage, edema, erythema, pain, or warmth noted at site. Dressing changes continue per protocol and on an as needed basis - see flowsheet. Compliant with [facility] Bath Protocol:  Performed CHG bath today per [facility] protocol utilizing CHG solution in the shower. CVC site cleansed with CHG wipe over dressing, skin surrounding dressing, and first 6\" of IV tubing. Pt tolerated well. Continued to encourage daily CHG bathing per [facility] protocol. Problem: Nausea/Vomiting:  Goal: Ability to achieve adequate nutritional intake will improve  Description: Ability to achieve adequate nutritional intake will improve  Outcome: Ongoing  Note: Patient continues with scheduled compazine. Patient has no complaints of nausea or vomiting. Will continue to monitor.

## 2020-11-19 VITALS
HEIGHT: 63 IN | HEART RATE: 95 BPM | TEMPERATURE: 97.8 F | WEIGHT: 142.6 LBS | BODY MASS INDEX: 25.27 KG/M2 | DIASTOLIC BLOOD PRESSURE: 67 MMHG | RESPIRATION RATE: 16 BRPM | OXYGEN SATURATION: 96 % | SYSTOLIC BLOOD PRESSURE: 120 MMHG

## 2020-11-19 LAB
ANION GAP SERPL CALCULATED.3IONS-SCNC: 7 MMOL/L (ref 3–16)
APTT: 28.1 SEC (ref 24.2–36.2)
BASOPHILS ABSOLUTE: 0 K/UL (ref 0–0.2)
BASOPHILS RELATIVE PERCENT: 0 %
BUN BLDV-MCNC: 3 MG/DL (ref 7–20)
CALCIUM SERPL-MCNC: 8.7 MG/DL (ref 8.3–10.6)
CHLORIDE BLD-SCNC: 106 MMOL/L (ref 99–110)
CO2: 24 MMOL/L (ref 21–32)
CREAT SERPL-MCNC: <0.5 MG/DL (ref 0.6–1.2)
EOSINOPHILS ABSOLUTE: 0 K/UL (ref 0–0.6)
EOSINOPHILS RELATIVE PERCENT: 0 %
GFR AFRICAN AMERICAN: >60
GFR NON-AFRICAN AMERICAN: >60
GLUCOSE BLD-MCNC: 97 MG/DL (ref 70–99)
HCT VFR BLD CALC: 25.9 % (ref 36–48)
HEMOGLOBIN: 8.8 G/DL (ref 12–16)
INR BLD: 0.97 (ref 0.86–1.14)
LYMPHOCYTES ABSOLUTE: 0.9 K/UL (ref 1–5.1)
LYMPHOCYTES RELATIVE PERCENT: 12 %
MAGNESIUM: 2.1 MG/DL (ref 1.8–2.4)
MCH RBC QN AUTO: 30.2 PG (ref 26–34)
MCHC RBC AUTO-ENTMCNC: 34.2 G/DL (ref 31–36)
MCV RBC AUTO: 88.2 FL (ref 80–100)
MONOCYTES ABSOLUTE: 0.4 K/UL (ref 0–1.3)
MONOCYTES RELATIVE PERCENT: 5 %
NEUTROPHILS ABSOLUTE: 6.6 K/UL (ref 1.7–7.7)
NEUTROPHILS RELATIVE PERCENT: 83 %
PDW BLD-RTO: 13.5 % (ref 12.4–15.4)
PLATELET # BLD: 33 K/UL (ref 135–450)
PMV BLD AUTO: 8.5 FL (ref 5–10.5)
POTASSIUM SERPL-SCNC: 3.8 MMOL/L (ref 3.5–5.1)
PROTHROMBIN TIME: 11.3 SEC (ref 10–13.2)
RBC # BLD: 2.93 M/UL (ref 4–5.2)
SODIUM BLD-SCNC: 137 MMOL/L (ref 136–145)
URIC ACID, SERUM: 2.8 MG/DL (ref 2.6–6)
WBC # BLD: 7.9 K/UL (ref 4–11)

## 2020-11-19 PROCEDURE — 6370000000 HC RX 637 (ALT 250 FOR IP): Performed by: NURSE PRACTITIONER

## 2020-11-19 PROCEDURE — 85730 THROMBOPLASTIN TIME PARTIAL: CPT

## 2020-11-19 PROCEDURE — 6360000002 HC RX W HCPCS: Performed by: NURSE PRACTITIONER

## 2020-11-19 PROCEDURE — 83735 ASSAY OF MAGNESIUM: CPT

## 2020-11-19 PROCEDURE — 85025 COMPLETE CBC W/AUTO DIFF WBC: CPT

## 2020-11-19 PROCEDURE — 84550 ASSAY OF BLOOD/URIC ACID: CPT

## 2020-11-19 PROCEDURE — 6370000000 HC RX 637 (ALT 250 FOR IP): Performed by: INTERNAL MEDICINE

## 2020-11-19 PROCEDURE — 2580000003 HC RX 258: Performed by: NURSE PRACTITIONER

## 2020-11-19 PROCEDURE — 36592 COLLECT BLOOD FROM PICC: CPT

## 2020-11-19 PROCEDURE — 80048 BASIC METABOLIC PNL TOTAL CA: CPT

## 2020-11-19 PROCEDURE — 85610 PROTHROMBIN TIME: CPT

## 2020-11-19 RX ORDER — LOPERAMIDE HYDROCHLORIDE 2 MG/1
2 CAPSULE ORAL PRN
COMMUNITY
Start: 2020-11-19 | End: 2020-11-29

## 2020-11-19 RX ORDER — PROCHLORPERAZINE MALEATE 10 MG
10 TABLET ORAL EVERY 6 HOURS PRN
Qty: 60 TABLET | Refills: 3 | Status: SHIPPED | OUTPATIENT
Start: 2020-11-19

## 2020-11-19 RX ORDER — ACYCLOVIR 800 MG/1
800 TABLET ORAL 2 TIMES DAILY
Qty: 60 TABLET | Refills: 11 | Status: SHIPPED | OUTPATIENT
Start: 2020-11-19

## 2020-11-19 RX ORDER — POTASSIUM CHLORIDE 20 MEQ/1
20 TABLET, EXTENDED RELEASE ORAL 2 TIMES DAILY WITH MEALS
Qty: 60 TABLET | Refills: 3 | Status: SHIPPED | OUTPATIENT
Start: 2020-11-19

## 2020-11-19 RX ORDER — HEPARIN SODIUM (PORCINE) LOCK FLUSH IV SOLN 100 UNIT/ML 100 UNIT/ML
1500 SOLUTION INTRAVENOUS ONCE
Status: COMPLETED | OUTPATIENT
Start: 2020-11-19 | End: 2020-11-19

## 2020-11-19 RX ADMIN — POTASSIUM CHLORIDE 20 MEQ: 1500 TABLET, EXTENDED RELEASE ORAL at 08:07

## 2020-11-19 RX ADMIN — VALACYCLOVIR HYDROCHLORIDE 500 MG: 500 TABLET, FILM COATED ORAL at 08:06

## 2020-11-19 RX ADMIN — Medication 10 ML: at 08:07

## 2020-11-19 RX ADMIN — ACETAMINOPHEN 650 MG: 325 TABLET ORAL at 04:39

## 2020-11-19 RX ADMIN — Medication 1500 UNITS: at 10:59

## 2020-11-19 ASSESSMENT — PAIN SCALES - GENERAL
PAINLEVEL_OUTOF10: 0
PAINLEVEL_OUTOF10: 0
PAINLEVEL_OUTOF10: 5

## 2020-11-19 NOTE — PROGRESS NOTES
Reviewed discharge instructions with patient. Reviewed discharge medications including dosing, schedule, indication, and adverse reactions. Reviewed which medications were already taken today and next dosage due for each medication. Reviewed signs and symptoms that prompt a call to the physician and appropriate phone numbers. Purple ER card given to the patient with explanations of its use. Reviewed follow up appointments that have been made in [facility] and Outpatient Oncology. Low microbial diet, activity restrictions, and increased risk of infection were reviewed. Patient is being discharged with IV access d/t need for ongoing therapy:      Type:  Trifusion                        Next dressing change due on: 11/26     Patient verbalized understanding of all instructions and questions were answered to her. satisfaction. Signed discharge instructions were given to the patient and a copy placed in the paper-lite chart. Patient discharged to home per self with family member.       Trinity Blas

## 2020-11-19 NOTE — PLAN OF CARE
Problem: Falls - Risk of:  Goal: Will remain free from falls  Description: Will remain free from falls  11/19/2020 1029 by Awais Muniz RN  Outcome: Ongoing    Orthostatic vital signs obtained at start of shift - see flowsheet for details. Pt does not meet criteria for orthostasis. Pt is a Med fall risk. See Terald Median Fall Score and ABCDS Injury Risk assessments. - Screening for Orthostasis AND not a [place] Risk per COPPOLA/ABCDS: Pt bed is in low position, side rails up, call light and belongings are in reach. Fall risk light is on outside pts room. Pt encouraged to call for assistance as needed. Will continue with hourly rounds for PO intake, pain needs, toileting and repositioning as needed. Problem: Bleeding:  Goal: Will show no signs and symptoms of excessive bleeding  Description: Will show no signs and symptoms of excessive bleeding  11/19/2020 1029 by Awais Muniz RN  Outcome: Ongoing    Patient's hemoglobin this AM:   Recent Labs     11/19/20  0303   HGB 8.8*     Patient's platelet count this AM:   Recent Labs     11/19/20  0303   PLT 33*    Thrombocytopenia Precautions in place. Patient showing no signs or symptoms of active bleeding. Transfusion not indicated at this time. Patient verbalizes understanding of all instructions. Will continue to assess and implement POC. Call light within reach and hourly rounding in place. Problem: PROTECTIVE PRECAUTIONS  Goal: Patient will remain free of nosocomial Infections  11/19/2020 1029 by Awais Muniz RN  Outcome: Ongoing    Pt educated on hand hygiene. No fresh flowers or plants in patient room. No s/s of infection. Problem: Discharge Planning:  Goal: Discharged to appropriate level of care  Description: Discharged to appropriate level of care  11/19/2020 1029 by Awais Muniz RN  Outcome: Ongoing     Pt being discharged today.      Problem: Infection - Central Venous Catheter-Associated Bloodstream Infection:  Goal: Will show no infection signs and symptoms  Description: Will show no infection signs and symptoms  Outcome: Ongoing    CVC site remains free of signs/symptoms of infection. No drainage, edema, erythema, pain, or warmth noted at site. Dressing changes continue per protocol and on an as needed basis - see flowsheet.

## 2020-11-19 NOTE — CARE COORDINATION
Case Management Assessment            Discharge Note                    Date / Time of Note: 11/19/2020 10:23 AM                  Discharge Note Completed by: Caryn Conley    Patient Name: Erick Wellington   YOB: 1955  Diagnosis: Multiple myeloma [facility]) [C90.00]  Multiple myeloma in remission [facility]) [C90.01]   Date / Time: 11/4/2020  9:15 AM    Current PCP: Brian Cazares MD  Clinic patient: No    Hospitalization in the last 30 days: Yes    Advance Directives:  Code Status: Full Code  [place] DNR form completed and on chart: No    Financial:  Payor: MEDICARE / Plan: MEDICARE PART A AND B / Product Type: *No Product type* /      Pharmacy:    [facility] #[id number] - [address] [facility] [address]  Phone: [phone] Fax: [phone] [address] [phone] -  [phone]  [address] [facility] [address]  Phone: [phone] Fax: [phone]      Assistance purchasing medications?: Potential Assistance Purchasing Medications: No  Assistance provided by Case Management: None at this time    Does patient want to participate in local refill/ meds to beds program?: Not Assessed    Meds To Beds General Rules:  1. Can ONLY be done Monday- Friday between 8:30am-5pm  2. Prescription(s) must be in pharmacy by 3pm to be filled same day  3. Copy of patient's insurance/ prescription drug card and patient face sheet must be sent along with the prescription(s)  4. Cost of Rx cannot be added to hospital bill. If financial assistance is needed, please contact unit  or ;  or  CANNOT provide pharmacy voucher for patients co-pays  5.  Patients can then  the prescription on their way out of the hospital at discharge, or pharmacy can deliver to the bedside if staff is available. (payment due at time of pick-up or delivery - cash, check, or card accepted)     Able to afford home medications/ co-pay costs: Yes    ADLS:  Current PT AM-PAC Score:   /24  Current OT AM-PAC Score:   /24      DISCHARGE Disposition: Home- No Services Needed    LOC at discharge: Not Applicable  CHARLINE Completed: No    Notification completed in HENS/PAS?:  Not Applicable    IMM Completed:   Yes, Case management has presented and reviewed IMM letter #2 to the patient and/or family/ POA. Patient and/or family/POA verbalized understanding of their medicare rights and appeal process if needed. Patient and/or family/POA has signed, initialed and placed today's date (11/19/2020) and time [phone]) on IMM letter #2 on the the appropriate lines. Patient and/or family/POA, copy of letter offered and they are aware that this original copy of IMM letter #2 is available prior to discharge from the paper chart on the unit. Electronic documentation has been entered into epic for IMM letter #2 and original paper copy has been added to the paper chart at the nurses station.      Transportation:  Transportation PLAN for discharge: family   Mode of Transport: Private Car  Reason for medical transport: Not Applicable  Name of [address] [place] [address], [address]: Not Applicable  Time of Transport: afternoon    Transport form completed: No    Home Care:  [address] ordered at discharge: No  [address]: Not Applicable  Orders faxed: No    Durable Medical Equipment:  DME Provider: afternoon  Equipment obtained during hospitalization: none    Home Oxygen and Respiratory Equipment:  Oxygen needed at discharge?: No  [address]: Not Applicable  Portable tank available for discharge?: No    Dialysis:  Dialysis patient: No    Dialysis Center:  Not Applicable    Hospice Services:  Location: Not Applicable  Agency: Not Applicable    Consents signed: No    Referrals made at [facility] for outpatient continued care:  Not Applicable    Additional CM Notes: Patient to discharge home with no needs today. The Plan for Transition of Care is related to the following treatment goals of Multiple myeloma ([facility] [address] [C90.00]  Multiple myeloma in remission ([facility] [address] [C90.01]    The Patient and/or patient representative Martinez Mcneil and her family were provided with a choice of provider and agrees with the discharge plan Yes    Freedom of choice list was provided with basic dialogue that supports the patient's individualized plan of care/goals and shares the quality data associated with the providers.  Not Indicated    Care Transitions patient: Radha Reis  Case Management Department  Ph: [phone]  Fax: [phone]

## 2020-11-19 NOTE — DISCHARGE SUMMARY
[address] [place] [address] Discharge Summary             Attending Physician: Kalina Lyons., DO    Referring MD: Eneida French MD  [address]    Name: Earl Judd :  1955  MRN:  [id number]    Admission: 2020   Discharge:   20    Date: 2020    Diagnosis on admit: Smoldering IgG Lambda Multiple Myeloma    Procedures: Routine chest x-ray, laboratories, EKG, IV fluid hydration, Chemotherapy, Stem cell infusion, Growth factor, Blood Product Infusions      Medications:    Jorge Garciaby   Home Medication Instructions T:[id number]    Printed on:20 1005   Medication Information                      acyclovir (ZOVIRAX) 800 MG tablet  Take 1 tablet by mouth 2 times daily             atorvastatin (LIPITOR) 10 MG tablet  Take 10 mg by mouth daily             Calcium Carbonate-Vitamin D (CALCIUM-VITAMIN D) 500-200 MG-UNIT per tablet  Take 1 tablet by mouth             doxepin (SINEQUAN) 10 MG capsule  Take 10 mg by mouth nightly             loperamide (IMODIUM) 2 MG capsule  Take 1 capsule by mouth as needed for Diarrhea             Multiple Vitamin (MULTIVITAMIN) tablet  Take 1 tablet by mouth             potassium chloride (KLOR-CON M) 20 MEQ extended release tablet  Take 1 tablet by mouth 2 times daily (with meals)             prochlorperazine (COMPAZINE) 10 MG tablet  Take 1 tablet by mouth every 6 hours as needed (Nausea)                 Reason for Admission: High Dose Melphalan preparative regimen followed by Autologous Stem Cell Transplant for     Hospital Course:     Sarah Frances is a 77 yo woman with diagnosis of IgG Lambda Multiple Myeloma Her PH is also significant for JULIETH, OA, HTN, HLD, insomnia, osteopenia, & Vit D deficiency. She was first diagnosed with myeloma 2020 after presenting for regular checkup and her bloodwork was found to be abnormal for elevated total protein. Additional workout for myeloma was then pursued and is as below.  With these findings she was diagnosed with IgG Lambda Multiple Myeloma & started on systemic tx with RVD. She completed 4 cycles of tx (6-9/2020) and achieved a MD.      She was admitted (11/4/20) for high dose Melphalan and autologous stem cell rescues. She received an infusion of 3.85  x10^6 [id number]/kg autologous PBSCs on 11/6/20. Her hospitalization was complicated by chemotherapy induced nausea/vomiting, diarrhea and intermittent orthostatic hypotension. Overall she did remarkably well during her hospitalization. Her nausea was managed w/ scheduled Compazine and Zyprexa. She maintained adequate oral intake. She did have chemotherapy induced diarrhea that was managed w/ Imodium. She also had intermittent orthostatic hypotension and required additional IVF boluses on at least 2 occasions. He BP medications remain on hold. Her WBC has recovered on Granix and she will be discharged home today. She will return 11/21/20 for labs and provider visit. Physical Exam:     Vital Signs:  /67   Pulse 95   Temp 97.8 °F (36.6 °C) (Oral)   Resp 16   Ht 5' 3\" (1.6 m)   Wt 142 lb 9.6 oz (64.7 kg)   SpO2 96%   BMI 25.26 kg/m²     Weight:    Wt Readings from Last 3 Encounters:   11/19/20 142 lb 9.6 oz (64.7 kg)   10/27/20 144 lb (65.3 kg)   10/16/20 149 lb 7.6 oz (67.8 kg)       KPS: 80% Normal activity with effort; some signs or symptoms of disease    PE performed by Dr. Jenn Shipley: Awake, alert and oriented.   HEENT: normocephalic, alopecia, PERRL, no scleral erythema or icterus, Oral mucosa moist w/out ulceration and throat clear  NECK: supple   BACK: Straight   SKIN: warm dry and intact without lesions rashes or masses  CHEST: CTA bilaterally without use of accessory muscles  CV: Normal S1 S2, RRR, no MRG  ABD: NT ND normoactive BS, no palpable masses or hepatosplenomegaly  EXTREMITIES: trace BLE edema, denies calf tenderness  NEURO: CN II - XII grossly intact  CATHETER: Right IJ Trifusion (IR, 10/27/20) - CDI    Discharge Laboratory Data:  CBC:   Recent Labs     11/17/20  0303 11/18/20  0308 11/19/20  0303   WBC 0.9* 3.0* 7.9   HGB 8.8* 8.6* 8.8*   HCT 25.2* 24.3* 25.9*   MCV 88.4 88.2 88.2   PLT 12* 16* 33*     BMP/Mag:  Recent Labs     11/17/20  0303 11/18/20 0308 11/19/20 0303   * 139 137   K 3.5 3.4* 3.8    107 106   CO2 23 23 24   PHOS  --  2.6  --    BUN 3* 3* 3*   CREATININE <0.5* <0.5* <0.5*   MG  --   --  2.10     LIVP:   Recent Labs     11/18/20 0308   AST 10*   ALT 16   BILIDIR <0.2   BILITOT 0.3   ALKPHOS 61     Coags:   Recent Labs     11/19/20 0303   PROTIME 11.3   INR 0.97   APTT 28.1     Uric Acid   Recent Labs     11/17/20  0303 11/18/20  0308 11/19/20  0303   LABURIC 2.2* 2.3* 2.8       PROBLEM LIST:            1. IgG Lambda smoldering multiple myeloma  2. JULIETH  3. OA  4. HTN  5. HLD  6. Insomnia  7. Osteopenia  8. Vit D deficiency     Post Transplant Complications:  1. Nausea / Vomiting  2. Diarrhea   3. Orthostatic Hypotension       TREATMENT:            1. RVD x 4 cycles (6/2020 - 9/2020)  2. HD Melphalan 200mg/m2 & ASCT (11/6/20) - 2.94 x10^6 [id number]/kg     ASSESSMENT AND PLAN:            1. IgG Lambda Smoldering Myeloma: ND  - S/p [id number] & ASCT (11/6/20)   - Post-Txp Maintenance: USOR trial - estephania/kika vs kika maintenance  - Post - Txp disease eval:  BMBx w/ FISH & myeloma labs      Day + 13     2. ID:  Afebrile, no evidence of infection.    - Cont acyclovir at discharge for VZV positive titer.         3. Heme: Pancytopenia from chemotherapy   - Transfuse for Hgb < 7 and Platelets < 50M  - No transfusion today  - S/p Granix (last dose 11/18/20)        4. Metabolic: Electrolytes and renal fxn stable  - Cont KCl 20 meq bid (started 11/18/20)     5. Cardiac:   - H/o HTN & HLD  HTN: intermittent orthostasis   - S/p IVF bolus for symptomatic orthostatic hypotension (11/8/20 & 11/14/20). - S/p amlodipine 5 mg & lisinopril-HCTZ 10-12.5mg (stopped 11/9/20) w/ orthostasis. Resume if BP increases   HLD:    - Resume statin      6. GI / Nutrition:    Nutrition:  Appetite and oral intake is adequate  - Cont MVI  - Cont low microbial diet   Nausea / Vomiting:  Delayed CINV has improved since (11/8/20)  - S/p Zyprexa & scheduled Compazine (stopped 11/18/20)  - Cont Compazine as needed   Diarrhea:  Intermittent  - C. Diff (11/8/20) - negative  - Cont Imodium as needed   Mucositis:  Resolved     7. Psych: Insomnia:  No recent complaints   - Cont doxepin 10 mg nightly  - Cont melatonin PRN       Condition on discharge:  stable    Discharge Instructions:  Return to [facility] on 11/21/20 for labs (CBC w/ diff, CMP, Mag & Phos) and provider visit. The patient was advised on activity and dietary restrictions. The patient was advised to follow up in the emergency department or contact the physician with any unresolved nausea/vomiting/diarrhea/pain or temperature greater than 100.5 F or any other unusual symptoms. This discharge summary and plan was discussed and agreed upon with Dr. Roderick Petit.     Sita Alves, CNP

## 2020-11-25 ENCOUNTER — HOSPITAL ENCOUNTER (OUTPATIENT)
Dept: INTERVENTIONAL RADIOLOGY/VASCULAR | Age: 65
Discharge: HOME | End: 2020-11-25

## 2020-11-27 NOTE — PRE-PROCEDURE INSTRUCTIONS
Called patient about procedure. Told to be here at 0630 for procedure at 0800. Must be NPO after midnight but can take morning medication with sips of water. Patient stated she is not on blood thinners. Told to have a responsible adult with them to take them home and stay with them afterwards, if they do not get admitted to hospital. Also, to bring a current list of medications. No other questions or concerns.

## 2020-11-30 ENCOUNTER — HOSPITAL ENCOUNTER (OUTPATIENT)
Dept: INTERVENTIONAL RADIOLOGY/VASCULAR | Age: 65
Discharge: HOME | End: 2020-11-30
Payer: MEDICARE

## 2020-11-30 VITALS — HEIGHT: 63 IN | BODY MASS INDEX: 24.45 KG/M2 | TEMPERATURE: 98.4 F | WEIGHT: 138 LBS

## 2020-11-30 LAB
HCT VFR BLD CALC: 31.8 % (ref 36–48)
HEMOGLOBIN: 10.8 G/DL (ref 12–16)
INR BLD: 0.9 (ref 0.86–1.14)
MCH RBC QN AUTO: 31 PG (ref 26–34)
MCHC RBC AUTO-ENTMCNC: 34.1 G/DL (ref 31–36)
MCV RBC AUTO: 90.8 FL (ref 80–100)
PDW BLD-RTO: 15.1 % (ref 12.4–15.4)
PLATELET # BLD: 159 K/UL (ref 135–450)
PMV BLD AUTO: 6.8 FL (ref 5–10.5)
PROTHROMBIN TIME: 10.4 SEC (ref 10–13.2)
RBC # BLD: 3.5 M/UL (ref 4–5.2)
WBC # BLD: 3.5 K/UL (ref 4–11)

## 2020-11-30 PROCEDURE — 85027 COMPLETE CBC AUTOMATED: CPT

## 2020-11-30 PROCEDURE — 36589 REMOVAL TUNNELED CV CATH: CPT | Performed by: RADIOLOGY

## 2020-11-30 PROCEDURE — 85610 PROTHROMBIN TIME: CPT

## 2020-11-30 PROCEDURE — 2500000003 HC RX 250 WO HCPCS

## 2020-11-30 NOTE — H&P
Patient:  Willow Patel   :   1955      Relevant patient history reviewed and discussed. The procedure including risks and benefits was discussed at length with the patient (or designated family member) and all questions were answered. Informed consent to proceed with the procedure was given. Condition : stable    [facility] nurses notes reviewed and agreed. Medications reviewed. Allergies:    Allergies   Allergen Reactions    Trazodone Itching

## 2020-11-30 NOTE — PROCEDURES
IR Brief Postoperative Note    Benitez Wheeler  YOB: 1955  [id number]    Pre-operative Diagnosis: myeloma    Post-operative Diagnosis: Same    Procedure: trifusion cath removal    Anesthesia: local    Surgeons/Assistants: maylin    Estimated Blood Loss: minimal    Complications: none    Specimens: were not obtained    See full procedure dictation to follow      Dar Molina MD  11/30/2020

## 2021-03-01 ENCOUNTER — IMMUNIZATION (OUTPATIENT)
Dept: PRIMARY CARE CLINIC | Age: 66
End: 2021-03-01
Payer: MEDICARE

## 2021-03-01 PROCEDURE — 91300 COVID-19, PFIZER VACCINE 30MCG/0.3ML DOSE: CPT | Performed by: FAMILY MEDICINE

## 2021-03-01 PROCEDURE — 0001A COVID-19, PFIZER VACCINE 30MCG/0.3ML DOSE: CPT | Performed by: FAMILY MEDICINE

## 2021-03-22 ENCOUNTER — IMMUNIZATION (OUTPATIENT)
Dept: PRIMARY CARE CLINIC | Age: 66
End: 2021-03-22
Payer: MEDICARE

## 2021-03-22 PROCEDURE — 91300 COVID-19, PFIZER VACCINE 30MCG/0.3ML DOSE: CPT | Performed by: FAMILY MEDICINE

## 2021-03-22 PROCEDURE — 0002A COVID-19, PFIZER VACCINE 30MCG/0.3ML DOSE: CPT | Performed by: FAMILY MEDICINE

## 2022-06-04 ENCOUNTER — TELEPHONE ENCOUNTER (OUTPATIENT)
Dept: URBAN - METROPOLITAN AREA CLINIC 68 | Facility: CLINIC | Age: 67
End: 2022-06-04

## 2022-06-05 ENCOUNTER — TELEPHONE ENCOUNTER (OUTPATIENT)
Dept: URBAN - METROPOLITAN AREA CLINIC 68 | Facility: CLINIC | Age: 67
End: 2022-06-05

## 2022-06-25 ENCOUNTER — TELEPHONE ENCOUNTER (OUTPATIENT)
Age: 67
End: 2022-06-25

## 2022-06-26 ENCOUNTER — TELEPHONE ENCOUNTER (OUTPATIENT)
Age: 67
End: 2022-06-26

## 2022-06-26 RX ORDER — LISINOPRIL 10 MG/1
LISINOPRIL(    DAILY ) ACTIVE -HX ENTRY TABLET ORAL DAILY
Status: ACTIVE | COMMUNITY
Start: 2018-04-04

## 2022-06-26 RX ORDER — AMLODIPINE BESYLATE 10 MG/1
AMLODIPINE(    DAILY ) ACTIVE -HX ENTRY TABLET ORAL DAILY
Status: ACTIVE | COMMUNITY
Start: 2018-04-04

## 2022-07-01 ENCOUNTER — HOSPITAL ENCOUNTER (OUTPATIENT)
Age: 67
Discharge: HOME | End: 2022-07-01
Payer: MEDICARE

## 2022-07-01 ENCOUNTER — HOSPITAL ENCOUNTER (OUTPATIENT)
Dept: GENERAL RADIOLOGY | Age: 67
Discharge: HOME | End: 2022-07-01
Payer: MEDICARE

## 2022-07-01 DIAGNOSIS — C90.00 MYELOMA ASSOCIATED AMYLOIDOSIS (HCC): ICD-10-CM

## 2022-07-01 DIAGNOSIS — E85.9 MYELOMA ASSOCIATED AMYLOIDOSIS (HCC): ICD-10-CM

## 2022-07-01 PROCEDURE — 72100 X-RAY EXAM L-S SPINE 2/3 VWS: CPT

## 2022-07-01 PROCEDURE — 72220 X-RAY EXAM SACRUM TAILBONE: CPT

## 2022-08-19 ENCOUNTER — HOSPITAL ENCOUNTER (OUTPATIENT)
Dept: GENERAL RADIOLOGY | Age: 67
Discharge: HOME | End: 2022-08-19
Payer: MEDICARE

## 2022-08-19 DIAGNOSIS — N95.1 SYMPTOMATIC MENOPAUSAL OR FEMALE CLIMACTERIC STATES: ICD-10-CM

## 2022-08-19 PROCEDURE — 77080 DXA BONE DENSITY AXIAL: CPT

## 2023-04-20 ENCOUNTER — HOSPITAL ENCOUNTER (OUTPATIENT)
Dept: GENERAL RADIOLOGY | Age: 68
Discharge: HOME | End: 2023-04-20
Payer: MEDICARE

## 2023-04-20 DIAGNOSIS — E85.9 MYELOMA ASSOCIATED AMYLOIDOSIS (HCC): ICD-10-CM

## 2023-04-20 DIAGNOSIS — C90.00 MYELOMA ASSOCIATED AMYLOIDOSIS (HCC): ICD-10-CM

## 2023-04-20 PROCEDURE — 77075 RADEX OSSEOUS SURVEY COMPL: CPT

## 2023-05-03 ENCOUNTER — HOSPITAL ENCOUNTER (OUTPATIENT)
Dept: ONCOLOGY | Age: 68
Setting detail: INFUSION SERIES
Discharge: HOME | End: 2023-05-03
Payer: MEDICARE

## 2023-05-03 VITALS
HEIGHT: 63 IN | OXYGEN SATURATION: 99 % | BODY MASS INDEX: 21.29 KG/M2 | DIASTOLIC BLOOD PRESSURE: 79 MMHG | TEMPERATURE: 97.5 F | RESPIRATION RATE: 19 BRPM | HEART RATE: 53 BPM | WEIGHT: 120.15 LBS | SYSTOLIC BLOOD PRESSURE: 129 MMHG

## 2023-05-03 LAB
BASOPHILS # BLD: 0 K/UL (ref 0–0.2)
BASOPHILS NFR BLD: 1.3 %
DEPRECATED RDW RBC AUTO: 13.7 % (ref 12.4–15.4)
EOSINOPHIL # BLD: 0.1 K/UL (ref 0–0.6)
EOSINOPHIL NFR BLD: 3.3 %
HCT VFR BLD AUTO: 35.5 % (ref 36–48)
HGB BLD-MCNC: 11.9 G/DL (ref 12–16)
LYMPHOCYTES # BLD: 0.7 K/UL (ref 1–5.1)
LYMPHOCYTES NFR BLD: 27.9 %
MCH RBC QN AUTO: 33 PG (ref 26–34)
MCHC RBC AUTO-ENTMCNC: 33.6 G/DL (ref 31–36)
MCV RBC AUTO: 98.2 FL (ref 80–100)
MONOCYTES # BLD: 0.6 K/UL (ref 0–1.3)
MONOCYTES NFR BLD: 21.6 %
NEUTROPHILS # BLD: 1.2 K/UL (ref 1.7–7.7)
NEUTROPHILS NFR BLD: 45.9 %
PLATELET # BLD AUTO: 161 K/UL (ref 135–450)
PMV BLD AUTO: 6.9 FL (ref 5–10.5)
RBC # BLD AUTO: 3.61 M/UL (ref 4–5.2)
WBC # BLD AUTO: 2.6 K/UL (ref 4–11)

## 2023-05-03 PROCEDURE — 88311 DECALCIFY TISSUE: CPT

## 2023-05-03 PROCEDURE — 2500000003 HC RX 250 WO HCPCS: Performed by: NURSE PRACTITIONER

## 2023-05-03 PROCEDURE — 6360000002 HC RX W HCPCS: Performed by: NURSE PRACTITIONER

## 2023-05-03 PROCEDURE — 88341 IMHCHEM/IMCYTCHM EA ADD ANTB: CPT

## 2023-05-03 PROCEDURE — 85025 COMPLETE CBC W/AUTO DIFF WBC: CPT

## 2023-05-03 PROCEDURE — 88313 SPECIAL STAINS GROUP 2: CPT

## 2023-05-03 PROCEDURE — 94761 N-INVAS EAR/PLS OXIMETRY MLT: CPT

## 2023-05-03 PROCEDURE — 96375 TX/PRO/DX INJ NEW DRUG ADDON: CPT

## 2023-05-03 PROCEDURE — 38221 DX BONE MARROW BIOPSIES: CPT

## 2023-05-03 PROCEDURE — 88305 TISSUE EXAM BY PATHOLOGIST: CPT

## 2023-05-03 PROCEDURE — 99152 MOD SED SAME PHYS/QHP 5/>YRS: CPT

## 2023-05-03 PROCEDURE — 96374 THER/PROPH/DIAG INJ IV PUSH: CPT

## 2023-05-03 PROCEDURE — 2700000000 HC OXYGEN THERAPY PER DAY

## 2023-05-03 PROCEDURE — 88342 IMHCHEM/IMCYTCHM 1ST ANTB: CPT

## 2023-05-03 RX ORDER — FLUMAZENIL 0.1 MG/ML
0.5 INJECTION INTRAVENOUS PRN
Status: DISCONTINUED | OUTPATIENT
Start: 2023-05-03 | End: 2023-05-04 | Stop reason: HOSPADM

## 2023-05-03 RX ORDER — NALOXONE HYDROCHLORIDE 0.4 MG/ML
0.4 INJECTION, SOLUTION INTRAMUSCULAR; INTRAVENOUS; SUBCUTANEOUS PRN
Status: DISCONTINUED | OUTPATIENT
Start: 2023-05-03 | End: 2023-05-04 | Stop reason: HOSPADM

## 2023-05-03 RX ORDER — MIDAZOLAM HYDROCHLORIDE 1 MG/ML
5 INJECTION INTRAMUSCULAR; INTRAVENOUS
Status: COMPLETED | OUTPATIENT
Start: 2023-05-03 | End: 2023-05-03

## 2023-05-03 RX ORDER — FENTANYL CITRATE 50 UG/ML
100 INJECTION, SOLUTION INTRAMUSCULAR; INTRAVENOUS
Status: COMPLETED | OUTPATIENT
Start: 2023-05-03 | End: 2023-05-03

## 2023-05-03 RX ORDER — LIDOCAINE HYDROCHLORIDE 10 MG/ML
5 INJECTION, SOLUTION EPIDURAL; INFILTRATION; INTRACAUDAL; PERINEURAL
Status: DISCONTINUED | OUTPATIENT
Start: 2023-05-03 | End: 2023-05-04 | Stop reason: HOSPADM

## 2023-05-03 RX ADMIN — FENTANYL CITRATE 50 MCG: 50 INJECTION, SOLUTION INTRAMUSCULAR; INTRAVENOUS at 09:13

## 2023-05-03 RX ADMIN — MIDAZOLAM 2 MG: 1 INJECTION INTRAMUSCULAR; INTRAVENOUS at 09:13

## 2023-05-03 RX ADMIN — FLUMAZENIL 0.5 MG: 0.1 INJECTION, SOLUTION INTRAVENOUS at 09:30

## 2023-05-03 RX ADMIN — NALOXONE HYDROCHLORIDE 0.4 MG: 0.4 INJECTION, SOLUTION INTRAMUSCULAR; INTRAVENOUS; SUBCUTANEOUS at 09:30

## 2023-05-03 NOTE — PROGRESS NOTES
ETCO2  Monitoring done for conscious sedation. Patient is on 1 liters/min via nasal cannula for procedure.     Baseline information:  HR: 58 BP: 133/73  RR: 14 LOC: alert  ETCO2: 39 SpO2: 100    5 minutes after sedation administered:  HR: 58 BP: 142/76  RR: 12 LOC: lethargic  ETCO2: 40 SpO2: 100    10 min after sedation administered:  HR: 58 BP: 131/72  RR: 15 LOC: alert  ETCO2: 42 SpO2: 100    Post-Procedure:  HR: 58 BP: 125/73  RR: 15 LOC: alert  ETCO2: 41 SpO2: 100  well    Patient/caregiver was educated on the proper method of use:  Yes      Level of patient/caregiver understanding able to:   [x] Verbalize understanding   [] Demonstrate understanding       [] Teach back        [] Needs reinforcement       []  No available caregiver               []  Other:     Response to education:  Excellent

## 2023-05-03 NOTE — PROGRESS NOTES
Pt scheduled for bone marrow biopsy for diagnosis of Multiple Myeloma. Pt's history, allergies, and medication list reviewed by nursing and provider prior to start of procedure. Pt assessed and deemed fit for procedure by provider. Explanation of procedure provided to patient & family. Signed, Dated, and timed Informed Consent obtained & on the chart. Current Medications, Allergies, and recent H&P reviewed. Emergency medications and equipment available. Time out performed prior to procedure - see flowsheets. Nursing present throughout procedure to assess, assist, and monitor. No complications encountered during procedure. Pt tolerated well. Right dressed with sterile dressing. Pt instructed to lie flat with pressure applied to site for 30-60 minutes. Also instructed to leave dressing in place x24 hours and report any \"wetness\" or bleeding to nurse immediately. Pt may shower after 24 hours. Pt verbalizes understanding of all instructions. Will monitor site closely for any signs of bleeding.

## 2023-05-03 NOTE — DISCHARGE INSTRUCTIONS
Latest Reference Range & Units 05/03/23 08:48   WBC 4.0 - 11.0 K/uL 2.6 (L)   RBC 4.00 - 5.20 M/uL 3.61 (L)   Hemoglobin Quant 12.0 - 16.0 g/dL 11.9 (L)   Hematocrit 36.0 - 48.0 % 35.5 (L)   MCV 80.0 - 100.0 fL 98.2   MCH 26.0 - 34.0 pg 33.0   MCHC 31.0 - 36.0 g/dL 33.6   MPV 5.0 - 10.5 fL 6.9   RDW 12.4 - 15.4 % 13.7   Platelet Count 786 - 450 K/uL 161   Neutrophils % % 45.9   Lymphocyte % % 27.9   Monocytes % % 21.6   Eosinophils % % 3.3   Basophils % % 1.3   Neutrophils Absolute 1.7 - 7.7 K/uL 1.2 (L)   Lymphocytes Absolute 1.0 - 5.1 K/uL 0.7 (L)   Monocytes Absolute 0.0 - 1.3 K/uL 0.6   Eosinophils Absolute 0.0 - 0.6 K/uL 0.1   Basophils Absolute 0.0 - 0.2 K/uL 0.0   (L): Data is abnormally low

## 2023-05-03 NOTE — PROGRESS NOTES
Pt scheduled for bone marrow biopsy with conscious sedation for diagnosis of Multiple Myeloma. Pt's history, allergies, and medication list reviewed by nursing and physician prior to start of procedure. Pt assessed and deemed fit for procedure by physician. Height: 5'2\"  Weight: 120 lbs    Pre-Procedural Assessment:  Signed, Dated, and timed Informed Consent on the chart  VS's obtained and documented  Pt has a patent IV site (see flowsheets)  Pt has been NPO since 1800 on 5/2/2023  Current Medications, Allergies, and recent H&P reviewed and on chart  Emergency medications and equipment available (crash cart, narcan, flumazenil, O2, suction, etc)  Time out performed prior to procedure (see sedation documentation)  Pre-Procedure Juice Score: 10    Procedure  Start Time: 0917  Stop Time: [address] present throughout procedure to assess, assist, and monitor. Vital Signs monitored throughout - see sedation documentation. Patient to be discharged from OPO once Juice Score of 8 or better is achieved or once pre-procedural Juice Score is obtained. Medications Given  Versed: 2 mg  Fentanyl: 50 mcg    Post-Procedure:  Pt vital signs stable. Discharge teaching provided (see AVS). Post procedure Juice Score 10. Pt and family verbalized understanding of all instructions. Pt discharged from [address] with her Brother who will drive them home.

## 2023-05-10 LAB
Lab: NORMAL
REPORT: NORMAL
THIS TEST SENT TO: NORMAL

## 2023-05-17 LAB
Lab: NORMAL
REPORT: NORMAL
THIS TEST SENT TO: NORMAL

## 2023-06-04 ENCOUNTER — HOSPITAL ENCOUNTER (OUTPATIENT)
Dept: CT IMAGING | Age: 68
Discharge: HOME | End: 2023-06-04
Attending: STUDENT IN AN ORGANIZED HEALTH CARE EDUCATION/TRAINING PROGRAM

## 2023-06-04 DIAGNOSIS — C90.00 MULTIPLE MYELOMA, REMISSION STATUS UNSPECIFIED (HCC): ICD-10-CM

## 2024-03-25 NOTE — PROGRESS NOTES
Pt alert and oriented. AM meds complete. Pt tolerated well. VSS and WDL. No s/s of distress. No further needs noted at this time. Will continue to monitor and assess. Statement Selected

## 2024-07-29 ENCOUNTER — HOSPITAL ENCOUNTER (OUTPATIENT)
Dept: GENERAL RADIOLOGY | Age: 69
Discharge: HOME | End: 2024-07-29
Payer: MEDICARE

## 2024-07-29 DIAGNOSIS — C90.00 MULTIPLE MYELOMA, REMISSION STATUS UNSPECIFIED (HCC): ICD-10-CM

## 2024-07-29 PROCEDURE — 77075 RADEX OSSEOUS SURVEY COMPL: CPT

## (undated) DEVICE — TUBING INSUFFLATION 10FT HIGH FLOW LEUR

## (undated) DEVICE — MERCY FAIRFIELD TURNOVER KIT: Brand: MEDLINE INDUSTRIES, INC.

## (undated) DEVICE — ANCHOR TISSUE RETRIEVAL SYSTEM, BAG SIZE 125 ML, PORT SIZE 8 MM: Brand: ANCHOR TISSUE RETRIEVAL SYSTEM

## (undated) DEVICE — BLANKET WRM W29.9XL79.1IN UP BODY FORC AIR MISTRAL-AIR

## (undated) DEVICE — SET EXTN PRIMING 4.9ML L30IN INCL SLDE CLMP SPIN M LUERLOCK

## (undated) DEVICE — COVER EQUIP STEEP TREND EZ CLN UP WTRPRF DISP FOR STD SZ

## (undated) DEVICE — CANNULA SEAL

## (undated) DEVICE — SOLUTION IV IRRIG POUR BRL 0.9% SODIUM CHL 2F7124

## (undated) DEVICE — DEVICE SECUREMENT AD W/ TRICOT ANCHR PD FOR F LTX SIL CATH

## (undated) DEVICE — BLADELESS OBTURATOR: Brand: WECK VISTA

## (undated) DEVICE — INSUFFLATION NEEDLE TO ESTABLISH PNEUMOPERITONEUM.: Brand: INSUFFLATION NEEDLE

## (undated) DEVICE — CHLORAPREP 26ML ORANGE

## (undated) DEVICE — PROTECTOR EYE PT SELF ADH NS OPT GRD LF

## (undated) DEVICE — ELECTRODE PT RET AD L9FT HI MOIST COND ADH HYDRGEL CORDED

## (undated) DEVICE — ADHESIVE SKIN CLSR 0.7ML TOP DERMBND ADV

## (undated) DEVICE — INVIEW CLEAR LEGGINGS: Brand: CONVERTORS

## (undated) DEVICE — TRAY PREP DRY W/ PREM GLV 2 APPL 6 SPNG 2 UNDPD 1 OVERWRAP

## (undated) DEVICE — LIGHT HANDLE: Brand: DEVON

## (undated) DEVICE — SYRINGE, LUER LOCK, 10ML: Brand: MEDLINE

## (undated) DEVICE — ARM DRAPE

## (undated) DEVICE — 35 ML SYRINGE LUER-LOCK TIP: Brand: MONOJECT

## (undated) DEVICE — CATHETER TRAY 16 FR 5 CC FOL ANTIREFLX SAMPLING PRT DOVER

## (undated) DEVICE — STERILE LATEX POWDER-FREE SURGICAL GLOVESWITH NITRILE COATING: Brand: PROTEXIS

## (undated) DEVICE — 30977 SEE SHARP - ENHANCED INTRAOPERATIVE LAPAROSCOPE CLEANING & DEFOGGING: Brand: 30977 SEE SHARP - ENHANCED INTRAOPERATIVE LAPAROSCOPE CLEANING & DEFOGGING

## (undated) DEVICE — TROCAR: Brand: KII FIOS FIRST ENTRY

## (undated) DEVICE — TIP APPL L35CM RIG FOR SEAL EVICEL

## (undated) DEVICE — TIP COVER ACCESSORY

## (undated) DEVICE — SUTURE MCRYL SZ 4-0 L27IN ABSRB UD L19MM PS-2 1/2 CIR PRIM Y426H

## (undated) DEVICE — ROBOTIC PK